# Patient Record
Sex: FEMALE | Race: BLACK OR AFRICAN AMERICAN | NOT HISPANIC OR LATINO | Employment: FULL TIME | URBAN - METROPOLITAN AREA
[De-identification: names, ages, dates, MRNs, and addresses within clinical notes are randomized per-mention and may not be internally consistent; named-entity substitution may affect disease eponyms.]

---

## 2020-12-02 ENCOUNTER — TELEPHONE (OUTPATIENT)
Dept: NEUROLOGY | Facility: CLINIC | Age: 45
End: 2020-12-02

## 2020-12-02 DIAGNOSIS — R20.2 NUMBNESS AND TINGLING OF BOTH UPPER EXTREMITIES: Primary | ICD-10-CM

## 2020-12-02 DIAGNOSIS — R20.0 NUMBNESS AND TINGLING OF BOTH UPPER EXTREMITIES: Primary | ICD-10-CM

## 2020-12-07 ENCOUNTER — TELEPHONE (OUTPATIENT)
Dept: NEUROLOGY | Facility: CLINIC | Age: 45
End: 2020-12-07

## 2020-12-08 ENCOUNTER — PROCEDURE VISIT (OUTPATIENT)
Dept: NEUROLOGY | Facility: CLINIC | Age: 45
End: 2020-12-08
Payer: COMMERCIAL

## 2020-12-08 DIAGNOSIS — R20.2 NUMBNESS AND TINGLING OF BOTH UPPER EXTREMITIES: Primary | ICD-10-CM

## 2020-12-08 DIAGNOSIS — R20.0 NUMBNESS AND TINGLING OF BOTH UPPER EXTREMITIES: Primary | ICD-10-CM

## 2020-12-08 PROCEDURE — 95911 NRV CNDJ TEST 9-10 STUDIES: CPT | Mod: S$GLB,,, | Performed by: PSYCHIATRY & NEUROLOGY

## 2020-12-08 PROCEDURE — 95911 PR NERVE CONDUCTION STUDY; 9-10 STUDIES: ICD-10-PCS | Mod: S$GLB,,, | Performed by: PSYCHIATRY & NEUROLOGY

## 2020-12-08 NOTE — PROCEDURES
[facility]   Angel Ingram  Department of Neurology  [address] [place] [address] [place]  [id number]  Phone [phone]     Fax  [phone]        Patient: Ata Allison YOB: 1975  Patient ID: [id number] Age: 44 Years 11 Months  Sex: Female Ref. Provider: Gm Chaudhry DO  Notes: NCS/LUE/LLE/Peripheral neuropathy workup. Hx: HTN, Borderline diabetes, thrombocytosis.      SUMMARY     Nerve conduction studies were performed in the left upper and lower extremity. The left median motor study recording the abductor pollicis brevis showed a normal amplitude, normal distal latency and normal conduction velocity. The left ulnar motor study recording the abductor digiti minimi showed a normal amplitude, normal distal latency and normal conduction velocity. No conduction block or focal slowing was present across the elbow.     The left median sensory response recording digit two showed a normal amplitude, latency and conduction velocity. The left ulnar sensory response recording digit five showed a normal amplitude, latency and conduction velocity. The left radial sensory response recording over the extensor snuff box showed a normal amplitude, latency and conduction velocity.     The left peroneal motor study recording the extensor digitorum brevis showed a normal amplitude, normal distal latency and normal conduction velocity. No conduction block or focal slowing was present across the fibular neck. The left tibial motor study recording the abductor hallucis brevis showed a normal amplitude, normal distal latency and normal conduction velocity.     Left sural sensory response showed a normal amplitude and conduction velocity. Left superficial peroneal sensory response showed a normal amplitude and conduction velocity.       IMPRESSION     This is a normal study. There is no electrophysiologic evidence of larger fiber peripheral polyneuropathy.     Please note routine nerve conduction  studies assess the larger, myelinated motor and sensory fibers. Thus, routine electrodiagnostic studies may be insensitive in detecting a peripheral neuropathy restricted to small fibers alone (i.e., pain, temperature and autonomic fibers). However, most peripheral neuropathies with predominantly small fiber large dysfunction will also involve large fibers to a lesser extent and will demonstrate abnormalities on electrodiagnostic studies. Thus, clinical correlation is required in the interpretation of this negative electrodiagnostic study if an isolated small fiber neuropathy is considered.      ---------------------------------               Chely Mckeon M.D., F.A.A.N.      Diplomate, American Board of Psychiatry and Neurology  Diplomate, American Board of Clinical Neurophysiology   Fellow, American Academy of Neurology            Sensory NCS      Nerve / Sites Rec. Site Peak NP Amp PP Amp Dist Zurdo d Lat.2     ms µV µV cm m/s ms   L MEDIAN - Dig II      Wrist II 3.18 14.7 17.9 13 50.9 3.18      Ref.  3.40  20.0  48.0    L ULNAR - Dig V      Wrist Dig V 2.60 5.9 12.4 11 55.6 2.60      Ref.  3.10  12.0  48.0    L RADIAL - Snuff box      Forearm Snuff box 2.24 37.0 28.2 10 64.0 2.24      Ref.  2.70 18.0       L MEDIAN - Ulnar - Palmar      Median Wrist 2.19 16.6 7.9 8 42.7 2.19      Ulnar Wrist 1.88 12.6 11.1 8 170.7 -0.31   L SURAL - Lat Mall      Calf Lat Mall 3.54 10.1 5.9 14 52.7 3.54      Ref.  4.50  5.0  40.0    L SUP PERONEAL      Lat Leg Ankle 2.86 9.4 8.5 10 43.6 2.86      Ref.  4.50  5.0  40.0        Motor NCS      Nerve / Sites Rec. Site Lat Amp Dist Zurdo     ms mV cm m/s   L MEDIAN - APB      Wrist APB 3.28 9.4 8       Ref.  3.90 6.0        Elbow APB 6.98 9.1 24 64.9      Ref.     49.0   L ULNAR - ADM      Wrist ADM 2.66 9.6 8       Ref.  3.10 7.0        B.Elbow ADM 6.09 9.3 21.5 62.5      Ref.     50.0      A.Elbow ADM 7.24 9.1 10 87.3   L COMM PERONEAL - EDB      Ankle EDB 2.92 4.2 8       Ref.  5.50 3.0         FibHead EDB 10.10 3.3 34 47.3      Ref.     40.0      Knee EDB 11.82 3.2 11 64.0   L TIBIAL (KNEE) - AH      Ankle AH 3.75 8.2 8       Ref.  6.00 8.0        Knee AH 11.93 4.8 41 50.1      Ref.     40.0

## 2022-09-01 ENCOUNTER — OFFICE VISIT (OUTPATIENT)
Dept: OBSTETRICS AND GYNECOLOGY | Facility: CLINIC | Age: 47
End: 2022-09-01
Payer: COMMERCIAL

## 2022-09-01 VITALS
SYSTOLIC BLOOD PRESSURE: 126 MMHG | WEIGHT: 213.06 LBS | DIASTOLIC BLOOD PRESSURE: 82 MMHG | BODY MASS INDEX: 35.5 KG/M2 | HEIGHT: 65 IN

## 2022-09-01 DIAGNOSIS — Z01.419 ENCOUNTER FOR GYNECOLOGICAL EXAMINATION (GENERAL) (ROUTINE) WITHOUT ABNORMAL FINDINGS: ICD-10-CM

## 2022-09-01 DIAGNOSIS — Z12.4 SCREENING FOR CERVICAL CANCER: ICD-10-CM

## 2022-09-01 DIAGNOSIS — Z30.431 ENCOUNTER FOR ROUTINE CHECKING OF INTRAUTERINE CONTRACEPTIVE DEVICE (IUD): ICD-10-CM

## 2022-09-01 DIAGNOSIS — Z12.11 SPECIAL SCREENING FOR MALIGNANT NEOPLASMS, COLON: ICD-10-CM

## 2022-09-01 DIAGNOSIS — Z12.39 ENCOUNTER FOR SCREENING FOR MALIGNANT NEOPLASM OF BREAST, UNSPECIFIED SCREENING MODALITY: Primary | ICD-10-CM

## 2022-09-01 PROCEDURE — 3074F PR MOST RECENT SYSTOLIC BLOOD PRESSURE < 130 MM HG: ICD-10-PCS | Mod: CPTII,S$GLB,, | Performed by: OBSTETRICS & GYNECOLOGY

## 2022-09-01 PROCEDURE — 88175 CYTOPATH C/V AUTO FLUID REDO: CPT | Performed by: OBSTETRICS & GYNECOLOGY

## 2022-09-01 PROCEDURE — 1159F PR MEDICATION LIST DOCUMENTED IN MEDICAL RECORD: ICD-10-PCS | Mod: CPTII,S$GLB,, | Performed by: OBSTETRICS & GYNECOLOGY

## 2022-09-01 PROCEDURE — 87624 HPV HI-RISK TYP POOLED RSLT: CPT | Performed by: OBSTETRICS & GYNECOLOGY

## 2022-09-01 PROCEDURE — 99386 PR PREVENTIVE VISIT,NEW,40-64: ICD-10-PCS | Mod: S$GLB,,, | Performed by: OBSTETRICS & GYNECOLOGY

## 2022-09-01 PROCEDURE — 99999 PR PBB SHADOW E&M-EST. PATIENT-LVL III: CPT | Mod: PBBFAC,,, | Performed by: OBSTETRICS & GYNECOLOGY

## 2022-09-01 PROCEDURE — 1159F MED LIST DOCD IN RCRD: CPT | Mod: CPTII,S$GLB,, | Performed by: OBSTETRICS & GYNECOLOGY

## 2022-09-01 PROCEDURE — 99999 PR PBB SHADOW E&M-EST. PATIENT-LVL III: ICD-10-PCS | Mod: PBBFAC,,, | Performed by: OBSTETRICS & GYNECOLOGY

## 2022-09-01 PROCEDURE — 3008F BODY MASS INDEX DOCD: CPT | Mod: CPTII,S$GLB,, | Performed by: OBSTETRICS & GYNECOLOGY

## 2022-09-01 PROCEDURE — 3079F PR MOST RECENT DIASTOLIC BLOOD PRESSURE 80-89 MM HG: ICD-10-PCS | Mod: CPTII,S$GLB,, | Performed by: OBSTETRICS & GYNECOLOGY

## 2022-09-01 PROCEDURE — 3008F PR BODY MASS INDEX (BMI) DOCUMENTED: ICD-10-PCS | Mod: CPTII,S$GLB,, | Performed by: OBSTETRICS & GYNECOLOGY

## 2022-09-01 PROCEDURE — 3079F DIAST BP 80-89 MM HG: CPT | Mod: CPTII,S$GLB,, | Performed by: OBSTETRICS & GYNECOLOGY

## 2022-09-01 PROCEDURE — 99386 PREV VISIT NEW AGE 40-64: CPT | Mod: S$GLB,,, | Performed by: OBSTETRICS & GYNECOLOGY

## 2022-09-01 PROCEDURE — 3074F SYST BP LT 130 MM HG: CPT | Mod: CPTII,S$GLB,, | Performed by: OBSTETRICS & GYNECOLOGY

## 2022-09-01 RX ORDER — CARVEDILOL 12.5 MG/1
TABLET ORAL
COMMUNITY
Start: 2022-06-27

## 2022-09-01 RX ORDER — AMLODIPINE BESYLATE 5 MG/1
TABLET ORAL
COMMUNITY
Start: 2022-06-27 | End: 2024-02-26

## 2022-09-01 RX ORDER — FLUCONAZOLE 200 MG/1
200 TABLET ORAL DAILY
Qty: 3 TABLET | Refills: 2 | Status: SHIPPED | OUTPATIENT
Start: 2022-09-01 | End: 2022-09-04

## 2022-09-01 NOTE — PROGRESS NOTES
Subjective:       Patient ID: Estefany Berumen is a 46 y.o. female.    Chief Complaint:  Annual Exam      History of Present Illness  HPI  Annual Exam-Premenopausal  Patient presents for annual exam. The patient has no complaints today. The patient is sexually active. Condoms/mirena iud; 2 partners;--recently  GYN screening history: last pap: was normal and patient does not recall when last pap was and last mammogram: approximate date 2021 and was normal. The patient wears seatbelts: yes. The patient participates in regular exercise: no. Has the patient ever been transfused or tattooed?: no. The patient reports that there is not domestic violence in her life.    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   Date of Last Pap: No result found    OB History    Para Term  AB Living   6 1 1   5 1   SAB IAB Ectopic Multiple Live Births   5       1      # Outcome Date GA Lbr Dev/2nd Weight Sex Delivery Anes PTL Lv   6 SAB            5 SAB            4 SAB            3 SAB            2 SAB            1 Term      Vag-Spont   GERMAN       Review of Systems  Review of Systems   Psychiatric/Behavioral:  Positive for sleep disturbance.    All other systems reviewed and are negative.        Objective:      Physical Exam:   Constitutional: She is oriented to person, place, and time. She appears well-developed and well-nourished.     Eyes: Pupils are equal, round, and reactive to light. Conjunctivae and EOM are normal.      Pulmonary/Chest: Effort normal. Right breast exhibits no mass, no nipple discharge, no skin change and no tenderness. Left breast exhibits no mass, no nipple discharge, no skin change and no tenderness. Breasts are symmetrical.        Abdominal: Soft.      Genitourinary:    Vagina, uterus, right adnexa, left adnexa and rectum normal.      Pelvic exam was performed with patient supine.   The external female genitalia was normal.   Labial bartholins normal.Cervix is normal. Right adnexum displays no mass and no tenderness. Left adnexum displays no mass and no tenderness. No erythema,  no vaginal discharge, bleeding, rectocele, cystocele or unspecified prolapse of vaginal walls in the vagina. Vagina was moist.   pap smear completedUerus contour normal  Normal urethral meatus.Urethra findings: no urethral massBladder findings: no bladder distention and no bladder tendernessUnable to visualize IUD strings.          Musculoskeletal: Normal range of motion and moves all extremeties.       Neurological: She is alert and oriented to person, place, and time.    Skin: Skin is warm.    Psychiatric: She has a normal mood and affect. Her behavior is normal.         Assessment:        1. Encounter for screening for malignant neoplasm of breast, unspecified screening modality    2. Encounter for gynecological examination (general) (routine) without abnormal findings    3. Encounter for routine checking of intrauterine contraceptive device (IUD)    4. Special screening for malignant neoplasms, colon               Plan:      Continue annual well woman exam.  Pap today; Reviewed updated recommendations for pap smears (every 3 years) in low risk patients.   Recommend annual pelvic exams.  Reviewed recommendations for annual CBE.  Mirena due for replacement 5/2025  Accepts colonoscopy for colon cancer screen  Continue menstrual calendar  Continue diet, exercise, weight loss    Requesting diflucan, rx sent

## 2022-10-07 ENCOUNTER — HOSPITAL ENCOUNTER (OUTPATIENT)
Dept: RADIOLOGY | Facility: HOSPITAL | Age: 47
Discharge: HOME | End: 2022-10-07
Attending: OBSTETRICS & GYNECOLOGY
Payer: COMMERCIAL

## 2022-10-07 DIAGNOSIS — Z12.39 ENCOUNTER FOR SCREENING FOR MALIGNANT NEOPLASM OF BREAST, UNSPECIFIED SCREENING MODALITY: ICD-10-CM

## 2022-10-07 PROCEDURE — 77067 SCR MAMMO BI INCL CAD: CPT | Mod: TC

## 2022-10-07 PROCEDURE — 77063 BREAST TOMOSYNTHESIS BI: CPT | Mod: TC

## 2022-10-07 PROCEDURE — 77067 SCR MAMMO BI INCL CAD: CPT | Mod: 26,,, | Performed by: RADIOLOGY

## 2022-10-07 PROCEDURE — 77063 MAMMO DIGITAL SCREENING BILAT WITH TOMO: ICD-10-PCS | Mod: 26,,, | Performed by: RADIOLOGY

## 2022-10-07 PROCEDURE — 77063 BREAST TOMOSYNTHESIS BI: CPT | Mod: 26,,, | Performed by: RADIOLOGY

## 2022-10-07 PROCEDURE — 77067 MAMMO DIGITAL SCREENING BILAT WITH TOMO: ICD-10-PCS | Mod: 26,,, | Performed by: RADIOLOGY

## 2022-10-11 ENCOUNTER — TELEPHONE (OUTPATIENT)
Dept: OBSTETRICS AND GYNECOLOGY | Facility: CLINIC | Age: 47
End: 2022-10-11
Payer: COMMERCIAL

## 2022-10-11 NOTE — TELEPHONE ENCOUNTER
----- Message from Tigist Mejia MD sent at 10/11/2022  2:58 PM CDT -----  Please send negative mammo letter

## 2022-10-11 NOTE — TELEPHONE ENCOUNTER
Attempted to contact patient, no answer. Left patient voice mail to return call to clinic.    Regarding test results.

## 2022-10-11 NOTE — TELEPHONE ENCOUNTER
Pt returned phone call to clinic. Spoke with patient. Two pt identifiers confirmed. Notified patient of results. Patients verbalized understanding.

## 2023-08-03 DIAGNOSIS — Z12.39 BREAST CANCER SCREENING OTHER THAN MAMMOGRAM: Primary | ICD-10-CM

## 2023-09-21 ENCOUNTER — OFFICE VISIT (OUTPATIENT)
Dept: OBSTETRICS AND GYNECOLOGY | Facility: CLINIC | Age: 48
End: 2023-09-21
Payer: COMMERCIAL

## 2023-09-21 VITALS
WEIGHT: 212.31 LBS | BODY MASS INDEX: 35.37 KG/M2 | SYSTOLIC BLOOD PRESSURE: 112 MMHG | HEIGHT: 65 IN | DIASTOLIC BLOOD PRESSURE: 70 MMHG

## 2023-09-21 DIAGNOSIS — N39.46 MIXED STRESS AND URGE URINARY INCONTINENCE: ICD-10-CM

## 2023-09-21 DIAGNOSIS — R35.0 INCREASED URINARY FREQUENCY: ICD-10-CM

## 2023-09-21 DIAGNOSIS — Z01.419 ENCOUNTER FOR GYNECOLOGICAL EXAMINATION (GENERAL) (ROUTINE) WITHOUT ABNORMAL FINDINGS: Primary | ICD-10-CM

## 2023-09-21 DIAGNOSIS — N32.81 URGENCY-FREQUENCY SYNDROME: ICD-10-CM

## 2023-09-21 DIAGNOSIS — Z12.31 SCREENING MAMMOGRAM, ENCOUNTER FOR: ICD-10-CM

## 2023-09-21 DIAGNOSIS — Z12.4 SCREENING FOR CERVICAL CANCER: ICD-10-CM

## 2023-09-21 PROCEDURE — 3008F PR BODY MASS INDEX (BMI) DOCUMENTED: ICD-10-PCS | Mod: CPTII,S$GLB,, | Performed by: OBSTETRICS & GYNECOLOGY

## 2023-09-21 PROCEDURE — 1159F PR MEDICATION LIST DOCUMENTED IN MEDICAL RECORD: ICD-10-PCS | Mod: CPTII,S$GLB,, | Performed by: OBSTETRICS & GYNECOLOGY

## 2023-09-21 PROCEDURE — 3008F BODY MASS INDEX DOCD: CPT | Mod: CPTII,S$GLB,, | Performed by: OBSTETRICS & GYNECOLOGY

## 2023-09-21 PROCEDURE — 99396 PREV VISIT EST AGE 40-64: CPT | Mod: S$GLB,,, | Performed by: OBSTETRICS & GYNECOLOGY

## 2023-09-21 PROCEDURE — 3074F SYST BP LT 130 MM HG: CPT | Mod: CPTII,S$GLB,, | Performed by: OBSTETRICS & GYNECOLOGY

## 2023-09-21 PROCEDURE — 99396 PR PREVENTIVE VISIT,EST,40-64: ICD-10-PCS | Mod: S$GLB,,, | Performed by: OBSTETRICS & GYNECOLOGY

## 2023-09-21 PROCEDURE — 99999 PR PBB SHADOW E&M-EST. PATIENT-LVL III: ICD-10-PCS | Mod: PBBFAC,,, | Performed by: OBSTETRICS & GYNECOLOGY

## 2023-09-21 PROCEDURE — 87086 URINE CULTURE/COLONY COUNT: CPT | Performed by: OBSTETRICS & GYNECOLOGY

## 2023-09-21 PROCEDURE — 3078F PR MOST RECENT DIASTOLIC BLOOD PRESSURE < 80 MM HG: ICD-10-PCS | Mod: CPTII,S$GLB,, | Performed by: OBSTETRICS & GYNECOLOGY

## 2023-09-21 PROCEDURE — 3074F PR MOST RECENT SYSTOLIC BLOOD PRESSURE < 130 MM HG: ICD-10-PCS | Mod: CPTII,S$GLB,, | Performed by: OBSTETRICS & GYNECOLOGY

## 2023-09-21 PROCEDURE — 1159F MED LIST DOCD IN RCRD: CPT | Mod: CPTII,S$GLB,, | Performed by: OBSTETRICS & GYNECOLOGY

## 2023-09-21 PROCEDURE — 99999 PR PBB SHADOW E&M-EST. PATIENT-LVL III: CPT | Mod: PBBFAC,,, | Performed by: OBSTETRICS & GYNECOLOGY

## 2023-09-21 PROCEDURE — 3078F DIAST BP <80 MM HG: CPT | Mod: CPTII,S$GLB,, | Performed by: OBSTETRICS & GYNECOLOGY

## 2023-09-21 RX ORDER — MIRABEGRON 25 MG/1
25 TABLET, FILM COATED, EXTENDED RELEASE ORAL DAILY
Qty: 90 TABLET | Refills: 3 | Status: SHIPPED | OUTPATIENT
Start: 2023-09-21 | End: 2023-11-10 | Stop reason: ALTCHOICE

## 2023-09-21 NOTE — PROGRESS NOTES
Subjective:       Patient ID: Estefany Berumen is a 47 y.o. female.    Chief Complaint:  Well Woman      History of Present Illness  HPI  Annual Exam-Premenopausal  Patient presents for annual exam. The patient reports increased pressure--like something falling out; also c/o urinary incontinence for past years--occas use of oxybutynin---urgency/frequency/ sometimes urine just comes--when she is sitting;  can leak with a strong urge; also leaks with cough/sneeze; feels she does not empty bladder completely;  The patient is sexually active. --deneis pelvic pain, iud for contraception; GYN screening history: last pap: approximate date 2022 and was normal and last mammogram: approximate date  and was normal. The patient wears seatbelts: yes. The patient participates in regular exercise: no. Has the patient ever been transfused or tattooed?: no. The patient reports that there is not domestic violence in her life.  Problems falling/staying asleep    Rare bleeding; has iud in placed ( 2020);   GYN & OB History  No LMP recorded. (Menstrual status: Birth Control).   Date of Last Pap: No result found    OB History    Para Term  AB Living   6 1 1   5 1   SAB IAB Ectopic Multiple Live Births   5       1      # Outcome Date GA Lbr Dev/2nd Weight Sex Delivery Anes PTL Lv   6 SAB            5 SAB            4 SAB            3 SAB            2 SAB            1 Term      Vag-Spont   GERMAN       Review of Systems  Review of Systems   Genitourinary:  Positive for frequency, urgency and urinary incontinence.   All other systems reviewed and are negative.          Objective:      Physical Exam:   Constitutional: She is oriented to person, place, and time. She appears well-developed and well-nourished.     Eyes: Pupils are equal, round, and reactive to light. Conjunctivae and EOM are normal.      Pulmonary/Chest: Effort normal. Right breast exhibits no mass, no nipple discharge, no skin change and no tenderness. Left  breast exhibits no mass, no nipple discharge, no skin change and no tenderness. Breasts are symmetrical.        Abdominal: Soft.     Genitourinary:    Vagina, uterus, right adnexa, left adnexa and rectum normal.      Pelvic exam was performed with patient supine.   The external female genitalia was normal.   Labial bartholins normal.Cervix is normal. Right adnexum displays no mass and no tenderness. Left adnexum displays no mass and no tenderness. There is rectocele in the vagina. No erythema,  no vaginal discharge, bleeding, cystocele or unspecified prolapse of vaginal walls in the vagina. Vagina was moist.   pap smear not completedUerus contour normal  Normal urethral meatus.Urethra findings: no urethral massBladder findings: no bladder distention and no bladder tenderness          Musculoskeletal: Normal range of motion and moves all extremeties.       Neurological: She is alert and oriented to person, place, and time.    Skin: Skin is warm.    Psychiatric: She has a normal mood and affect. Her behavior is normal.           Assessment:     Encounter Diagnoses   Name Primary?    Increased urinary frequency     Urgency-frequency syndrome     Mixed stress and urge urinary incontinence     Encounter for gynecological examination (general) (routine) without abnormal findings Yes    Screening for cervical cancer     Screening mammogram, encounter for          Plan:      Continue annual well woman exam.  Pap 2022 due in 2025; Reviewed updated recommendations for pap smears (every 3 years) in low risk patients.   Recommend annual pelvic exams.  Reviewed recommendations for annual CBE.  Daily stool softner  Rx sent for myrbetriq  Ucx today  Iud due to be removed in 2025  Has colonoscopy scheduled  mammo ordered, continue yearly until age 74   Encouraged diet, exercise, weight loss

## 2023-09-23 LAB — BACTERIA UR CULT: NO GROWTH

## 2023-09-25 ENCOUNTER — TELEPHONE (OUTPATIENT)
Dept: OBSTETRICS AND GYNECOLOGY | Facility: CLINIC | Age: 48
End: 2023-09-25
Payer: COMMERCIAL

## 2023-09-25 NOTE — TELEPHONE ENCOUNTER
----- Message from Marlene aGrcia sent at 9/25/2023  3:04 PM CDT -----  Regarding: HAD QUESTION ABOUT HER LAST VISIT  Mrs nathan lee was seen by dr dougherty on 9-21-23 , she had some question to ask about her visit , she can be re back at [phone]

## 2023-09-26 ENCOUNTER — TELEPHONE (OUTPATIENT)
Dept: OBSTETRICS AND GYNECOLOGY | Facility: CLINIC | Age: 48
End: 2023-09-26
Payer: COMMERCIAL

## 2023-09-26 DIAGNOSIS — N32.81 URGENCY-FREQUENCY SYNDROME: Primary | ICD-10-CM

## 2023-09-26 DIAGNOSIS — B37.31 YEAST VAGINITIS: ICD-10-CM

## 2023-09-26 DIAGNOSIS — N39.46 MIXED STRESS AND URGE URINARY INCONTINENCE: ICD-10-CM

## 2023-09-26 RX ORDER — FLUCONAZOLE 200 MG/1
200 TABLET ORAL DAILY
Qty: 3 TABLET | Refills: 0 | Status: SHIPPED | OUTPATIENT
Start: 2023-09-26 | End: 2023-09-29

## 2023-09-26 NOTE — TELEPHONE ENCOUNTER
----- Message from Mamie Funez sent at 9/25/2023  4:11 PM CDT -----  Contact: Murali [phone]--  Patient is returning a phone call.    Who left a message for the patient: --Nurse--    Does patient know what this is regarding:  --questions--    Would you like a call back, or a response through your [facility] portal?:  --Call back--    Comments:  Please call to advise.

## 2023-09-26 NOTE — TELEPHONE ENCOUNTER
Called patient and she stated Dr. Mejia mentioned her seeing urology and doing pelvic floor PT.  Patient would like referrals for both of those.  Patient has urologist, but may prefer [facility].    Patient also requesting Diflucan (patient stated she has yeast).      Advised patient message would be sent to Dr. Mejia and she verbalized understanding.

## 2023-10-16 NOTE — PROGRESS NOTES
[facility]   Pelvic Health Physical Therapy Initial Evaluation     Date: 10/17/2023   Name: Estefany Berumen  Clinic Number: [id number]    Therapy Diagnosis:   Encounter Diagnoses   Name Primary?    Urgency-frequency syndrome     Mixed stress and urge urinary incontinence     Pelvic floor dysfunction      Physician: Tigist Mejia MD    Physician Orders: PT Eval and Treat   Medical Diagnosis from Referral: Urgency-frequency syndrome [N32.81], Mixed stress and urge urinary incontinence [N39.46]  Evaluation Date: 10/17/2023  Authorization Period Expiration: 2024  Plan of Care Expiration: 2023  Progress Note Due: 2023  Visit # / Visits authorized:  eval  FOTO: Issued Visit #: 1 /3    Precautions: Standard    Time In: 9:20  Time Out: 10:35  Total Appointment Time (timed & untimed codes): 75 minutes    SUBJECTIVE     Date of onset: months     History of current condition - Estefany reports: having rectocele that has been noticable for months. She notices it when she is walking for an extended time and when going to the bathroom. She will push on vaginal opening at times to decrease pressure/heaviness feeling. Patient stating MD would like to perform surgery instead of pessary use.     OB/GYN History: , vaginal delivery, episiotomy, rectocele prolapse, painful vaginal penetration, and chronic yeast infections   Sexually active? No  Pain with vaginal exams, intercourse or tampon use? Yes    Bladder/Bowel History: trouble initiating urine stream, slow stream, trouble emptying bladder completely, recurrent bladder infections, urinary incontinence, and constipation/straining for movement  Frequency of urination:   Daytime: every 2-3 hours. Reports having overactive bladder (mybetriq)           Nighttime: 3  Difficulty initiating urine stream: Yes sometimes; has gotten better since   Urine stream: weak and strong  Complete emptying: No  Bladder leakage: Yes  Frequency of  incidents: 3-4 times a day (doesn't matter the activity) has been going on for awhile  Amount leaked (urine): small squirt   Urinary Urgency: Yes, Able to delay the urge for at least 15 minute(s).  Frequency of bowel movements: every 3-5 days   Difficulty initiating BM: Yes  Quality/Shape of BM: [place] Stool Chart Type 1-3   Does Patient Feel Empty after BM? Yes  Fiber Supplements or Laxative Use? Yes (suppositories and new medication)   Colon leakage: No  Frequency of incidents: none   Amount leaked (bowels):  none  Form of protection: panty liner  Number of pads required in 24 hours: 3-4     Pain:  Location: vaginal exam  worst 6/10  Description: discomfort    Imaging: see chart    Prior Therapy: in the past   Social History:  lives alone  Current exercise: stopped due to heaviness.   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     Surgical History: Estefany Berumen  has a past surgical history that includes Reduction of both breasts (Bilateral, 2015).    Medications: Estefany has a current medication list which includes the following prescription(s): amlodipine, carvedilol, fesoterodine, myrbetriq, and olmesartan-hydrochlorothiazide.    Allergies:   Review of patient's allergies indicates:   Allergen Reactions    Sulfa (sulfonamide antibiotics) Other (See Comments)    Sulfamethoxazole-trimethoprim     Trimethoprim Other (See Comments)        OBJECTIVE     See EMR under MEDIA for written consent provided 10/17/2023    ORTHO SCREEN  Posture in sitting: slouched   Pelvic alignment: no sign of deviations noted in supine     ABDOMINAL WALL  ASSESSMENT  Palpation: hypertonic  Abdominal strength: Transverse abdominus: fair with exhale cueing   Scarring: none noted   Diastasis: absent      BREATHING MECHANICS ASSESSMENT   Thorax Assessment During Quiet Respiration: Decreased excursion of abdominal wall  and Decreased excursion bilaterally of lateral ribs   Thorax Assessment During Deep Respiration: Decreased excursion of abdominal wall  and Excessive excursion of lower ribs. Slight improvement with cueing    VAGINAL PELVIC FLOOR EXAM    EXTERNAL ASSESSMENT  Introitus: gaping  Skin condition: WNL  Scarring: episiotomy scar noted   Sensation: WNL   Pain: none  Voluntary contraction: visible lift  Voluntary relaxation: nil  Involuntary contraction: reflex tightening  Bearing down: bulge      INTERNAL ASSESSMENT  Pain: tender areas noted as follows: bilateral obturators, bilateral superficial transverse perineal    Sensation: able to localized pressure appropriately  Vaginal vault: stenotic   Muscle Bulk: hypertonus   Muscle Power: 3+/5  Muscle Endurance: 2 sec    Quality of contraction: incomplete relaxation   Coordination: tends to hold breath during PFM contration   Prolapse check: anterior and posterior vaginal wall weakness  Comments: increased tension in deep pelvic floor muscles with diaphragmatic inhale    Limitation/Restriction for FOTO Pelvic Floor Survey    Therapist reviewed FOTO scores for Estefany Berumen on 10/17/2023.   FOTO documents entered into [employer] - see Media section.    Limitation Score: PFDI Prolapse 21     TREATMENT     Total Treatment time (time-based codes) separate from Evaluation: 33 minutes       Manual Therapy to improve flexibility and extensibility for 08 minutes including:     Internal trigger point release to bilateral obturators internus and bilateral superficial transverse perineal- patient [place]wan pelvic floor muscles and increasing tension with diaphragmatic breathing    Therapeutic Activity to improve dynamic  "functional performance for 25 minutes including:    Reviewed HEP   Diaphragmatic breathing with tactile and verbal cues  TA contractions with deep breathing and "shhh" on exhale  Cat cows x10 - cues for deep breathing techniques  Education on double voiding techniques. Handout given and demonstrated.  Education on urge control. Handout given.       PATIENT EDUCATION AND HOME EXERCISES     Education provided:   general anatomy/physiology of urinary/ bowel  system, benefits of treatment, risks of treatment, and alternative methods of treatment were discussed with the patient. Additionally, anatomy/physiology of pelvic floor, bladder retraining, diaphragmatic breathing, double voiding techniques, isometric abdominal exercises, kegels, proper bearing down techniques, and fluid intake/dietary modifications were reviewed.     Written Home Exercises provided: yes.  Exercises were reviewed and Estefany was able to demonstrate them prior to the end of the session. Estefany demonstrated good  understanding of the education provided. See EMR under Patient Instructions for exercises provided during therapy sessions.    ASSESSMENT     Estefany is a 47 y.o. female referred to outpatient Physical Therapy with a medical diagnosis of Urgency-frequency syndrome and Mixed stress and urge urinary incontinence. Pt presents with altered posture, poor knowledge of body mechanics and posture, poor trunk stability, pelvic floor tenderness, decreased pelvic muscle strength, decreased endurance of the pelvic muscles, increased tension of the pelvic muscles, poor quality of pelvic muscle contraction, increased nocturia, poor fluid intake, incomplete urination, and dysfunctional voiding.        Patient prognosis is Good.   Patient will benefit from skilled outpatient Physical Therapy to address the deficits stated above and in the chart below, provide patient/family education, and to maximize patient's level of independence.     Plan of care " discussed with patient: Yes  Patient's spiritual, cultural and educational needs considered and patient is agreeable to the plan of care and goals as stated below:     Anticipated Barriers for therapy: none    Medical Necessity is demonstrated by the following:    History  Co-morbidities and personal factors that may impact the plan of care [x] LOW: no personal factors / co-morbidities  [] MODERATE: 1-2 personal factors / co-morbidities  [] HIGH: 3+ personal factors / co-morbidities    Personal Factors:   no deficits     Examination  Body Structures and Functions, activity limitations and participation restrictions that may impact the plan of care [] LOW: addressing 1-2 elements  [x] MODERATE: 3+ elements  [] HIGH: 4+ elements (please support below)    Moderate / High Support Documentation: altered posture, poor knowledge of body mechanics and posture, poor trunk stability, pelvic floor tenderness, decreased pelvic muscle strength, decreased endurance of the pelvic muscles, increased tension of the pelvic muscles, poor quality of pelvic muscle contraction, increased nocturia, poor fluid intake, incomplete urination, and dysfunctional voiding     Clinical Presentation [x] LOW: stable  [] MODERATE: Evolving  [] HIGH: Unstable     Decision Making/ Complexity Score: low        Goals:  Short Term Goals: 5 weeks   - Pt will demonstrate excellent knowledge and adherence to HEP to facilitate optimal recovery.  - Pt will demonstrate proper PFM contraction, relaxation, and lengthening coordinated with TA and breath for improved muscle coordination needed for functional activity.  - Pt to show proper diaphragmatic breathing to promote relaxation and pelvic floor functional mobility and to help with calming the nervous system in order to decrease pain and improve QOL.  - Pt will report improved ability to initiate urine stream and completely empty without straining to demonstrate improved PF relaxation for proper voiding.  - Pt  to report a decrease in pelvic pressure and fullness to no more than 50% of the time to demonstrate improving pelvic support of pelvic structures.  - Pt will report no incidence of urinary incontinence 4/7 days for improved hygiene and ADL/IADL tolerance.     Long Term Goals: 10 weeks   - Pt will demonstrate excellent knowledge and adherence to HEP for continued self-maintenance of symptoms.  - Pt will report FOTO score on PFDI Prolapse of 10 limited or less indicating clinically relevant increase in function.  - Pt to demonstrate proper pressure management with all functional mobility including blowing out with exertion and activating pelvic floor + transverse abdominus to demonstrate improving pelvic organ support for improved ADL participation.  - Pt to report a decrease in pelvic pressure and fullness to no more than 25% of the time to demonstrate improving pelvic support of pelvic structures.  - Pt will report ability to delay urinary urge for at least 25 minutes to maintain continence with ADL/IADLs.   - Pt will report no incidence of urinary incontinence 6/7 days for improved hygiene and ADL/IADL tolerance.   - Pt will report needing </= 2 pad/day indicating improved PFM function needed to maintain continence  - Pt will demonstrate PFM strength of at least 3/5 MMT for improved strength needed to maintain continence.   - Pt will report bearing down appropriately 100% of the time for improved bowel function and decreased stress on adjacent pelvic structures.   - Pt will report ability to tolerate speculum exam without pain for improved access to healthcare.    PLAN     Plan of care Certification: 10/17/2023 to 12/26/2023.    Outpatient Physical Therapy 1-2 time(s) every 1 week(s) for 10 weeks to include the following interventions: Electrical Stimulation TENS/IFC, Manual Therapy, Moist Heat/ Ice, Neuromuscular Re-ed, Patient Education, Self Care, Therapeutic Activities, Therapeutic Exercise, and FDN and ASTYM .      Cata Dominique PT      I CERTIFY THE NEED FOR THESE SERVICES FURNISHED UNDER THIS PLAN OF TREATMENT AND WHILE UNDER MY CARE   Physician's comments:     Physician's Signature: ___________________________________________________

## 2023-10-17 ENCOUNTER — CLINICAL SUPPORT (OUTPATIENT)
Dept: REHABILITATION | Facility: HOSPITAL | Age: 48
End: 2023-10-17
Attending: OBSTETRICS & GYNECOLOGY
Payer: COMMERCIAL

## 2023-10-17 DIAGNOSIS — N39.46 MIXED STRESS AND URGE URINARY INCONTINENCE: ICD-10-CM

## 2023-10-17 DIAGNOSIS — M62.89 PELVIC FLOOR DYSFUNCTION: ICD-10-CM

## 2023-10-17 DIAGNOSIS — N32.81 URGENCY-FREQUENCY SYNDROME: ICD-10-CM

## 2023-10-17 PROCEDURE — 97530 THERAPEUTIC ACTIVITIES: CPT | Mod: PN

## 2023-10-17 PROCEDURE — 97161 PT EVAL LOW COMPLEX 20 MIN: CPT | Mod: PN

## 2023-10-17 PROCEDURE — 97140 MANUAL THERAPY 1/> REGIONS: CPT | Mod: PN

## 2023-10-17 NOTE — PLAN OF CARE
[facility]   Pelvic Health Physical Therapy Initial Evaluation     Date: 10/17/2023   Name: Estefany Berumen  Clinic Number: [id number]    Therapy Diagnosis:   Encounter Diagnoses   Name Primary?    Urgency-frequency syndrome     Mixed stress and urge urinary incontinence     Pelvic floor dysfunction      Physician: Tigist Mejia MD    Physician Orders: PT Eval and Treat   Medical Diagnosis from Referral: Urgency-frequency syndrome [N32.81], Mixed stress and urge urinary incontinence [N39.46]  Evaluation Date: 10/17/2023  Authorization Period Expiration: 2024  Plan of Care Expiration: 2023  Progress Note Due: 2023  Visit # / Visits authorized:  eval  FOTO: Issued Visit #: 1 /3    Precautions: Standard    Time In: 9:20  Time Out: 10:35  Total Appointment Time (timed & untimed codes): 75 minutes    SUBJECTIVE     Date of onset: months     History of current condition - Estefany reports: having rectocele that has been noticable for months. She notices it when she is walking for an extended time and when going to the bathroom. She will push on vaginal opening at times to decrease pressure/heaviness feeling. Patient stating MD would like to perform surgery instead of pessary use.     OB/GYN History: , vaginal delivery, episiotomy, rectocele prolapse, painful vaginal penetration, and chronic yeast infections   Sexually active? No  Pain with vaginal exams, intercourse or tampon use? Yes    Bladder/Bowel History: trouble initiating urine stream, slow stream, trouble emptying bladder completely, recurrent bladder infections, urinary incontinence, and constipation/straining for movement  Frequency of urination:   Daytime: every 2-3 hours. Reports having overactive bladder (mybetriq)           Nighttime: 3  Difficulty initiating urine stream: Yes sometimes; has gotten better since   Urine stream: weak and strong  Complete emptying: No  Bladder leakage: Yes  Frequency of  incidents: 3-4 times a day (doesn't matter the activity) has been going on for awhile  Amount leaked (urine): small squirt   Urinary Urgency: Yes, Able to delay the urge for at least 15 minute(s).  Frequency of bowel movements: every 3-5 days   Difficulty initiating BM: Yes  Quality/Shape of BM: [place] Stool Chart Type 1-3   Does Patient Feel Empty after BM? Yes  Fiber Supplements or Laxative Use? Yes (suppositories and new medication)   Colon leakage: No  Frequency of incidents: none   Amount leaked (bowels): none  Form of protection: panty liner  Number of pads required in 24 hours: 3-4     Pain:  Location: vaginal exam  worst 6/10  Description: discomfort    Imaging: see chart    Prior Therapy: in the past   Social History:  lives alone  Current exercise: stopped due to heaviness.   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     Surgical History: Estefany Berumen  has a past surgical history that includes Reduction of both breasts (Bilateral, 2015).    Medications: Estefany has a current medication list which includes the following prescription(s): amlodipine, carvedilol, fesoterodine, myrbetriq, and olmesartan-hydrochlorothiazide.    Allergies:   Review of patient's allergies indicates:   Allergen Reactions    Sulfa (sulfonamide antibiotics) Other (See Comments)    Sulfamethoxazole-trimethoprim     Trimethoprim Other (See Comments)        OBJECTIVE     See EMR under MEDIA for written consent provided 10/17/2023    ORTHO SCREEN  Posture in sitting: slouched   Pelvic alignment: no sign of deviations noted in supine     ABDOMINAL WALL  ASSESSMENT  Palpation: hypertonic  Abdominal strength: Transverse abdominus: fair with exhale cueing   Scarring: none noted   Diastasis: absent      BREATHING MECHANICS ASSESSMENT   Thorax Assessment During Quiet Respiration: Decreased excursion of abdominal wall  and Decreased excursion bilaterally of lateral ribs   Thorax Assessment During Deep Respiration: Decreased excursion of abdominal wall  and Excessive excursion of lower ribs. Slight improvement with cueing    VAGINAL PELVIC FLOOR EXAM    EXTERNAL ASSESSMENT  Introitus: gaping  Skin condition: WNL  Scarring: episiotomy scar noted   Sensation: WNL   Pain: none  Voluntary contraction: visible lift  Voluntary relaxation: nil  Involuntary contraction: reflex tightening  Bearing down: bulge      INTERNAL ASSESSMENT  Pain: tender areas noted as follows: bilateral obturators, bilateral superficial transverse perineal    Sensation: able to localized pressure appropriately  Vaginal vault: stenotic   Muscle Bulk: hypertonus   Muscle Power: 3+/5  Muscle Endurance: 2 sec    Quality of contraction: incomplete relaxation   Coordination: tends to hold breath during PFM contration   Prolapse check: anterior and posterior vaginal wall weakness  Comments: increased tension in deep pelvic floor muscles with diaphragmatic inhale    Limitation/Restriction for FOTO Pelvic Floor Survey    Therapist reviewed FOTO scores for Estefany Berumen on 10/17/2023.   FOTO documents entered into [employer] - see Media section.    Limitation Score: PFDI Prolapse 21     TREATMENT     Total Treatment time (time-based codes) separate from Evaluation: 33 minutes       Manual Therapy to improve flexibility and extensibility for 08 minutes including:     Internal trigger point release to bilateral obturators internus and bilateral superficial transverse perineal- patient [place] pelvic floor muscles and increasing tension with diaphragmatic breathing    Therapeutic Activity to improve dynamic  "functional performance for 25 minutes including:    Reviewed HEP   Diaphragmatic breathing with tactile and verbal cues  TA contractions with deep breathing and "shhh" on exhale  Cat cows x10 - cues for deep breathing techniques  Education on double voiding techniques. Handout given and demonstrated.  Education on urge control. Handout given.       PATIENT EDUCATION AND HOME EXERCISES     Education provided:   general anatomy/physiology of urinary/ bowel  system, benefits of treatment, risks of treatment, and alternative methods of treatment were discussed with the patient. Additionally, anatomy/physiology of pelvic floor, bladder retraining, diaphragmatic breathing, double voiding techniques, isometric abdominal exercises, kegels, proper bearing down techniques, and fluid intake/dietary modifications were reviewed.     Written Home Exercises provided: yes.  Exercises were reviewed and Estefany was able to demonstrate them prior to the end of the session. Estefany demonstrated good  understanding of the education provided. See EMR under Patient Instructions for exercises provided during therapy sessions.    ASSESSMENT     Estefany is a 47 y.o. female referred to outpatient Physical Therapy with a medical diagnosis of Urgency-frequency syndrome and Mixed stress and urge urinary incontinence. Pt presents with altered posture, poor knowledge of body mechanics and posture, poor trunk stability, pelvic floor tenderness, decreased pelvic muscle strength, decreased endurance of the pelvic muscles, increased tension of the pelvic muscles, poor quality of pelvic muscle contraction, increased nocturia, poor fluid intake, incomplete urination, and dysfunctional voiding.        Patient prognosis is Good.   Patient will benefit from skilled outpatient Physical Therapy to address the deficits stated above and in the chart below, provide patient/family education, and to maximize patient's level of independence.     Plan of care " discussed with patient: Yes  Patient's spiritual, cultural and educational needs considered and patient is agreeable to the plan of care and goals as stated below:     Anticipated Barriers for therapy: none    Medical Necessity is demonstrated by the following:    History  Co-morbidities and personal factors that may impact the plan of care [x] LOW: no personal factors / co-morbidities  [] MODERATE: 1-2 personal factors / co-morbidities  [] HIGH: 3+ personal factors / co-morbidities    Personal Factors:   no deficits     Examination  Body Structures and Functions, activity limitations and participation restrictions that may impact the plan of care [] LOW: addressing 1-2 elements  [x] MODERATE: 3+ elements  [] HIGH: 4+ elements (please support below)    Moderate / High Support Documentation: altered posture, poor knowledge of body mechanics and posture, poor trunk stability, pelvic floor tenderness, decreased pelvic muscle strength, decreased endurance of the pelvic muscles, increased tension of the pelvic muscles, poor quality of pelvic muscle contraction, increased nocturia, poor fluid intake, incomplete urination, and dysfunctional voiding     Clinical Presentation [x] LOW: stable  [] MODERATE: Evolving  [] HIGH: Unstable     Decision Making/ Complexity Score: low        Goals:  Short Term Goals: 5 weeks   - Pt will demonstrate excellent knowledge and adherence to HEP to facilitate optimal recovery.  - Pt will demonstrate proper PFM contraction, relaxation, and lengthening coordinated with TA and breath for improved muscle coordination needed for functional activity.  - Pt to show proper diaphragmatic breathing to promote relaxation and pelvic floor functional mobility and to help with calming the nervous system in order to decrease pain and improve QOL.  - Pt will report improved ability to initiate urine stream and completely empty without straining to demonstrate improved PF relaxation for proper voiding.  - Pt  to report a decrease in pelvic pressure and fullness to no more than 50% of the time to demonstrate improving pelvic support of pelvic structures.  - Pt will report no incidence of urinary incontinence 4/7 days for improved hygiene and ADL/IADL tolerance.     Long Term Goals: 10 weeks   - Pt will demonstrate excellent knowledge and adherence to HEP for continued self-maintenance of symptoms.  - Pt will report FOTO score on PFDI Prolapse of 10 limited or less indicating clinically relevant increase in function.  - Pt to demonstrate proper pressure management with all functional mobility including blowing out with exertion and activating pelvic floor + transverse abdominus to demonstrate improving pelvic organ support for improved ADL participation.  - Pt to report a decrease in pelvic pressure and fullness to no more than 25% of the time to demonstrate improving pelvic support of pelvic structures.  - Pt will report ability to delay urinary urge for at least 25 minutes to maintain continence with ADL/IADLs.   - Pt will report no incidence of urinary incontinence 6/7 days for improved hygiene and ADL/IADL tolerance.   - Pt will report needing </= 2 pad/day indicating improved PFM function needed to maintain continence  - Pt will demonstrate PFM strength of at least 3/5 MMT for improved strength needed to maintain continence.   - Pt will report bearing down appropriately 100% of the time for improved bowel function and decreased stress on adjacent pelvic structures.   - Pt will report ability to tolerate speculum exam without pain for improved access to healthcare.    PLAN     Plan of care Certification: 10/17/2023 to 12/26/2023.    Outpatient Physical Therapy 1-2 time(s) every 1 week(s) for 10 weeks to include the following interventions: Electrical Stimulation TENS/IFC, Manual Therapy, Moist Heat/ Ice, Neuromuscular Re-ed, Patient Education, Self Care, Therapeutic Activities, Therapeutic Exercise, and FDN and ASTYM.      Cata Dominique PT      I CERTIFY THE NEED FOR THESE SERVICES FURNISHED UNDER THIS PLAN OF TREATMENT AND WHILE UNDER MY CARE   Physician's comments:     Physician's Signature: ___________________________________________________

## 2023-10-24 ENCOUNTER — HOSPITAL ENCOUNTER (OUTPATIENT)
Dept: RADIOLOGY | Facility: HOSPITAL | Age: 48
Discharge: HOME | End: 2023-10-24
Attending: OBSTETRICS & GYNECOLOGY
Payer: COMMERCIAL

## 2023-10-24 ENCOUNTER — CLINICAL SUPPORT (OUTPATIENT)
Dept: REHABILITATION | Facility: HOSPITAL | Age: 48
End: 2023-10-24
Payer: COMMERCIAL

## 2023-10-24 DIAGNOSIS — M62.89 PELVIC FLOOR DYSFUNCTION: Primary | ICD-10-CM

## 2023-10-24 DIAGNOSIS — Z12.39 BREAST CANCER SCREENING OTHER THAN MAMMOGRAM: ICD-10-CM

## 2023-10-24 PROCEDURE — 97530 THERAPEUTIC ACTIVITIES: CPT | Mod: PN,CQ

## 2023-10-24 PROCEDURE — 97112 NEUROMUSCULAR REEDUCATION: CPT | Mod: PN,CQ

## 2023-10-24 PROCEDURE — 77067 MAMMO DIGITAL SCREENING BILAT WITH TOMO: ICD-10-PCS | Mod: 26,,, | Performed by: RADIOLOGY

## 2023-10-24 PROCEDURE — 77067 SCR MAMMO BI INCL CAD: CPT | Mod: TC

## 2023-10-24 PROCEDURE — 77063 MAMMO DIGITAL SCREENING BILAT WITH TOMO: ICD-10-PCS | Mod: 26,,, | Performed by: RADIOLOGY

## 2023-10-24 PROCEDURE — 77067 SCR MAMMO BI INCL CAD: CPT | Mod: 26,,, | Performed by: RADIOLOGY

## 2023-10-24 PROCEDURE — 77063 BREAST TOMOSYNTHESIS BI: CPT | Mod: 26,,, | Performed by: RADIOLOGY

## 2023-10-24 NOTE — PROGRESS NOTES
[facility]   Physical Therapy Treatment Note      Name: Estefany Berumen  Clinic Number: [id number]    Therapy Diagnosis:   Encounter Diagnosis   Name Primary?    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 She continues to deal with stress incontinence.   She was compliant with home exercise program.  Response to previous treatment: no complaints  Functional change: nothing significant    Pain: 0/10  Location: vaginal canal    Objective      Objective Measures updated at progress report unless specified.     Treatment     Estefnay received the treatments listed below:      therapeutic exercises to develop strength, endurance, ROM, flexibility, posture, and core stabilization for --- minutes including:  ---    Manual Therapy to improve flexibility and extensibility for 00 minutes including:      Internal trigger point release to bilateral obturators internus and bilateral superficial transverse perineal- patient [place] pelvic floor muscles and increasing tension with diaphragmatic breathing     Therapeutic Activity to improve dynamic functional performance for 10 minutes including:     Reviewed HEP  Education on double voiding techniques  Education on urge control  Pessary use  Pelvic  floor benefits following surgery    neuromuscular re-education activities to improve: Coordination, Kinesthetic, Sense, Proprioception, and Posture for 40 minutes. The following activities were included:    Diaphragmatic breathing with tactile and verbal cues for lateral rib excursion  + butterfly pose  TA contractions 2x10  +Bridges 2x10  Supine Ta  Cat cows x15 - cues for deep breathing techniques     PATIENT EDUCATION AND HOME EXERCISES      Education provided:   general anatomy/physiology of urinary/ bowel  system, benefits of treatment, risks of treatment, and alternative methods of treatment were discussed with the patient. Additionally, anatomy/physiology of pelvic floor, bladder retraining, diaphragmatic breathing, double voiding techniques, isometric abdominal exercises, kegels, proper bearing down techniques, and fluid intake/dietary modifications were reviewed.      Written Home Exercises provided: yes.  Exercises were reviewed and Estefany was able to demonstrate them prior to the end of the session. Estefany demonstrated good  understanding of the education provided. See EMR under Patient Instructions for exercises provided during therapy sessions.    Assessment     Patient presents to therapy with reports of stress incontinence and prolapse which limits activities. Patient demonstrates decreased lateral rib excursion with diaphragmatic breathing therefore tactile cuing provided to improve technique. She has tendency to shallow breathe with improvement noted with repetitions. Patient has decreased transverse abdominus strength therefore worked on holding in ta with dynamic movements. No adverse symptoms noted during today's session focused on core strengthening and pelvic mobility    Estefany Is progressing well towards her goals.   Patient prognosis is Good.     Patient will continue to benefit from skilled outpatient physical therapy to address the deficits listed in the problem list box on initial  evaluation, provide pt/family education and to maximize pt's level of independence in the home and community environment.     Patient's spiritual, cultural and educational needs considered and pt agreeable to plan of care and goals.     Anticipated barriers to physical therapy: none    Goals:  Short Term Goals: 5 weeks   - Pt will demonstrate excellent knowledge and adherence to HEP to facilitate optimal recovery.  - Pt will demonstrate proper PFM contraction, relaxation, and lengthening coordinated with TA and breath for improved muscle coordination needed for functional activity.  - Pt to show proper diaphragmatic breathing to promote relaxation and pelvic floor functional mobility and to help with calming the nervous system in order to decrease pain and improve QOL.  - Pt will report improved ability to initiate urine stream and completely empty without straining to demonstrate improved PF relaxation for proper voiding.  - Pt to report a decrease in pelvic pressure and fullness to no more than 50% of the time to demonstrate improving pelvic support of pelvic structures.  - Pt will report no incidence of urinary incontinence 4/7 days for improved hygiene and ADL/IADL tolerance.      Long Term Goals: 10 weeks   - Pt will demonstrate excellent knowledge and adherence to HEP for continued self-maintenance of symptoms.  - Pt will report FOTO score on PFDI Prolapse of 10 limited or less indicating clinically relevant increase in function.  - Pt to demonstrate proper pressure management with all functional mobility including blowing out with exertion and activating pelvic floor + transverse abdominus to demonstrate improving pelvic organ support for improved ADL participation.  - Pt to report a decrease in pelvic pressure and fullness to no more than 25% of the time to demonstrate improving pelvic support of pelvic structures.  - Pt will report ability to delay urinary urge for at least 25 minutes to maintain continence  with ADL/IADLs.   - Pt will report no incidence of urinary incontinence 6/7 days for improved hygiene and ADL/IADL tolerance.   - Pt will report needing </= 2 pad/day indicating improved PFM function needed to maintain continence  - Pt will demonstrate PFM strength of at least 3/5 MMT for improved strength needed to maintain continence.   - Pt will report bearing down appropriately 100% of the time for improved bowel function and decreased stress on adjacent pelvic structures.   - Pt will report ability to tolerate speculum exam without pain for improved access to healthcare.     PLAN      Plan of care Certification: 10/17/2023 to 12/26/2023.     Outpatient Physical Therapy 1-2 time(s) every 1 week(s) for 10 weeks to include the following interventions: Electrical Stimulation TENS/IFC, Manual Therapy, Moist Heat/ Ice, Neuromuscular Re-ed, Patient Education, Self Care, Therapeutic Activities, Therapeutic Exercise, and FDN and ASTYM.       Nighat Monte, PTA

## 2023-10-25 NOTE — PROGRESS NOTES
I am happy to report that your recent breast imaging did NOT show evidence of cancer. An annual mammogram is the best test to screen for breast cancer, but it is not perfect, and it can miss some cancers. So, even though your mammogram was normal, if you notice any lump or change in one of your breasts, please schedule an appointment with me for a proper evaluation. Thank you for letting me care for you. I look forward to seeing you again. Sincerely, Dr. Tigist Mejia

## 2023-10-30 NOTE — PROGRESS NOTES
[facility]   Physical Therapy Treatment Note      Name: Estefany Berumen  Clinic Number: [id number]    Therapy Diagnosis:   Encounter Diagnosis   Name Primary?    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 When she walks too much she feels the heaviness. Goes away when she pushes up.    She was compliant with home exercise program.  Response to previous treatment: no complaints  Functional change: nothing significant    Pain: 0/10  Location: vaginal canal    Objective      Objective Measures updated at progress report unless specified.     Treatment     Estefany received the treatments listed below:      therapeutic exercises to develop strength, endurance, ROM, flexibility, posture, and core stabilization for --- minutes including:  ---    Manual Therapy to improve flexibility and extensibility for 08 minutes including:      STM to BAHMAN adductors in butterfly pose      Therapeutic Activity to improve dynamic functional performance for 00 minutes including:     Reviewed HEP  Education on double voiding techniques  Education on urge control  Pessary use  Pelvic floor benefits following surgery    neuromuscular re-education activities to improve: Coordination, Kinesthetic, Sense, Proprioception, and Posture for 40 minutes. The  following activities were included:    Diaphragmatic breathing with tactile and verbal cues for lateral rib excursion  + butterfly pose with bolster support under knees   TA contractions 2x10  + bridges 2x10  + ball squeeze 2x10   Child pose rock backs 2x10  Cat cows x15 - cues for deep breathing techniques       PATIENT EDUCATION AND HOME EXERCISES      Education provided:   general anatomy/physiology of urinary/ bowel  system, benefits of treatment, risks of treatment, and alternative methods of treatment were discussed with the patient. Additionally, anatomy/physiology of pelvic floor, bladder retraining, diaphragmatic breathing, double voiding techniques, isometric abdominal exercises, kegels, proper bearing down techniques, and fluid intake/dietary modifications were reviewed.      Written Home Exercises provided: yes.  Exercises were reviewed and Estefany was able to demonstrate them prior to the end of the session. Estefany demonstrated good  understanding of the education provided. See EMR under Patient Instructions for exercises provided during therapy sessions.    Assessment     Estefany tolerated therapy session well with no complaints of pain throughout session. Continued focusing on pelvic floor relaxation and down training. Educated patient on relationship between diaphragm and pelvic floor muscles with deep breathing. Verbal and tactile cues needed for 360 breathing. Performed soft tissue mobilization to bilateral adductors to muscle tension and tone. Continue progressing in POC as tolerated.     Estefany Is progressing well towards her goals.   Patient prognosis is Good.     Patient will continue to benefit from skilled outpatient physical therapy to address the deficits listed in the problem list box on initial evaluation, provide pt/family education and to maximize pt's level of independence in the home and community environment.     Patient's spiritual, cultural and educational needs considered and pt  agreeable to plan of care and goals.     Anticipated barriers to physical therapy: none    Goals:  Short Term Goals: 5 weeks   - Pt will demonstrate excellent knowledge and adherence to HEP to facilitate optimal recovery.  - Pt will demonstrate proper PFM contraction, relaxation, and lengthening coordinated with TA and breath for improved muscle coordination needed for functional activity.  - Pt to show proper diaphragmatic breathing to promote relaxation and pelvic floor functional mobility and to help with calming the nervous system in order to decrease pain and improve QOL.  - Pt will report improved ability to initiate urine stream and completely empty without straining to demonstrate improved PF relaxation for proper voiding.  - Pt to report a decrease in pelvic pressure and fullness to no more than 50% of the time to demonstrate improving pelvic support of pelvic structures.  - Pt will report no incidence of urinary incontinence 4/7 days for improved hygiene and ADL/IADL tolerance.      Long Term Goals: 10 weeks   - Pt will demonstrate excellent knowledge and adherence to HEP for continued self-maintenance of symptoms.  - Pt will report FOTO score on PFDI Prolapse of 10 limited or less indicating clinically relevant increase in function.  - Pt to demonstrate proper pressure management with all functional mobility including blowing out with exertion and activating pelvic floor + transverse abdominus to demonstrate improving pelvic organ support for improved ADL participation.  - Pt to report a decrease in pelvic pressure and fullness to no more than 25% of the time to demonstrate improving pelvic support of pelvic structures.  - Pt will report ability to delay urinary urge for at least 25 minutes to maintain continence with ADL/IADLs.   - Pt will report no incidence of urinary incontinence 6/7 days for improved hygiene and ADL/IADL tolerance.   - Pt will report needing </= 2 pad/day indicating improved PFM  function needed to maintain continence  - Pt will demonstrate PFM strength of at least 3/5 MMT for improved strength needed to maintain continence.   - Pt will report bearing down appropriately 100% of the time for improved bowel function and decreased stress on adjacent pelvic structures.   - Pt will report ability to tolerate speculum exam without pain for improved access to healthcare.     PLAN      Plan of care Certification: 10/17/2023 to 12/26/2023.     Outpatient Physical Therapy 1-2 time(s) every 1 week(s) for 10 weeks to include the following interventions: Electrical Stimulation TENS/IFC, Manual Therapy, Moist Heat/ Ice, Neuromuscular Re-ed, Patient Education, Self Care, Therapeutic Activities, Therapeutic Exercise, and FDN and ASTYM.       Cata Dominique, PT

## 2023-10-31 ENCOUNTER — CLINICAL SUPPORT (OUTPATIENT)
Dept: REHABILITATION | Facility: HOSPITAL | Age: 48
End: 2023-10-31
Payer: COMMERCIAL

## 2023-10-31 DIAGNOSIS — M62.89 PELVIC FLOOR DYSFUNCTION: Primary | ICD-10-CM

## 2023-10-31 PROCEDURE — 97140 MANUAL THERAPY 1/> REGIONS: CPT | Mod: PN

## 2023-10-31 PROCEDURE — 97112 NEUROMUSCULAR REEDUCATION: CPT | Mod: PN

## 2023-11-08 ENCOUNTER — CLINICAL SUPPORT (OUTPATIENT)
Dept: REHABILITATION | Facility: HOSPITAL | Age: 48
End: 2023-11-08
Payer: COMMERCIAL

## 2023-11-08 ENCOUNTER — DOCUMENTATION ONLY (OUTPATIENT)
Dept: REHABILITATION | Facility: HOSPITAL | Age: 48
End: 2023-11-08
Payer: COMMERCIAL

## 2023-11-08 DIAGNOSIS — M62.89 PELVIC FLOOR DYSFUNCTION: Primary | ICD-10-CM

## 2023-11-08 PROCEDURE — 97112 NEUROMUSCULAR REEDUCATION: CPT | Mod: PN,CQ

## 2023-11-08 NOTE — PROGRESS NOTES
[facility]   Physical Therapy Treatment Note      Name: Estefany Berumen  Clinic Number: [id number]    Therapy Diagnosis:   Encounter Diagnosis   Name Primary?    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 She got MRI results which show a cystocele so she is awaiting referral to urogyn to have surgery. She has appointment with OB/GYN on Friday.  She was compliant with home exercise program.  Response to previous treatment: no complaints  Functional change: nothing significant    Pain: 0/10  Location: vaginal canal    Objective      Objective Measures updated at progress report unless specified.     Treatment     Estefany received the treatments listed below:      therapeutic exercises to develop strength, endurance, ROM, flexibility, posture, and core stabilization for --- minutes including:  ---    Manual Therapy to improve flexibility and extensibility for 0 minutes including:      STM to BAHMAN adductors in butterfly pose      Therapeutic Activity to improve dynamic functional performance for 0 minutes including:     Reviewed HEP  Education on double voiding techniques  Education on urge control  Pessary use  Pelvic floor benefits following surgery    neuromuscular re-education activities to improve: Coordination, Kinesthetic, Sense,  Proprioception, and Posture for 55 minutes. The following activities were included:    Diaphragmatic breathing with tactile and verbal cues for lateral rib excursion  + butterfly pose with bolster suppoChild pose 2x10  +Cat cows x15  +child's pose  + open books x10 B  TA contractions 2x10  + bridges 2x10  + ball squeeze 2x10   + marching (not alternating)   + heel slides 2 x 5 (not alternating)  + bent knee fall out 2 x 5 (not alternating)     PATIENT EDUCATION AND HOME EXERCISES      Education provided:   general anatomy/physiology of urinary/ bowel  system, benefits of treatment, risks of treatment, and alternative methods of treatment were discussed with the patient. Additionally, anatomy/physiology of pelvic floor, bladder retraining, diaphragmatic breathing, double voiding techniques, isometric abdominal exercises, kegels, proper bearing down techniques, and fluid intake/dietary modifications were reviewed.      Written Home Exercises provided: yes.  Exercises were reviewed and Estefany was able to demonstrate them prior to the end of the session. Estefany demonstrated good  understanding of the education provided. See EMR under Patient Instructions for exercises provided during therapy sessions.    Assessment     Estefany continued to work on breathing technique with manual cues for full lateral rib excursion. Patient worked on more core strengthening exercises to improve transverse abdominus and pelvic support contributing to prolapse. Discussed internal exam to address tension in pelvic floor muscles and agreeable to trigger point release and coordination assessment at further date to address urinary stress incontinence .     Estefany Is progressing well towards her goals.   Patient prognosis is Good.     Patient will continue to benefit from skilled outpatient physical therapy to address the deficits listed in the problem list box on initial evaluation, provide pt/family education and to maximize pt's level of  independence in the home and community environment.     Patient's spiritual, cultural and educational needs considered and pt agreeable to plan of care and goals.     Anticipated barriers to physical therapy: none    Goals:  Short Term Goals: 5 weeks   - Pt will demonstrate excellent knowledge and adherence to HEP to facilitate optimal recovery.  - Pt will demonstrate proper PFM contraction, relaxation, and lengthening coordinated with TA and breath for improved muscle coordination needed for functional activity.  - Pt to show proper diaphragmatic breathing to promote relaxation and pelvic floor functional mobility and to help with calming the nervous system in order to decrease pain and improve QOL.  - Pt will report improved ability to initiate urine stream and completely empty without straining to demonstrate improved PF relaxation for proper voiding.  - Pt to report a decrease in pelvic pressure and fullness to no more than 50% of the time to demonstrate improving pelvic support of pelvic structures.  - Pt will report no incidence of urinary incontinence 4/7 days for improved hygiene and ADL/IADL tolerance.      Long Term Goals: 10 weeks   - Pt will demonstrate excellent knowledge and adherence to HEP for continued self-maintenance of symptoms.  - Pt will report FOTO score on PFDI Prolapse of 10 limited or less indicating clinically relevant increase in function.  - Pt to demonstrate proper pressure management with all functional mobility including blowing out with exertion and activating pelvic floor + transverse abdominus to demonstrate improving pelvic organ support for improved ADL participation.  - Pt to report a decrease in pelvic pressure and fullness to no more than 25% of the time to demonstrate improving pelvic support of pelvic structures.  - Pt will report ability to delay urinary urge for at least 25 minutes to maintain continence with ADL/IADLs.   - Pt will report no incidence of urinary  incontinence 6/7 days for improved hygiene and ADL/IADL tolerance.   - Pt will report needing </= 2 pad/day indicating improved PFM function needed to maintain continence  - Pt will demonstrate PFM strength of at least 3/5 MMT for improved strength needed to maintain continence.   - Pt will report bearing down appropriately 100% of the time for improved bowel function and decreased stress on adjacent pelvic structures.   - Pt will report ability to tolerate speculum exam without pain for improved access to healthcare.     PLAN      Plan of care Certification: 10/17/2023 to 12/26/2023.     Outpatient Physical Therapy 1-2 time(s) every 1 week(s) for 10 weeks to include the following interventions: Electrical Stimulation TENS/IFC, Manual Therapy, Moist Heat/ Ice, Neuromuscular Re-ed, Patient Education, Self Care, Therapeutic Activities, Therapeutic Exercise, and FDN and ASTYM.       Nighat Monte, PTA

## 2023-11-08 NOTE — PROGRESS NOTES
PT/PTA met face to face to discuss pt's treatment plan and progress towards established goals. Pt will be seen by a physical therapist minimally every 6th visit or every 30 days.    Nighat Monte PTA

## 2023-11-10 ENCOUNTER — OFFICE VISIT (OUTPATIENT)
Dept: OBSTETRICS AND GYNECOLOGY | Facility: CLINIC | Age: 48
End: 2023-11-10
Payer: COMMERCIAL

## 2023-11-10 ENCOUNTER — PATIENT MESSAGE (OUTPATIENT)
Dept: OBSTETRICS AND GYNECOLOGY | Facility: CLINIC | Age: 48
End: 2023-11-10

## 2023-11-10 ENCOUNTER — TELEPHONE (OUTPATIENT)
Dept: OBSTETRICS AND GYNECOLOGY | Facility: CLINIC | Age: 48
End: 2023-11-10

## 2023-11-10 VITALS
HEIGHT: 65 IN | SYSTOLIC BLOOD PRESSURE: 122 MMHG | WEIGHT: 212.5 LBS | DIASTOLIC BLOOD PRESSURE: 80 MMHG | BODY MASS INDEX: 35.4 KG/M2

## 2023-11-10 DIAGNOSIS — N81.2 INCOMPLETE UTERINE PROLAPSE: Primary | ICD-10-CM

## 2023-11-10 PROCEDURE — 99999 PR PBB SHADOW E&M-EST. PATIENT-LVL III: ICD-10-PCS | Mod: PBBFAC,,, | Performed by: OBSTETRICS & GYNECOLOGY

## 2023-11-10 PROCEDURE — 3079F DIAST BP 80-89 MM HG: CPT | Mod: CPTII,S$GLB,, | Performed by: OBSTETRICS & GYNECOLOGY

## 2023-11-10 PROCEDURE — 99214 PR OFFICE/OUTPT VISIT, EST, LEVL IV, 30-39 MIN: ICD-10-PCS | Mod: S$GLB,,, | Performed by: OBSTETRICS & GYNECOLOGY

## 2023-11-10 PROCEDURE — 1159F PR MEDICATION LIST DOCUMENTED IN MEDICAL RECORD: ICD-10-PCS | Mod: CPTII,S$GLB,, | Performed by: OBSTETRICS & GYNECOLOGY

## 2023-11-10 PROCEDURE — 99214 OFFICE O/P EST MOD 30 MIN: CPT | Mod: S$GLB,,, | Performed by: OBSTETRICS & GYNECOLOGY

## 2023-11-10 PROCEDURE — 1159F MED LIST DOCD IN RCRD: CPT | Mod: CPTII,S$GLB,, | Performed by: OBSTETRICS & GYNECOLOGY

## 2023-11-10 PROCEDURE — 3008F BODY MASS INDEX DOCD: CPT | Mod: CPTII,S$GLB,, | Performed by: OBSTETRICS & GYNECOLOGY

## 2023-11-10 PROCEDURE — 3079F PR MOST RECENT DIASTOLIC BLOOD PRESSURE 80-89 MM HG: ICD-10-PCS | Mod: CPTII,S$GLB,, | Performed by: OBSTETRICS & GYNECOLOGY

## 2023-11-10 PROCEDURE — 99999 PR PBB SHADOW E&M-EST. PATIENT-LVL III: CPT | Mod: PBBFAC,,, | Performed by: OBSTETRICS & GYNECOLOGY

## 2023-11-10 PROCEDURE — 3008F PR BODY MASS INDEX (BMI) DOCUMENTED: ICD-10-PCS | Mod: CPTII,S$GLB,, | Performed by: OBSTETRICS & GYNECOLOGY

## 2023-11-10 PROCEDURE — 3074F PR MOST RECENT SYSTOLIC BLOOD PRESSURE < 130 MM HG: ICD-10-PCS | Mod: CPTII,S$GLB,, | Performed by: OBSTETRICS & GYNECOLOGY

## 2023-11-10 PROCEDURE — 3074F SYST BP LT 130 MM HG: CPT | Mod: CPTII,S$GLB,, | Performed by: OBSTETRICS & GYNECOLOGY

## 2023-11-10 RX ORDER — TRIAMCINOLONE ACETONIDE 1 MG/G
OINTMENT TOPICAL 2 TIMES DAILY
Qty: 80 G | Refills: 1 | Status: SHIPPED | OUTPATIENT
Start: 2023-11-10 | End: 2024-03-04

## 2023-11-10 RX ORDER — FLUCONAZOLE 200 MG/1
200 TABLET ORAL DAILY
Qty: 3 TABLET | Refills: 0 | Status: SHIPPED | OUTPATIENT
Start: 2023-11-10 | End: 2023-11-13

## 2023-11-10 RX ORDER — VIBEGRON 75 MG/1
75 TABLET, FILM COATED ORAL DAILY
Qty: 90 TABLET | Refills: 3 | Status: SHIPPED | OUTPATIENT
Start: 2023-11-10 | End: 2024-11-09

## 2023-11-10 NOTE — PROGRESS NOTES
Subjective:       Patient ID: Estefany Berumen is a 47 y.o. female.    Chief Complaint:  No chief complaint on file.      History of Present Illness  HPI  Here for problems  Requesting refill on diflucan and triamsinolone ointment  Also requesting rx for gemtesa--received samples from urology and wishes to continue    Reports being seen by colorectal surgeon and had mri    The survey T2 sequences demonstrate that the uterus and ovaries are present. The uterus is retroflexed. There is trace nonspecific free pelvic fluid.     At rest, the anorectal junction lies 2.8 cm below the level of the pubococcygeal line. The patient demonstrates good ability to voluntarily contract the puborectalis muscle.     With defecation, the anorectal junction descends only slightly 3.5 cm below the pubococcygeal line. There is a small transient cystocele. There is no evidence of anterior rectocele. The puborectalis muscle does not relax appropriately.      Pt reports she can feel something hanging  Denies problems passing stool or urine  GYN & OB History  No LMP recorded. (Menstrual status: Birth Control).   Date of Last Pap: No result found    OB History    Para Term  AB Living   6 1 1   5 1   SAB IAB Ectopic Multiple Live Births   5       1      # Outcome Date GA Lbr Dev/2nd Weight Sex Delivery Anes PTL Lv   6 SAB            5 SAB            4 SAB            3 SAB            2 SAB            1 Term      Vag-Spont   GERMAN       Review of Systems  Review of Systems   All other systems reviewed and are negative.          Objective:      Physical Exam:   Constitutional: She appears well-developed.     Eyes: Pupils are equal, round, and reactive to light. Conjunctivae and EOM are normal.      Pulmonary/Chest: Effort normal. Right breast exhibits no mass, no nipple discharge, no skin change, no tenderness and presence. Left breast exhibits no mass, no nipple discharge, no skin change, no tenderness and presence. Breasts are  symmetrical.        Abdominal: Soft.     Genitourinary:    Inguinal canal, vagina, uterus, right adnexa, left adnexa and rectum normal.      Pelvic exam was performed with patient supine.   The external female genitalia was normal.   Labial bartholins normal.Cervix is normal. Uterus is uterine prolapse. Normal urethral meatus.          Musculoskeletal: Normal range of motion.       Neurological: She is alert.    Skin: Skin is warm.    Psychiatric: She has a normal mood and affect.           Assessment:        1. Incomplete uterine prolapse               Plan:      No prolapse of uterus/cervix at rest  With valsalva, cervix protrudes to introitus  No significant cystocele or rectocel noted  No decent of vaginal side walls    Reviewed options--tvh, pessary  Pt does not want uterus removed  Msg sent to Dr Candelaria for his input    Rx sent for diflcuan/triamcinolone per pt request  Plans to pursue pelvic floor PT--(puborectalis muscle does not relax)    Rx sent for gemtesa per pt request

## 2023-11-13 ENCOUNTER — CLINICAL SUPPORT (OUTPATIENT)
Dept: REHABILITATION | Facility: HOSPITAL | Age: 48
End: 2023-11-13
Payer: COMMERCIAL

## 2023-11-13 DIAGNOSIS — M62.89 PELVIC FLOOR DYSFUNCTION: Primary | ICD-10-CM

## 2023-11-13 PROCEDURE — 97112 NEUROMUSCULAR REEDUCATION: CPT | Mod: PN

## 2023-11-13 NOTE — PROGRESS NOTES
[facility]   Physical Therapy Treatment Note      Name: Estfeany Berumen  Clinic Number: [id number]    Therapy Diagnosis:   Encounter Diagnosis   Name Primary?    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 Will have discussion to see if surgery is appropriate.   She was compliant with home exercise program.  Response to previous treatment: no complaints  Functional change: nothing significant    Pain: 0/10   Location: vaginal canal    Objective      Objective Measures updated at progress report unless specified.     Transverse abdominus contraction: poor to fair     Treatment     Estefany received the treatments listed below:      therapeutic exercises to develop strength, endurance, ROM, flexibility, posture, and core stabilization for --- minutes including:  ---    Manual Therapy to improve flexibility and extensibility for 0 minutes including:      STM to BAHMAN adductors in butterfly pose      Therapeutic Activity to improve dynamic functional performance for 0 minutes including:     Reviewed HEP  Education on double voiding techniques  Education on urge control  Pessary use  Pelvic floor benefits following surgery    neuromuscular re-education activities to improve: Coordination, Kinesthetic, Sense, Proprioception, and Posture for 41 minutes. The following activities  were included:    Diaphragmatic breathing with tactile and verbal cues for lateral rib excursion  + butterfly pose 2 min.   +Cat cows x15  +child's pose  TA contractions 2x10  + bridges 2x10  + ball squeeze 2x10   + bent knee fall out 2 x 5 (not alternating)  Posterior pelvic tilts x15    Kegels in hooklying       PATIENT EDUCATION AND HOME EXERCISES      Education provided:   general anatomy/physiology of urinary/ bowel  system, benefits of treatment, risks of treatment, and alternative methods of treatment were discussed with the patient. Additionally, anatomy/physiology of pelvic floor, bladder retraining, diaphragmatic breathing, double voiding techniques, isometric abdominal exercises, kegels, proper bearing down techniques, and fluid intake/dietary modifications were reviewed.      Written Home Exercises provided: yes.  Exercises were reviewed and Estefany was able to demonstrate them prior to the end of the session. Estefany demonstrated good  understanding of the education provided. See EMR under Patient Instructions for exercises provided during therapy sessions.    Assessment     Estefany tolerated therapy session well with no new complaints of pain. Patient having mild urge to void at beginning of session. Patient able to perform quick flicks to decrease urge. Patient reporting stronger urgency during relaxation stretches and the need to void.  Patient reports not voiding a full bladder. After voiding, she continued performing relaxation stretches, and reporting feeling of leakage while performing. Education provided on pressure management and decreasing abdominal pressure on tight pelvic floor. Education patient on sEMG and biofeedback. Continued focus on relaxation of pelvic floor muscles and urge control. Continue progressing in POC as tolerated.     Estefany Is progressing well towards her goals.   Patient prognosis is Good.     Patient will continue to benefit from skilled outpatient physical therapy to  address the deficits listed in the problem list box on initial evaluation, provide pt/family education and to maximize pt's level of independence in the home and community environment.     Patient's spiritual, cultural and educational needs considered and pt agreeable to plan of care and goals.     Anticipated barriers to physical therapy: none    Goals:  Short Term Goals: 5 weeks   - Pt will demonstrate excellent knowledge and adherence to HEP to facilitate optimal recovery. Progressing   - Pt will demonstrate proper PFM contraction, relaxation, and lengthening coordinated with TA and breath for improved muscle coordination needed for functional activity. Progressing   - Pt to show proper diaphragmatic breathing to promote relaxation and pelvic floor functional mobility and to help with calming the nervous system in order to decrease pain and improve QOL. Progressing   - Pt will report improved ability to initiate urine stream and completely empty without straining to demonstrate improved PF relaxation for proper voiding. Progressing   - Pt to report a decrease in pelvic pressure and fullness to no more than 50% of the time to demonstrate improving pelvic support of pelvic structures. Progressing   - Pt will report no incidence of urinary incontinence 4/7 days for improved hygiene and ADL/IADL tolerance. Progressing      Long Term Goals: 10 weeks   - Pt will demonstrate excellent knowledge and adherence to HEP for continued self-maintenance of symptoms. Progressing   - Pt will report FOTO score on PFDI Prolapse of 10 limited or less indicating clinically relevant increase in function. Progressing   - Pt to demonstrate proper pressure management with all functional mobility including blowing out with exertion and activating pelvic floor + transverse abdominus to demonstrate improving pelvic organ support for improved ADL participation. Progressing   - Pt to report a decrease in pelvic pressure and fullness to no  more than 25% of the time to demonstrate improving pelvic support of pelvic structures. Progressing   - Pt will report ability to delay urinary urge for at least 25 minutes to maintain continence with ADL/IADLs. Progressing   - Pt will report no incidence of urinary incontinence 6/7 days for improved hygiene and ADL/IADL tolerance. Progressing   - Pt will report needing </= 2 pad/day indicating improved PFM function needed to maintain continence. Progressing   - Pt will demonstrate PFM strength of at least 3/5 MMT for improved strength needed to maintain continence. Progressing   - Pt will report bearing down appropriately 100% of the time for improved bowel function and decreased stress on adjacent pelvic structures. Progressing   - Pt will report ability to tolerate speculum exam without pain for improved access to healthcare. Progressing      PLAN      Plan of care Certification: 10/17/2023 to 12/26/2023.     Outpatient Physical Therapy 1-2 time(s) every 1 week(s) for 10 weeks to include the following interventions: Electrical Stimulation TENS/IFC, Manual Therapy, Moist Heat/ Ice, Neuromuscular Re-ed, Patient Education, Self Care, Therapeutic Activities, Therapeutic Exercise, and FDN and ASTYM.       Cata Dominique, PT

## 2023-11-22 ENCOUNTER — CLINICAL SUPPORT (OUTPATIENT)
Dept: REHABILITATION | Facility: HOSPITAL | Age: 48
End: 2023-11-22
Payer: COMMERCIAL

## 2023-11-22 DIAGNOSIS — M62.89 PELVIC FLOOR DYSFUNCTION: Primary | ICD-10-CM

## 2023-11-22 PROCEDURE — 97112 NEUROMUSCULAR REEDUCATION: CPT | Mod: PN,CQ

## 2023-11-22 NOTE — PROGRESS NOTES
[facility]   Physical Therapy Treatment Note      Name: Estefany Berumen  Clinic Number: [id number]    Therapy Diagnosis:   Encounter Diagnosis   Name Primary?    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   FOTO documents entered into EPIC - see Media section.     Limitation Score: PFDI Prolapse 21        Precautions: Standard     Time In: 9:50am  Time Out: 10:30am  Total Appointment Time (timed & untimed codes): 40 minutes    PTA Visit #: 1/5       Subjective     Patient reports: stress related incontinence. She feels hopeful now that she has appointment scheduled to discuss the next steps.  She was compliant with home exercise program.  Response to previous treatment: no complaints  Functional change: nothing significant    Pain: 0/10   Location: vaginal canal    Objective      Objective Measures updated at progress report unless specified.     Treatment     Estefany received the treatments listed below:      therapeutic exercises to develop strength, endurance, ROM, flexibility, posture, and core stabilization for --- minutes including:  ---    Manual Therapy to improve flexibility and extensibility for 0 minutes including:      STM to BAHMAN adductors in butterfly pose      Therapeutic Activity to improve dynamic functional performance for 0 minutes including:     Reviewed HEP  Education on double voiding techniques  Education on urge  control  Pessary use  Pelvic floor benefits following surgery    neuromuscular re-education activities to improve: Coordination, Kinesthetic, Sense, Proprioception, and Posture for 40 minutes. The following activities were included:    Clamshell x10 B  Diaphragmatic breathing with tactile and verbal cues for lateral rib excursion  +Cat cows x15  +child's pose  + open books x10 B  TA contractions supine and quadruped 2x10  + kegels  + bridges 2x10  + ball squeeze 2x10   + arm lifts in quadruped  + marching (not alternating)   + heel slides 2 x 5 (not alternating)  + bent knee fall out 2 x 5 (not alternating)  Posterior pelvic tilts x15  Kegels in hooklying       PATIENT EDUCATION AND HOME EXERCISES      Education provided:   general anatomy/physiology of urinary/ bowel  system, benefits of treatment, risks of treatment, and alternative methods of treatment were discussed with the patient. Additionally, anatomy/physiology of pelvic floor, bladder retraining, diaphragmatic breathing, double voiding techniques, isometric abdominal exercises, kegels, proper bearing down techniques, and fluid intake/dietary modifications were reviewed.      Written Home Exercises provided: yes.  Exercises were reviewed and Estefany was able to demonstrate them prior to the end of the session. Estefany demonstrated good  understanding of the education provided. See EMR under Patient Instructions for exercises provided during therapy sessions.    Assessment     Estefany performed more core strengthening with working on coordination of pelvic floor muscle contraction. Patient did not have any urge to void throughout session and only reports soreness. Tactile cues provided for proper core engagement.     Estefany Is progressing well towards her goals.   Patient prognosis is Good.     Patient will continue to benefit from skilled outpatient physical therapy to address the deficits listed in the problem list box on initial evaluation, provide pt/family  education and to maximize pt's level of independence in the home and community environment.     Patient's spiritual, cultural and educational needs considered and pt agreeable to plan of care and goals.     Anticipated barriers to physical therapy: none    Goals:  Short Term Goals: 5 weeks   - Pt will demonstrate excellent knowledge and adherence to HEP to facilitate optimal recovery. Progressing   - Pt will demonstrate proper PFM contraction, relaxation, and lengthening coordinated with TA and breath for improved muscle coordination needed for functional activity. Progressing   - Pt to show proper diaphragmatic breathing to promote relaxation and pelvic floor functional mobility and to help with calming the nervous system in order to decrease pain and improve QOL. Progressing   - Pt will report improved ability to initiate urine stream and completely empty without straining to demonstrate improved PF relaxation for proper voiding. Progressing   - Pt to report a decrease in pelvic pressure and fullness to no more than 50% of the time to demonstrate improving pelvic support of pelvic structures. Progressing   - Pt will report no incidence of urinary incontinence 4/7 days for improved hygiene and ADL/IADL tolerance. Progressing      Long Term Goals: 10 weeks   - Pt will demonstrate excellent knowledge and adherence to HEP for continued self-maintenance of symptoms. Progressing   - Pt will report FOTO score on PFDI Prolapse of 10 limited or less indicating clinically relevant increase in function. Progressing   - Pt to demonstrate proper pressure management with all functional mobility including blowing out with exertion and activating pelvic floor + transverse abdominus to demonstrate improving pelvic organ support for improved ADL participation. Progressing   - Pt to report a decrease in pelvic pressure and fullness to no more than 25% of the time to demonstrate improving pelvic support of pelvic structures.  Progressing   - Pt will report ability to delay urinary urge for at least 25 minutes to maintain continence with ADL/IADLs. Progressing   - Pt will report no incidence of urinary incontinence 6/7 days for improved hygiene and ADL/IADL tolerance. Progressing   - Pt will report needing </= 2 pad/day indicating improved PFM function needed to maintain continence. Progressing   - Pt will demonstrate PFM strength of at least 3/5 MMT for improved strength needed to maintain continence. Progressing   - Pt will report bearing down appropriately 100% of the time for improved bowel function and decreased stress on adjacent pelvic structures. Progressing   - Pt will report ability to tolerate speculum exam without pain for improved access to healthcare. Progressing      PLAN      Plan of care Certification: 10/17/2023 to 12/26/2023.     Outpatient Physical Therapy 1-2 time(s) every 1 week(s) for 10 weeks to include the following interventions: Electrical Stimulation TENS/IFC, Manual Therapy, Moist Heat/ Ice, Neuromuscular Re-ed, Patient Education, Self Care, Therapeutic Activities, Therapeutic Exercise, and FDN and ASTYM.       Nighat Monte, PTA

## 2023-11-28 ENCOUNTER — DOCUMENTATION ONLY (OUTPATIENT)
Dept: REHABILITATION | Facility: HOSPITAL | Age: 48
End: 2023-11-28

## 2023-11-28 ENCOUNTER — CLINICAL SUPPORT (OUTPATIENT)
Dept: REHABILITATION | Facility: HOSPITAL | Age: 48
End: 2023-11-28
Payer: COMMERCIAL

## 2023-11-28 DIAGNOSIS — M62.89 PELVIC FLOOR DYSFUNCTION: Primary | ICD-10-CM

## 2023-11-28 NOTE — PROGRESS NOTES
Patient came to therapy worked on core and pelvic floor strengthening briefly then patient had to leave to  ill daughter from school. Appointment was cancelled.

## 2023-12-05 ENCOUNTER — CLINICAL SUPPORT (OUTPATIENT)
Dept: REHABILITATION | Facility: HOSPITAL | Age: 48
End: 2023-12-05
Payer: COMMERCIAL

## 2023-12-05 DIAGNOSIS — M62.89 PELVIC FLOOR DYSFUNCTION: Primary | ICD-10-CM

## 2023-12-05 PROCEDURE — 97112 NEUROMUSCULAR REEDUCATION: CPT | Mod: PN

## 2023-12-05 NOTE — PATIENT INSTRUCTIONS
Kegels      Make sure you only squeeze the inside muscles, you blow out as you squeeze, and that you ensure relaxation in between squeezes.  1. Lie on your side, comfortably with legs and buttocks relaxed.  2. Blow out as you squeeze and LIFT the muscles that stop the flow of urine and passage of gas. Keep legs and buttock muscles quiet/relaxed.  3. Hold squeeze for 2-3 seconds, exhaling the entire time.  4. Release and DROP the pelvic floor muscles, making sure they relax fully and trying to feel them drop.  5. Repeat 10 times, 3 sets per day. Rest for 1 min between sets.    Cues for individual layers (you do not have to try to do them individually, this is just to help you)  - Layer 1: closing the openings  - Layer 2: pulling the urethra in toward the bladder like a drawstring  - Layer 3: pull the sit bones together

## 2023-12-05 NOTE — PROGRESS NOTES
[facility]   Physical Therapy Treatment Note      Name: Estefany Berumen  Clinic Number: [id number]    Therapy Diagnosis:   Encounter Diagnosis   Name Primary?    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   FOTO documents entered into EPIC - see Media section.     Limitation Score: PFDI Prolapse 21        Precautions: Standard     Time In: 9:08am  Time Out: 9:53am  Total Appointment Time (timed & untimed codes): 45 minutes    PTA Visit #: 1/5       Subjective     Patient reports: medication has been working. She has note had any major incidents of leakage besides a few drops when she has a hard cough/sneeze. She is having her consult for surgery in January.   She was compliant with home exercise program.  Response to previous treatment: no complaints  Functional change: nothing significant    Pain: 0/10   Location: vaginal canal    Objective      Objective Measures updated at progress report unless specified.     Treatment     Estefany received the treatments listed below:      therapeutic exercises to develop strength, endurance, ROM, flexibility, posture, and core stabilization for --- minutes including:  ---    Manual Therapy to improve flexibility and extensibility for 0 minutes including:      STM to BAHMAN adductors in butterfly pose      Therapeutic Activity to improve dynamic functional performance for 0 minutes including:     Reviewed  HEP  Education on double voiding techniques  Education on urge control  Pessary use  Pelvic floor benefits following surgery    neuromuscular re-education activities to improve: Coordination, Kinesthetic, Sense, Proprioception, and Posture for 45 minutes. The following activities were included:    Biofeedback sEMG:  - Kegels in Supine   - TA contractions in Supine  - 2w, 4r, 10 reps in supine   - 5w, 10r, 10 reps in supine  - Diaphragmatic breathing  - Kegels with cough  - Kegels in sidelying  - 2w, 4r, 10 reps in sidelying  - 5w, 10r, 10 reps in sidelying     Not today:  Clamshell x10 B  Diaphragmatic breathing with tactile and verbal cues for lateral rib excursion  +Cat cows x15  +child's pose  + open books x10 B  TA contractions supine and quadruped 2x10  + kegels  + bridges 2x10  + ball squeeze 2x10   + arm lifts in quadruped  + marching (not alternating)   + heel slides 2 x 5 (not alternating)  + bent knee fall out 2 x 5 (not alternating)  Posterior pelvic tilts x15  Kegels in hooklying       PATIENT EDUCATION AND HOME EXERCISES      Education provided:   general anatomy/physiology of urinary/ bowel  system, benefits of treatment, risks of treatment, and alternative methods of treatment were discussed with the patient. Additionally, anatomy/physiology of pelvic floor, bladder retraining, diaphragmatic breathing, double voiding techniques, isometric abdominal exercises, kegels, proper bearing down techniques, and fluid intake/dietary modifications were reviewed.      Written Home Exercises provided: yes.  Exercises were reviewed and Estefany was able to demonstrate them prior to the end of the session. Estefany demonstrated good  understanding of the education provided. See EMR under Patient Instructions for exercises provided during therapy sessions.    Assessment     Estefany tolerated therapy session well with no new complaints of pain. Patient reports improvements since initial evaluation with strength and  coordination of pelvic floor muscles. Introduced biofeedback sEMG to improve pelvic floor muscle contraction and relaxation. Noted increase in muscle activity with inhale while in supine. Was able to decrease use of muscle activity by patient lying in sidelying. Patient reporting feeling more relaxed in the sidelying position.  PT will contact patient to 2 weeks to receive report on symptoms. If patient is ready for discharge, we will discharge from pelvic floor PT with HEP. Continue progressing in POC as tolerated.     Estefany Is progressing well towards her goals.   Patient prognosis is Good.     Patient will continue to benefit from skilled outpatient physical therapy to address the deficits listed in the problem list box on initial evaluation, provide pt/family education and to maximize pt's level of independence in the home and community environment.     Patient's spiritual, cultural and educational needs considered and pt agreeable to plan of care and goals.     Anticipated barriers to physical therapy: none    Goals:  Short Term Goals: 5 weeks   - Pt will demonstrate excellent knowledge and adherence to HEP to facilitate optimal recovery. Progressing   - Pt will demonstrate proper PFM contraction, relaxation, and lengthening coordinated with TA and breath for improved muscle coordination needed for functional activity. Progressing   - Pt to show proper diaphragmatic breathing to promote relaxation and pelvic floor functional mobility and to help with calming the nervous system in order to decrease pain and improve QOL. Progressing   - Pt will report improved ability to initiate urine stream and completely empty without straining to demonstrate improved PF relaxation for proper voiding. Progressing   - Pt to report a decrease in pelvic pressure and fullness to no more than 50% of the time to demonstrate improving pelvic support of pelvic structures. Progressing   - Pt will report no incidence of urinary  incontinence 4/7 days for improved hygiene and ADL/IADL tolerance. Progressing      Long Term Goals: 10 weeks   - Pt will demonstrate excellent knowledge and adherence to HEP for continued self-maintenance of symptoms. Progressing   - Pt will report FOTO score on PFDI Prolapse of 10 limited or less indicating clinically relevant increase in function. Progressing   - Pt to demonstrate proper pressure management with all functional mobility including blowing out with exertion and activating pelvic floor + transverse abdominus to demonstrate improving pelvic organ support for improved ADL participation. Progressing   - Pt to report a decrease in pelvic pressure and fullness to no more than 25% of the time to demonstrate improving pelvic support of pelvic structures. Progressing   - Pt will report ability to delay urinary urge for at least 25 minutes to maintain continence with ADL/IADLs. Progressing   - Pt will report no incidence of urinary incontinence 6/7 days for improved hygiene and ADL/IADL tolerance. Progressing   - Pt will report needing </= 2 pad/day indicating improved PFM function needed to maintain continence. Progressing   - Pt will demonstrate PFM strength of at least 3/5 MMT for improved strength needed to maintain continence. Progressing   - Pt will report bearing down appropriately 100% of the time for improved bowel function and decreased stress on adjacent pelvic structures. Progressing   - Pt will report ability to tolerate speculum exam without pain for improved access to healthcare. Progressing      PLAN      Plan of care Certification: 10/17/2023 to 12/26/2023.     Outpatient Physical Therapy 1-2 time(s) every 1 week(s) for 10 weeks to include the following interventions: Electrical Stimulation TENS/IFC, Manual Therapy, Moist Heat/ Ice, Neuromuscular Re-ed, Patient Education, Self Care, Therapeutic Activities, Therapeutic Exercise, and FDN and ASTYM.       Cata Dominique, PT

## 2023-12-26 ENCOUNTER — DOCUMENTATION ONLY (OUTPATIENT)
Dept: REHABILITATION | Facility: HOSPITAL | Age: 48
End: 2023-12-26
Payer: COMMERCIAL

## 2023-12-26 NOTE — PROGRESS NOTES
Contacted patient about continuing PT. She would like to stop therapy at this time.    Cata Dominique, PT

## 2024-01-05 ENCOUNTER — TELEPHONE (OUTPATIENT)
Dept: OBSTETRICS AND GYNECOLOGY | Facility: CLINIC | Age: 49
End: 2024-01-05

## 2024-01-05 ENCOUNTER — OFFICE VISIT (OUTPATIENT)
Dept: OBSTETRICS AND GYNECOLOGY | Facility: CLINIC | Age: 49
End: 2024-01-05
Payer: COMMERCIAL

## 2024-01-05 VITALS
DIASTOLIC BLOOD PRESSURE: 90 MMHG | WEIGHT: 217.38 LBS | SYSTOLIC BLOOD PRESSURE: 136 MMHG | BODY MASS INDEX: 36.17 KG/M2

## 2024-01-05 DIAGNOSIS — N81.4 UTERINE PROLAPSE: Primary | ICD-10-CM

## 2024-01-05 PROBLEM — I10 HYPERTENSION: Status: ACTIVE | Noted: 2024-01-05

## 2024-01-05 PROBLEM — R20.2 NUMBNESS AND TINGLING OF BOTH UPPER EXTREMITIES: Status: RESOLVED | Noted: 2020-12-08 | Resolved: 2024-01-05

## 2024-01-05 PROBLEM — R20.0 NUMBNESS AND TINGLING OF BOTH UPPER EXTREMITIES: Status: RESOLVED | Noted: 2020-12-08 | Resolved: 2024-01-05

## 2024-01-05 PROCEDURE — 99999 PR PBB SHADOW E&M-EST. PATIENT-LVL III: CPT | Mod: PBBFAC,,, | Performed by: OBSTETRICS & GYNECOLOGY

## 2024-01-05 PROCEDURE — 3008F BODY MASS INDEX DOCD: CPT | Mod: CPTII,S$GLB,, | Performed by: OBSTETRICS & GYNECOLOGY

## 2024-01-05 PROCEDURE — 1159F MED LIST DOCD IN RCRD: CPT | Mod: CPTII,S$GLB,, | Performed by: OBSTETRICS & GYNECOLOGY

## 2024-01-05 PROCEDURE — 3080F DIAST BP >= 90 MM HG: CPT | Mod: CPTII,S$GLB,, | Performed by: OBSTETRICS & GYNECOLOGY

## 2024-01-05 PROCEDURE — 99213 OFFICE O/P EST LOW 20 MIN: CPT | Mod: S$GLB,,, | Performed by: OBSTETRICS & GYNECOLOGY

## 2024-01-05 PROCEDURE — 3075F SYST BP GE 130 - 139MM HG: CPT | Mod: CPTII,S$GLB,, | Performed by: OBSTETRICS & GYNECOLOGY

## 2024-01-05 PROCEDURE — 1160F RVW MEDS BY RX/DR IN RCRD: CPT | Mod: CPTII,S$GLB,, | Performed by: OBSTETRICS & GYNECOLOGY

## 2024-01-05 NOTE — PROGRESS NOTES
Subjective:       Patient ID: Estefany Berumen is a 48 y.o. female.    Chief Complaint:  Consult      History of Present Illness  HPI  Referral to discuss management of uterine prolapse with pelvic pressure symptoms and no evidence of vaginal wall prolapse   In PT for pelvic floor dysfunction   Reviewed notes from Dr Mejia   Discussed Pessary usage, Round Ligament suspension procedure and hysterectomy with Sacrospinous ligament support.   Reviewed usage, complications, risks and success of the options   Patient prefers hysterectomy approach     I have explained the risks, benefits , and alternatives of laparoscopic hysterectomy with ovarian conservation and salpingectomy  in detail.  The patient voices understanding and all questions have been answered.  The patient agrees to proceed as planned.  Consent forms for the procedure have been reviewed and signed by the patient.     Health Maintenance   Topic Date Due    TETANUS VACCINE  Never done    Colorectal Cancer Screening  Never done    Lipid Panel  2020    Mammogram  10/24/2024    Hepatitis C Screening  Completed     GYN & OB History  No LMP recorded. (Menstrual status: Birth Control).   Date of Last Pap: No result found    OB History    Para Term  AB Living   6 1 1   5 1   SAB IAB Ectopic Multiple Live Births   5       1      # Outcome Date GA Lbr Dev/2nd Weight Sex Delivery Anes PTL Lv   6 SAB            5 SAB            4 SAB            3 SAB            2 SAB            1 Term      Vag-Spont   GERMAN       Review of Systems  Review of Systems        Objective:   BP (!) 136/90   Wt 98.6 kg (217 lb 6 oz)   BMI 36.17 kg/m²    Physical Exam     Assessment:        1. Uterine prolapse                Plan:            Estefany was seen today for consult.    Diagnoses and all orders for this visit:    Uterine prolapse

## 2024-02-26 ENCOUNTER — OFFICE VISIT (OUTPATIENT)
Dept: OBSTETRICS AND GYNECOLOGY | Facility: CLINIC | Age: 49
End: 2024-02-26
Payer: COMMERCIAL

## 2024-02-26 VITALS
WEIGHT: 222.25 LBS | DIASTOLIC BLOOD PRESSURE: 72 MMHG | BODY MASS INDEX: 37.03 KG/M2 | SYSTOLIC BLOOD PRESSURE: 132 MMHG | HEIGHT: 65 IN

## 2024-02-26 DIAGNOSIS — N81.4 UTERINE PROLAPSE: Primary | ICD-10-CM

## 2024-02-26 PROCEDURE — 3008F BODY MASS INDEX DOCD: CPT | Mod: CPTII,S$GLB,, | Performed by: OBSTETRICS & GYNECOLOGY

## 2024-02-26 PROCEDURE — 1159F MED LIST DOCD IN RCRD: CPT | Mod: CPTII,S$GLB,, | Performed by: OBSTETRICS & GYNECOLOGY

## 2024-02-26 PROCEDURE — 3075F SYST BP GE 130 - 139MM HG: CPT | Mod: CPTII,S$GLB,, | Performed by: OBSTETRICS & GYNECOLOGY

## 2024-02-26 PROCEDURE — 99999 PR PBB SHADOW E&M-EST. PATIENT-LVL III: CPT | Mod: PBBFAC,,, | Performed by: OBSTETRICS & GYNECOLOGY

## 2024-02-26 PROCEDURE — 3078F DIAST BP <80 MM HG: CPT | Mod: CPTII,S$GLB,, | Performed by: OBSTETRICS & GYNECOLOGY

## 2024-02-26 PROCEDURE — 99213 OFFICE O/P EST LOW 20 MIN: CPT | Mod: S$GLB,,, | Performed by: OBSTETRICS & GYNECOLOGY

## 2024-02-26 NOTE — PROGRESS NOTES
Subjective:       Patient ID: Estefany Berumen is a 48 y.o. female.    Chief Complaint:  Pre-op Exam      History of Present Illness  HPI  Pt had met in consultation with Dr Bari Dwyer and myself for surgery consultation  Hx of uterine prolapse        GYN & OB History  No LMP recorded. (Menstrual status: Birth Control).   Date of Last Pap: No result found    OB History    Para Term  AB Living   6 1 1   5 1   SAB IAB Ectopic Multiple Live Births   5       1      # Outcome Date GA Lbr Dev/2nd Weight Sex Delivery Anes PTL Lv   6 SAB            5 SAB            4 SAB            3 SAB            2 SAB            1 Term      Vag-Spont   GERMAN       Review of Systems  Review of Systems   Genitourinary:  Positive for pelvic pain.   All other systems reviewed and are negative.          Objective:      Physical Exam:   Constitutional: She appears well-developed.     Eyes: Pupils are equal, round, and reactive to light. Conjunctivae and EOM are normal.      Pulmonary/Chest: Effort normal.        Abdominal: Soft.             Musculoskeletal: Normal range of motion.       Neurological: She is alert.    Skin: Skin is warm.    Psychiatric: She has a normal mood and affect.           Assessment:        1. Uterine prolapse               Plan:      Reviewed robotic assisted laparoscopic hysterectomy with bilateral salpingectomy procedure in detail; also reviewed round ligatment and vaginal cuff suspension (Dr Dwyer);     Reviewed risks including but not limited to infection, bleeding, damage to bowel/bladder, ureter, cva;htn, dvt, death.  Pt aware procedure will render her unable to have children.     All questions answered to the best of my ability.  Alternatives reviewed --continue pelvic floor PT, pessary.    Patient with concerns about sexuality and sensation of her partner after hysterectomy.  Patient also alarmed by the time frame of pelvic rest after surgery.   Patient reports she would not like to proceed  with hysterectomy but rather wants to be fitted with a pessary  Case request canceled

## 2024-03-04 ENCOUNTER — PROCEDURE VISIT (OUTPATIENT)
Dept: OBSTETRICS AND GYNECOLOGY | Facility: CLINIC | Age: 49
End: 2024-03-04
Payer: COMMERCIAL

## 2024-03-04 VITALS
SYSTOLIC BLOOD PRESSURE: 136 MMHG | BODY MASS INDEX: 36.31 KG/M2 | WEIGHT: 217.94 LBS | HEIGHT: 65 IN | DIASTOLIC BLOOD PRESSURE: 74 MMHG

## 2024-03-04 DIAGNOSIS — Z72.51 HIGH RISK HETEROSEXUAL BEHAVIOR: ICD-10-CM

## 2024-03-04 DIAGNOSIS — Z11.3 SCREENING EXAMINATION FOR STD (SEXUALLY TRANSMITTED DISEASE): ICD-10-CM

## 2024-03-04 DIAGNOSIS — N81.4 UTERINE PROLAPSE: Primary | ICD-10-CM

## 2024-03-04 PROCEDURE — A4562 PESSARY, NON RUBBER,ANY TYPE: HCPCS | Mod: S$GLB,,, | Performed by: OBSTETRICS & GYNECOLOGY

## 2024-03-04 PROCEDURE — 87491 CHLMYD TRACH DNA AMP PROBE: CPT | Performed by: OBSTETRICS & GYNECOLOGY

## 2024-03-04 PROCEDURE — 57160 INSERT PESSARY/OTHER DEVICE: CPT | Mod: S$GLB,,, | Performed by: OBSTETRICS & GYNECOLOGY

## 2024-03-04 PROCEDURE — 99499 UNLISTED E&M SERVICE: CPT | Mod: S$GLB,,, | Performed by: OBSTETRICS & GYNECOLOGY

## 2024-03-04 NOTE — PROCEDURES
Procedures  Estefany Berumen is a 48 y.o. female  presents for pessary fitting    PRE-PESSARY CHECK COUNSELING:  The patient was informed of the risks of pain or discomfort, continuous incontinence, urinary retention with insertion of a pessary and malodorous discharge and/or possible bleeding from granulation tissue after wearing the pessary for sometime. She was counseled on the alternatives to pessary insertion, including surgery or no treatment with continued symptoms, and she agrees to proceed.    PROCEDURE:  The distance between the posterior fornix and the pubic arch as well as the width of the vagina was measured bimanually. The vaginal canal width and length and level of prolapse was assessed. The patient was fitted with a # 2 gelhorn pessary  With a short knob    The patient was able to sit, stand, walk and either cough or urinate on the toilet after the procedure normally without expelling the pessary. The patient tolerated the procedure well.    ASSESSMENT:    Encounter Diagnoses   Name Primary?    Screening examination for STD (sexually transmitted disease)     High risk heterosexual behavior     Uterine prolapse Yes       POST PESSARY CHECK COUNSELING:  Report worsening urinary incontinency, urinary retention, pain, fever, foul smelling discharge or bleeding.  Importance of keeping follow-up appointments for a pessary check stressed.    Counseling lasted approximately 10 minutes and all her questions were answered.    FOLLOW-UP: In 2 wks for  pessary check as scheduled.    Also requesting std testing

## 2024-03-05 LAB
C TRACH DNA SPEC QL NAA+PROBE: NOT DETECTED
N GONORRHOEA DNA SPEC QL NAA+PROBE: NOT DETECTED

## 2024-03-18 ENCOUNTER — OFFICE VISIT (OUTPATIENT)
Dept: OBSTETRICS AND GYNECOLOGY | Facility: CLINIC | Age: 49
End: 2024-03-18
Payer: COMMERCIAL

## 2024-03-18 VITALS
SYSTOLIC BLOOD PRESSURE: 138 MMHG | DIASTOLIC BLOOD PRESSURE: 76 MMHG | HEIGHT: 65 IN | WEIGHT: 221.69 LBS | BODY MASS INDEX: 36.94 KG/M2

## 2024-03-18 DIAGNOSIS — M62.89 PELVIC FLOOR DYSFUNCTION: ICD-10-CM

## 2024-03-18 DIAGNOSIS — N81.4 UTERINE PROLAPSE: Primary | ICD-10-CM

## 2024-03-18 PROCEDURE — 3008F BODY MASS INDEX DOCD: CPT | Mod: CPTII,S$GLB,, | Performed by: OBSTETRICS & GYNECOLOGY

## 2024-03-18 PROCEDURE — 99999 PR PBB SHADOW E&M-EST. PATIENT-LVL III: CPT | Mod: PBBFAC,,, | Performed by: OBSTETRICS & GYNECOLOGY

## 2024-03-18 PROCEDURE — 1159F MED LIST DOCD IN RCRD: CPT | Mod: CPTII,S$GLB,, | Performed by: OBSTETRICS & GYNECOLOGY

## 2024-03-18 PROCEDURE — 99213 OFFICE O/P EST LOW 20 MIN: CPT | Mod: S$GLB,,, | Performed by: OBSTETRICS & GYNECOLOGY

## 2024-03-18 PROCEDURE — 3075F SYST BP GE 130 - 139MM HG: CPT | Mod: CPTII,S$GLB,, | Performed by: OBSTETRICS & GYNECOLOGY

## 2024-03-18 PROCEDURE — 3078F DIAST BP <80 MM HG: CPT | Mod: CPTII,S$GLB,, | Performed by: OBSTETRICS & GYNECOLOGY

## 2024-03-18 PROCEDURE — A4562 PESSARY, NON RUBBER,ANY TYPE: HCPCS | Mod: S$GLB,,, | Performed by: OBSTETRICS & GYNECOLOGY

## 2024-03-18 NOTE — PROGRESS NOTES
Subjective:       Patient ID: Estefany Berumen is a 48 y.o. female.    Chief Complaint:  Follow-up      History of Present Illness  Follow-up      Here for follow up  Reports gelhorn with knob (size 2)   With a lot of walking--=felt like it was shifting to the point of coming out    GYN & OB History  No LMP recorded. (Menstrual status: Birth Control).   Date of Last Pap: No result found    OB History    Para Term  AB Living   6 1 1   5 1   SAB IAB Ectopic Multiple Live Births   5       1      # Outcome Date GA Lbr Dev/2nd Weight Sex Delivery Anes PTL Lv   6 SAB            5 SAB            4 SAB            3 SAB            2 SAB            1 Term      Vag-Spont   GERMAN       Review of Systems  Review of Systems   All other systems reviewed and are negative.          Objective:      Physical Exam:   Constitutional: She appears well-developed.     Eyes: Pupils are equal, round, and reactive to light. Conjunctivae and EOM are normal.      Pulmonary/Chest: Effort normal. Right breast exhibits no mass, no nipple discharge, no skin change, no tenderness and presence. Left breast exhibits no mass, no nipple discharge, no skin change, no tenderness and presence. Breasts are symmetrical.        Abdominal: Soft.     Genitourinary:    Vagina and uterus normal.      Pelvic exam was performed with patient supine.             Musculoskeletal: Normal range of motion.       Neurological: She is alert.    Skin: Skin is warm.    Psychiatric: She has a normal mood and affect.           Assessment:        1. Uterine prolapse    2. Pelvic floor dysfunction               Plan:      Size 2 1/2 gelhorn placed  Pt tolerated insertion  Pt was able to sit, stand, valsalva  Denies vaginal bleeding  2024  Pt returned to the office--stating lower pelvis is too uncomfortable with pessary in.  Able to void but still feels discomfort  Pessary removed easily  Will order size  2 1/4 pessary--gelhorn--short knob

## 2024-05-24 ENCOUNTER — TELEPHONE (OUTPATIENT)
Dept: OBSTETRICS AND GYNECOLOGY | Facility: CLINIC | Age: 49
End: 2024-05-24
Payer: COMMERCIAL

## 2024-05-24 NOTE — TELEPHONE ENCOUNTER
----- Message from Dhara Murphy LPN sent at 3/18/2024  4:34 PM CDT -----  Order submitted   ----- Message -----  From: Tigist Mejia MD  Sent: 3/18/2024   1:55 PM CDT  To: Malachi Berumen MA; Dhara Murphy LPN    Please order size 2 1/4 gelhorn with short knob

## 2024-06-28 ENCOUNTER — OFFICE VISIT (OUTPATIENT)
Dept: OBSTETRICS AND GYNECOLOGY | Facility: CLINIC | Age: 49
End: 2024-06-28
Payer: COMMERCIAL

## 2024-06-28 VITALS
HEIGHT: 65 IN | BODY MASS INDEX: 35.57 KG/M2 | WEIGHT: 213.5 LBS | SYSTOLIC BLOOD PRESSURE: 130 MMHG | DIASTOLIC BLOOD PRESSURE: 86 MMHG

## 2024-06-28 DIAGNOSIS — N81.4 UTERINE PROLAPSE: Primary | ICD-10-CM

## 2024-06-28 PROCEDURE — 99999 PR PBB SHADOW E&M-EST. PATIENT-LVL III: CPT | Mod: PBBFAC,,, | Performed by: OBSTETRICS & GYNECOLOGY

## 2024-06-28 NOTE — PROGRESS NOTES
"History & Physical    Subjective     History of Present Illness:  Patient is a 48 y.o. female presents with uterine prolapse; Tried pessary but feels it is too uncomfortable and cumbersome.  . Interested in hysterectomy  Chief Complaint   Patient presents with    Surgical Consult       Review of patient's allergies indicates:   Allergen Reactions    Sulfa (sulfonamide antibiotics) Other (See Comments)    Sulfamethoxazole-trimethoprim     Trimethoprim Other (See Comments)       Current Outpatient Medications   Medication Sig Dispense Refill    carvediloL (COREG) 12.5 MG tablet       levonorgestreL (MIRENA) 21 mcg/24 hr (8 yrs) 52 mg IUD 1 each by Intrauterine route once.      olmesartan-hydrochlorothiazide (BENICAR HCT) 40-12.5 mg Tab Take 1 tablet by mouth once daily.      vibegron (GEMTESA) 75 mg Tab Take 75 mg by mouth Daily. (Patient not taking: Reported on 6/28/2024) 90 tablet 3     No current facility-administered medications for this visit.       Past Medical History:   Diagnosis Date    Hypertension      Past Surgical History:   Procedure Laterality Date    abdominalplasty      REDUCTION OF BOTH BREASTS Bilateral 2015    TOTAL REDUCTION MAMMOPLASTY Bilateral 2015     No family history on file.  Social History     Tobacco Use    Smoking status: Never    Smokeless tobacco: Never   Substance Use Topics    Alcohol use: Not Currently    Drug use: Never        Review of Systems:  Review of Systems   All other systems reviewed and are negative.         Objective     Vital Signs (Most Recent)  BP: 130/86 (06/28/24 0838)  5' 5" (1.651 m)  96.9 kg (213 lb 8.3 oz)     Physical Exam:  Physical Exam  Vitals reviewed.   Constitutional:       Appearance: She is well-developed.   HENT:      Head: Normocephalic.   Eyes:      Conjunctiva/sclera: Conjunctivae normal.      Pupils: Pupils are equal, round, and reactive to light.   Cardiovascular:      Rate and Rhythm: Normal rate.   Pulmonary:      Effort: Pulmonary effort is " normal.      Breath sounds: Normal breath sounds.   Abdominal:      Palpations: Abdomen is soft.   Musculoskeletal:         General: Normal range of motion.      Cervical back: Normal range of motion.   Skin:     General: Skin is warm and dry.   Neurological:      Mental Status: She is alert and oriented to person, place, and time.   Psychiatric:         Behavior: Behavior normal.         Thought Content: Thought content normal.         Judgment: Judgment normal.         Laboratory  Cbc, bmp, EKG preop    Diagnostic Results:  N/a       Assessment and Plan   Encounter Diagnosis   Name Primary?    Uterine prolapse Yes         PLAN:Please send neg pap letter  Reviewed robotic assisted laparoscopic hysterectomy with bilateral salpingectomy procedure in detail.  Patient wishes to keep ovaries.   .   Reviewed risks including but not limited to infection, bleeding, damage to bowel/bladder, ureter, cva;htn, dvt, death.  .     All questions answered to the best of my ability.  Alternatives reviewed --observation, use of pessary.  Patient aware will try to suspect vagina to round ligaments or sslf; .  Consents signed and witnessed.  Case request submitted   Post op course reviewed (no lfiting, straining for 6 wks); pelvic rest x12 wks

## 2024-07-02 ENCOUNTER — TELEPHONE (OUTPATIENT)
Dept: OBSTETRICS AND GYNECOLOGY | Facility: CLINIC | Age: 49
End: 2024-07-02
Payer: COMMERCIAL

## 2024-07-02 NOTE — TELEPHONE ENCOUNTER
Called patient and advised surgery scheduler is out for a few days and she should be hearing from someone soon.  Patient verbalized understanding.

## 2024-07-02 NOTE — TELEPHONE ENCOUNTER
----- Message from Neisha Malone sent at 7/2/2024  1:44 PM CDT -----  Regarding: surgery concerns  Name of who is calling:   Estefany      What is the request in detail: Pt is requesting a call back in ref to scheduling surgery      Can the clinic reply by [facility]:yes      What number to call back if not [facility]: [phone]

## 2024-07-08 ENCOUNTER — TELEPHONE (OUTPATIENT)
Dept: OBSTETRICS AND GYNECOLOGY | Facility: CLINIC | Age: 49
End: 2024-07-08
Payer: COMMERCIAL

## 2024-07-08 DIAGNOSIS — N81.4 UTERINE PROLAPSE: Primary | ICD-10-CM

## 2024-07-08 NOTE — TELEPHONE ENCOUNTER
----- Message from Tigist Mejia MD sent at 6/28/2024  8:50 AM CDT -----  Joseluiss katie leach for uterine prolapse    Consents signed today    Needs EKG with her labs

## 2024-07-09 ENCOUNTER — PATIENT MESSAGE (OUTPATIENT)
Dept: OBSTETRICS AND GYNECOLOGY | Facility: CLINIC | Age: 49
End: 2024-07-09
Payer: COMMERCIAL

## 2024-07-11 ENCOUNTER — TELEPHONE (OUTPATIENT)
Dept: OBSTETRICS AND GYNECOLOGY | Facility: CLINIC | Age: 49
End: 2024-07-11
Payer: COMMERCIAL

## 2024-07-11 NOTE — TELEPHONE ENCOUNTER
Pt called to reschedule surgery to later date. Message sent to Surgery Scheduling box    Felicita BANEGAS LPN  OB/GYN

## 2024-07-15 ENCOUNTER — TELEPHONE (OUTPATIENT)
Dept: OBSTETRICS AND GYNECOLOGY | Facility: CLINIC | Age: 49
End: 2024-07-15
Payer: COMMERCIAL

## 2024-07-15 NOTE — TELEPHONE ENCOUNTER
----- Message from Madhavi Mejia LPN sent at 7/11/2024  4:07 PM CDT -----  Contact: patient  Good Afternoon,     Pt called to reschedule surgery to later date       Felicita  ----- Message -----  From: Herb Grimm  Sent: 7/11/2024   3:48 PM CDT  To: [facility] Staff    Estefany Berumen would like a call back at [phone], in regards to rescheduling her upcoming procedure.

## 2024-08-09 ENCOUNTER — HOSPITAL ENCOUNTER (OUTPATIENT)
Dept: CARDIOLOGY | Facility: HOSPITAL | Age: 49
Discharge: HOME | End: 2024-08-09
Attending: OBSTETRICS & GYNECOLOGY
Payer: COMMERCIAL

## 2024-08-09 ENCOUNTER — OFFICE VISIT (OUTPATIENT)
Dept: OBSTETRICS AND GYNECOLOGY | Facility: CLINIC | Age: 49
End: 2024-08-09
Payer: COMMERCIAL

## 2024-08-09 ENCOUNTER — PATIENT MESSAGE (OUTPATIENT)
Dept: OBSTETRICS AND GYNECOLOGY | Facility: CLINIC | Age: 49
End: 2024-08-09

## 2024-08-09 VITALS
DIASTOLIC BLOOD PRESSURE: 82 MMHG | SYSTOLIC BLOOD PRESSURE: 140 MMHG | BODY MASS INDEX: 36.1 KG/M2 | HEIGHT: 65 IN | WEIGHT: 216.69 LBS

## 2024-08-09 DIAGNOSIS — N81.4 UTERINE PROLAPSE: ICD-10-CM

## 2024-08-09 DIAGNOSIS — N81.4 UTERINE PROLAPSE: Primary | ICD-10-CM

## 2024-08-09 PROCEDURE — 99999 PR PBB SHADOW E&M-EST. PATIENT-LVL III: CPT | Mod: PBBFAC,,, | Performed by: OBSTETRICS & GYNECOLOGY

## 2024-08-09 PROCEDURE — 93010 ELECTROCARDIOGRAM REPORT: CPT | Mod: ,,, | Performed by: STUDENT IN AN ORGANIZED HEALTH CARE EDUCATION/TRAINING PROGRAM

## 2024-08-09 PROCEDURE — 93005 ELECTROCARDIOGRAM TRACING: CPT

## 2024-08-09 RX ORDER — MEPERIDINE HYDROCHLORIDE 25 MG/ML
12.5 INJECTION INTRAMUSCULAR; INTRAVENOUS; SUBCUTANEOUS ONCE AS NEEDED
OUTPATIENT
Start: 2024-08-09 | End: 2024-08-10

## 2024-08-09 RX ORDER — MUPIROCIN 20 MG/G
OINTMENT TOPICAL
OUTPATIENT
Start: 2024-08-09

## 2024-08-09 RX ORDER — AMOXICILLIN 250 MG
1 CAPSULE ORAL 2 TIMES DAILY
OUTPATIENT
Start: 2024-08-09

## 2024-08-09 RX ORDER — KETOROLAC TROMETHAMINE 30 MG/ML
30 INJECTION, SOLUTION INTRAMUSCULAR; INTRAVENOUS
OUTPATIENT
Start: 2024-08-09 | End: 2024-08-10

## 2024-08-09 RX ORDER — ACETAMINOPHEN 325 MG/1
650 TABLET ORAL EVERY 4 HOURS PRN
OUTPATIENT
Start: 2024-08-09

## 2024-08-09 RX ORDER — HYDROCODONE BITARTRATE AND ACETAMINOPHEN 10; 325 MG/1; MG/1
1 TABLET ORAL EVERY 4 HOURS PRN
OUTPATIENT
Start: 2024-08-09

## 2024-08-09 RX ORDER — CELECOXIB 100 MG/1
400 CAPSULE ORAL
OUTPATIENT
Start: 2024-08-09

## 2024-08-09 RX ORDER — ACETAMINOPHEN 500 MG
1000 TABLET ORAL
OUTPATIENT
Start: 2024-08-09

## 2024-08-09 RX ORDER — PROCHLORPERAZINE EDISYLATE 5 MG/ML
5 INJECTION INTRAMUSCULAR; INTRAVENOUS EVERY 6 HOURS PRN
OUTPATIENT
Start: 2024-08-09

## 2024-08-09 RX ORDER — SODIUM CHLORIDE, SODIUM LACTATE, POTASSIUM CHLORIDE, CALCIUM CHLORIDE 600; 310; 30; 20 MG/100ML; MG/100ML; MG/100ML; MG/100ML
INJECTION, SOLUTION INTRAVENOUS CONTINUOUS
OUTPATIENT
Start: 2024-08-09

## 2024-08-09 RX ORDER — HYDROMORPHONE HYDROCHLORIDE 1 MG/ML
0.2 INJECTION, SOLUTION INTRAMUSCULAR; INTRAVENOUS; SUBCUTANEOUS EVERY 5 MIN PRN
OUTPATIENT
Start: 2024-08-09

## 2024-08-09 RX ORDER — IBUPROFEN 800 MG/1
800 TABLET ORAL
OUTPATIENT
Start: 2024-08-10

## 2024-08-09 RX ORDER — HYDROMORPHONE HYDROCHLORIDE 1 MG/ML
1 INJECTION, SOLUTION INTRAMUSCULAR; INTRAVENOUS; SUBCUTANEOUS EVERY 4 HOURS PRN
OUTPATIENT
Start: 2024-08-09

## 2024-08-09 RX ORDER — FAMOTIDINE 20 MG/1
20 TABLET, FILM COATED ORAL
OUTPATIENT
Start: 2024-08-09

## 2024-08-09 RX ORDER — LIDOCAINE HYDROCHLORIDE 10 MG/ML
0.5 INJECTION, SOLUTION EPIDURAL; INFILTRATION; INTRACAUDAL; PERINEURAL
OUTPATIENT
Start: 2024-08-09

## 2024-08-09 RX ORDER — ONDANSETRON HYDROCHLORIDE 2 MG/ML
4 INJECTION, SOLUTION INTRAVENOUS DAILY PRN
OUTPATIENT
Start: 2024-08-09

## 2024-08-09 RX ORDER — CEFAZOLIN SODIUM 2 G/50ML
2 SOLUTION INTRAVENOUS
OUTPATIENT
Start: 2024-08-09

## 2024-08-09 RX ORDER — DIPHENHYDRAMINE HCL 25 MG
25 CAPSULE ORAL EVERY 4 HOURS PRN
OUTPATIENT
Start: 2024-08-09

## 2024-08-09 RX ORDER — HYDROCODONE BITARTRATE AND ACETAMINOPHEN 5; 325 MG/1; MG/1
1 TABLET ORAL EVERY 4 HOURS PRN
OUTPATIENT
Start: 2024-08-09

## 2024-08-09 RX ORDER — SODIUM CHLORIDE 0.9 % (FLUSH) 0.9 %
3 SYRINGE (ML) INJECTION
OUTPATIENT
Start: 2024-08-09

## 2024-08-09 RX ORDER — DIPHENHYDRAMINE HYDROCHLORIDE 50 MG/ML
25 INJECTION INTRAMUSCULAR; INTRAVENOUS EVERY 4 HOURS PRN
OUTPATIENT
Start: 2024-08-09

## 2024-08-09 RX ORDER — PREGABALIN 25 MG/1
50 CAPSULE ORAL
OUTPATIENT
Start: 2024-08-09

## 2024-08-09 RX ORDER — ONDANSETRON 8 MG/1
8 TABLET, ORALLY DISINTEGRATING ORAL EVERY 8 HOURS PRN
OUTPATIENT
Start: 2024-08-09

## 2024-08-09 RX ORDER — PROCHLORPERAZINE EDISYLATE 5 MG/ML
5 INJECTION INTRAMUSCULAR; INTRAVENOUS EVERY 10 MIN PRN
OUTPATIENT
Start: 2024-08-09

## 2024-08-09 RX ORDER — POLYETHYLENE GLYCOL 3350 17 G/17G
17 POWDER, FOR SOLUTION ORAL DAILY
OUTPATIENT
Start: 2024-08-09

## 2024-08-09 NOTE — H&P (VIEW-ONLY)
Subjective     Patient ID: Estefany Berumen is a 48 y.o. female.    Chief Complaint:  Pre-op Exam      History of Present Illness  HPI  Pelvic pressure from uterine prolapse that is not improved with pessary for hysterectomy     GYN & OB History  Patient's last menstrual period was 2024.   Date of Last Pap: 2022    OB History    Para Term  AB Living   6 1 1   5 1   SAB IAB Ectopic Multiple Live Births   5       1      # Outcome Date GA Lbr Dev/2nd Weight Sex Type Anes PTL Lv   6 SAB            5 SAB            4 SAB            3 SAB            2 SAB            1 Term      Vag-Spont   GERMAN     Past Medical History:   Diagnosis Date    Hypertension      Past Surgical History:   Procedure Laterality Date    abdominalplasty      LIPOSUCTION  2024    back    REDUCTION OF BOTH BREASTS Bilateral     TOTAL REDUCTION MAMMOPLASTY Bilateral     tummy tuck  2024     No family history on file.  Social History     Tobacco Use    Smoking status: Never    Smokeless tobacco: Never   Substance Use Topics    Alcohol use: Not Currently    Drug use: Never     (Not in a hospital admission)    Review of patient's allergies indicates:   Allergen Reactions    Sulfa (sulfonamide antibiotics) Other (See Comments)    Sulfamethoxazole-trimethoprim     Trimethoprim Other (See Comments)     Current Outpatient Medications   Medication Sig    carvediloL (COREG) 12.5 MG tablet     levonorgestreL (MIRENA) 21 mcg/24 hr (8 yrs) 52 mg IUD 1 each by Intrauterine route once.    olmesartan-hydrochlorothiazide (BENICAR HCT) 40-12.5 mg Tab Take 1 tablet by mouth once daily.    vibegron (GEMTESA) 75 mg Tab Take 75 mg by mouth Daily. (Patient not taking: Reported on 2024)     No current facility-administered medications for this visit.       Review of Systems  Review of Systems   Constitutional:  Negative for appetite change, chills, fatigue, fever and unexpected weight change.   HENT: Negative.     Eyes:  Negative  for visual disturbance.   Respiratory:  Negative for shortness of breath and wheezing.    Cardiovascular:  Negative for chest pain, palpitations and leg swelling.   Gastrointestinal:  Negative for abdominal pain, bloating, blood in stool, constipation, diarrhea, nausea and vomiting.   Endocrine: Negative for hair loss, hot flashes and hypothyroidism.   Genitourinary:  Positive for vaginal pain. Negative for bladder incontinence, dysmenorrhea, dyspareunia, dysuria, flank pain, frequency, genital sores, hematuria, menorrhagia, menstrual problem, pelvic pain, urgency, vaginal bleeding, vaginal discharge, urinary incontinence and vaginal odor.   Musculoskeletal:  Negative for back pain, joint swelling and myalgias.   Integumentary:  Negative for rash, hair changes, breast mass, nipple discharge and breast skin changes.   Neurological:  Negative for syncope and headaches.   Hematological:  Negative for adenopathy. Does not bruise/bleed easily.   Psychiatric/Behavioral:  Negative for depression and sleep disturbance. The patient is not nervous/anxious.    Breast: Negative for mass, mastodynia, nipple discharge and skin changes         Objective   Physical Exam:   Constitutional: She is oriented to person, place, and time. Vital signs are normal. She appears well-developed and well-nourished. No distress.    HENT:   Head: Normocephalic and atraumatic.   Right Ear: External ear normal.   Left Ear: External ear normal.   Nose: Nose normal.    Eyes: Pupils are equal, round, and reactive to light. Conjunctivae are normal.    Neck: Trachea normal. No JVD present. No thyroid mass and no thyromegaly present.    Cardiovascular:  Normal rate and regular rhythm.             Pulmonary/Chest: Effort normal and breath sounds normal. No respiratory distress.        Abdominal: Soft. Bowel sounds are normal. She exhibits no distension and no mass. There is no abdominal tenderness. No hernia. Hernia confirmed negative in the ventral area,  confirmed negative in the right inguinal area and confirmed negative in the left inguinal area.     Genitourinary:    Vagina, uterus and rectum normal.   Rectum:      No external hemorrhoid or abnormal anal tone.     Labial bartholins normal.There is no rash, tenderness or lesion on the right labia. There is no rash, tenderness or lesion on the left labia. Cervix is normal. Right adnexum displays no mass, no tenderness and no fullness. Left adnexum displays no mass, no tenderness and no fullness. No vaginal discharge, tenderness, bleeding, rectocele, cystocele or prolapse of vaginal walls in the vagina.    No foreign body in the vagina.   Cervix exhibits no motion tenderness, no discharge and no friability. Uterus is uterine prolapse (grade 3). Uterus is not deviated, not enlarged and not tender.           Musculoskeletal: Normal range of motion.       Neurological: She is alert and oriented to person, place, and time. She has normal reflexes.    Skin: Skin is warm and dry. She is not diaphoretic.    Psychiatric: She has a normal mood and affect. Her speech is normal and behavior is normal. Thought content normal.            Assessment and Plan     1. Uterine prolapse             Plan:  Estefany was seen today for pre-op exam.    Diagnoses and all orders for this visit:    Uterine prolapse  Comments:  Robotic Hysterectomy bso with uterosacral fixation

## 2024-08-10 LAB
OHS QRS DURATION: 84 MS
OHS QTC CALCULATION: 420 MS

## 2024-08-16 ENCOUNTER — TELEPHONE (OUTPATIENT)
Dept: OBSTETRICS AND GYNECOLOGY | Facility: CLINIC | Age: 49
End: 2024-08-16
Payer: COMMERCIAL

## 2024-08-16 NOTE — TELEPHONE ENCOUNTER
----- Message from Zaida Stauffer sent at 8/16/2024  8:17 AM CDT -----  Contact: GAUTAM BORJAS [[id number]]  ..Type:  Patient Requesting Call    Who Called: GAUTAM BORJAS [[id number]]  Does the patient know what this is regarding?: JESSICA paperwork, question 4 on page 3 needs to be answered   Would the patient rather a call back or a response via [facility]?  call  Best Call Back Number: .[phone]  Additional Information: Pt also wants nurse to be mindful of HIPAA when filling out answer

## 2024-08-16 NOTE — TELEPHONE ENCOUNTER
Pt Called in regards to question 4 on page 3 needing to be answered. Pt also request  to be mindful of HIPAA when answering Questions. Message sent to Nurse to Address       Felicita BANEGAS LPN  OB/GYN

## 2024-08-19 ENCOUNTER — HOSPITAL ENCOUNTER (OUTPATIENT)
Dept: RADIOLOGY | Facility: HOSPITAL | Age: 49
Discharge: HOME | End: 2024-08-19
Attending: NURSE PRACTITIONER
Payer: COMMERCIAL

## 2024-08-19 ENCOUNTER — OFFICE VISIT (OUTPATIENT)
Dept: INTERNAL MEDICINE | Facility: CLINIC | Age: 49
End: 2024-08-19
Payer: COMMERCIAL

## 2024-08-19 VITALS
DIASTOLIC BLOOD PRESSURE: 92 MMHG | HEART RATE: 90 BPM | SYSTOLIC BLOOD PRESSURE: 154 MMHG | RESPIRATION RATE: 18 BRPM | OXYGEN SATURATION: 98 % | TEMPERATURE: 98 F

## 2024-08-19 DIAGNOSIS — I10 HYPERTENSION, UNSPECIFIED TYPE: ICD-10-CM

## 2024-08-19 DIAGNOSIS — N81.4 UTERINE PROLAPSE: ICD-10-CM

## 2024-08-19 DIAGNOSIS — Z01.818 PREOP EXAMINATION: ICD-10-CM

## 2024-08-19 DIAGNOSIS — Z01.818 PREOP TESTING: ICD-10-CM

## 2024-08-19 DIAGNOSIS — Z01.818 PREOP TESTING: Primary | ICD-10-CM

## 2024-08-19 PROCEDURE — 71045 X-RAY EXAM CHEST 1 VIEW: CPT | Mod: TC

## 2024-08-19 PROCEDURE — 99999 PR PBB SHADOW E&M-EST. PATIENT-LVL III: CPT | Mod: PBBFAC,,,

## 2024-08-19 PROCEDURE — 71045 X-RAY EXAM CHEST 1 VIEW: CPT | Mod: 26,,, | Performed by: RADIOLOGY

## 2024-08-19 NOTE — DISCHARGE INSTRUCTIONS
Pre op instructions reviewed with patient face to face during Clinic Visit with Provider, verbalized understanding.    To confirm, Surgery is scheduled on 8/22/24. We will call you after 2pm the day before Surgery with your arrival time.    *Please report to the [facility] (1st Floor) located off of [facility] (2nd Entrance/Building on the left, in front of the flag pole).  Address: [address] [place] [address] [place]. [address]      !!!INSTRUCTIONS IMPORTANT!!!  DO NOT Eat, Drink, or Smoke after 12 midnight unless instructed otherwise by your Surgeon. OK to brush teeth, no gum, candy or mints!    MORNING OF SURGERY, drink small sip of water with the following medications instructed by Surgery Pre-Admit Provider:  Carvedilol     !!!STOP ALL Aspirins, NSAIDS, WEIGHT LOSS INJECTIONS/PILLS, Herbal supplements, & Vitamins 7 DAYS BEFORE SURGERY!!!    ____  Avoid Alcoholic beverages 3 days prior to surgery, as it can thin the blood.  ____  NO Acrylic/fake nails or nail polish worn day of surgery (specifically hand/arm & foot surgeries).  ____  NO powder, lotions, deodorants, oils or cream on body.  ____  Remove all jewelry, piercings, & foreign objects prior to arrival and leave at home.  ____  Remove Dentures, Hearing Aids & Contact Lens prior to surgery.  ____  Bring photo ID and insurance information to hospital (Leave Valuables at Home).  ____  If going home the same day, arrange for a ride home. You will not be able to drive for 24 hrs if Anesthesia was used.   ____  Females (ages 11-60): may need to give a urine sample the morning of surgery; please see Pre op Nurse prior to using the restroom.  ____  Males: Stop ED medications (Viagra, Cialis) 24 hrs prior to surgery.  ____  Wear clean, loose fitting clothing to allow for dressings/ bandages.      Bathing Instructions:    -Shower with anti-bacterial Soap (Hibiclens or Dial) the night before surgery & the morning of surgery!   -Do not use Hibiclens  soap on your face or genitals.   -Apply clean clothes after shower.  -Do not shave your face or body 2 days prior to surgery unless instructed otherwise by your Surgeon.  -Do not shave pubic hair 7 days prior to surgery (gyn pt's).    [facility] Vis[facility]/Ride Policy:  Only 2 adults allowed in pre op/recovery area during your procedure. You MUST HAVE A RIDE HOME from a responsible adult that you know and trust. Medical Transport, Uber or Lyft can ONLY be used if patient has a responsible adult to accompany them during ride home.       *Signs and symptoms of Infection Before or After Surgery:               !!!If you experience any fever, chills, nausea/ vomiting, foul odor/ excessive drainage from surgical site, flu-like symptoms, new wounds or cuts, PLEASE CALL THE SURGEON OFFICE at [phone] or SEND MESSAGE THROUGH [employer]!!!       *If you are running late day of surgery, please call the Surgery Dept @ [phone].    *Billing question, please call:  [phone] or [phone]       Thank you,  -[facility] Pre Ad[facility] Dept.  [phone] or [phone]  M-F 7:30 am-4:00 pm (Closed Major Holidays)    Additional Tests Scheduled Today:  urine (1st Floor) Check in at the !

## 2024-08-19 NOTE — PROGRESS NOTES
Preoperative History and Physical                                                              Hospital Medicine                                                              Chief Complaint: Preoperative evaluation     History of Present Illness:      Estefany Berumen is a 48 y.o. female who presents to the office today for a preoperative consultation at the request of Dr. Dwyer who plans on performing Robotic hysterectomy with salpingectomy on August 22.     Functional Status:      The patient is able to climb a flight of stairs. The patient is able to ambulate independently without difficulty. The patient's functional status is affected by the surgical problem. The patient's functional status is not affected by shortness of breath, chest pain, dyspnea on exertion and fatigue.    MET score greater than 4    Past Medical History:      Past Medical History:   Diagnosis Date    Hypertension     Myocardial infarction         Past Surgical History:      Past Surgical History:   Procedure Laterality Date    abdominalplasty      LIPOSUCTION  05/28/2024    back    REDUCTION OF BOTH BREASTS Bilateral 2015    TOTAL REDUCTION MAMMOPLASTY Bilateral 2015    tummy tuck  04/24/2024        Social History:      Social History     Socioeconomic History    Marital status:    Tobacco Use    Smoking status: Never    Smokeless tobacco: Never   Substance and Sexual Activity    Alcohol use: Not Currently    Drug use: Never    Sexual activity: Yes     Partners: Male     Birth control/protection: Implant     Social Determinants of Health     Financial Resource Strain: Patient Declined (8/16/2024)    Overall Financial Resource Strain (CARDIA)     Difficulty of Paying Living Expenses: Patient declined   Food Insecurity: Patient Declined (8/16/2024)    Hunger Vital Sign     Worried About Running Out of Food in the Last Year: Patient declined     Ran Out of Food in the Last Year: Patient  declined   Physical Activity: Patient Declined (8/16/2024)    Exercise Vital Sign     Days of Exercise per Week: Patient declined     Minutes of Exercise per Session: Patient declined   Stress: Patient Declined (8/16/2024)    [employer] - Occupational Stress Questionnaire     Feeling of Stress : Patient declined   Housing Stability: Unknown (8/16/2024)    Housing Stability Vital Sign     Unable to Pay for Housing in the Last Year: Patient declined        Family History:      Family History   Problem Relation Name Age of Onset    Hypertension Sister      Hypertension Brother         Allergies:      Review of patient's allergies indicates:   Allergen Reactions    Sulfa (sulfonamide antibiotics) Other (See Comments)    Sulfamethoxazole-trimethoprim     Trimethoprim Other (See Comments)       Medications:      Current Outpatient Medications   Medication Sig    carvediloL (COREG) 12.5 MG tablet     levonorgestreL (MIRENA) 21 mcg/24 hr (8 yrs) 52 mg IUD 1 each by Intrauterine route once.    olmesartan-hydrochlorothiazide (BENICAR HCT) 40-12.5 mg Tab Take 1 tablet by mouth once daily.     No current facility-administered medications for this visit.       Vitals:      Vitals:    08/19/24 0920   BP: (!) 154/92   Pulse: 90   Resp: 18   Temp: 98.1 °F (36.7 °C)       Review of Systems:        Constitutional: Negative for fever, chills, weight loss, malaise/fatigue and diaphoresis.   HENT: Negative for hearing loss, ear pain, nosebleeds, congestion, sore throat, neck pain, tinnitus and ear discharge.    Eyes: Negative for blurred vision, double vision, photophobia, pain, discharge and redness.   Respiratory: Negative for cough, hemoptysis, sputum production, shortness of breath, wheezing and stridor.    Cardiovascular: Negative for chest pain, palpitations, orthopnea, claudication, leg swelling and PND.   Gastrointestinal: Negative for heartburn, nausea, vomiting, abdominal pain, diarrhea,  "constipation, blood in stool and melena.   Genitourinary: Negative for dysuria, urgency, frequency, hematuria and flank pain.   Musculoskeletal: Negative for myalgias, back pain, joint pain and falls.   Skin: Negative for itching and rash.   Neurological: Negative for dizziness, tingling, tremors, sensory change, speech change, focal weakness, seizures, loss of consciousness, weakness and headaches.   Endo/Heme/Allergies: Negative for environmental allergies and polydipsia. Does not bruise/bleed easily.   Psychiatric/Behavioral: Negative for depression, suicidal ideas, hallucinations, memory loss and substance abuse. The patient is not nervous/anxious and does not have insomnia.    All 14 systems reviewed and negative except as noted above.    Physical Exam:      Constitutional: Appears well-developed, well-nourished and in no acute distress.  Patient is oriented to person, place, and time.   Head: Normocephalic and atraumatic. Mucous membranes moist.  Neck: Neck supple no mass.   Cardiovascular: Normal rate and regular rhythm.  S1 S2 appreciated by ascultation.  Pulmonary/Chest: Effort normal and clear to auscultation bilaterally. No respiratory distress.   Abdomen: Soft. Non-tender and non-distended. Bowel sounds are normal.   Neurological: Patient is alert and oriented to person, place and time. Moves all extremities.  Skin: Warm and dry. No lesions.  Extremities: No clubbing, cyanosis or edema.    Laboratory data:      Reviewed and noted in plan where applicable. Please see chart for full laboratory data.    Lab Results   Component Value Date     08/09/2024    K 3.9 08/09/2024     08/09/2024    CO2 23 08/09/2024    CALCIUM 9.4 08/09/2024    BUN 10 08/09/2024    CREATININE 0.8 08/09/2024     08/09/2024        No results found for: "INR", "PROTIME"    Lab Results   Component Value Date    WBC 5.74 08/09/2024    HGB 12.4 08/09/2024    HCT 40.3 08/09/2024    MCV 88 08/09/2024     (H) " 08/09/2024     Urinalysis  Specimen UA    Color, UA Yellow, Straw, [place]   Appearance, UA Clear   pH, UA 5.0 - 8.0   Specific Gravity, UA 1.005 - 1.030   Protein, UA Negative      Glucose, UA Negative   Ketones, UA Negative   Bilirubin (UA) Negative   Occult Blood UA Negative   Nitrite, UA Negative   Leukocytes, UA Negative   Resulting Agency        Predictors of intubation difficulty:       Morbid obesity? no   Anatomically abnormal facies? no   Prominent incisors? no   Receding mandible? no   Short, thick neck? no   Neck range of motion: normal   Dentition: Missing teeth (top molars x2)  Based on the Modified Mallampati, patient is a mallampati score: II (hard and soft palate, upper portion of tonsils anduvula visible)    Cardiographics:      ECG:   ent. Rate : 083 BPM     Atrial Rate : 083 BPM      P-R Int : 180 ms          QRS Dur : 084 ms       QT Int : 358 ms       P-R-T Axes : 056 014 018 degrees      QTc Int : 420 ms     Normal sinus rhythm   Nonspecific ST abnormality   Abnormal ECG   No previous ECGs available   Confirmed by Anayeli Isaacs MD ([id number]) on 8/10/2024 10:52:42 AM     Echocardiogram: not indicated    Imaging:      Chest x-ray:   CLINICAL HISTORY: [Z01.818]-Encounter for other preprocedural examination.     COMPARISON:  None available.     FINDINGS:  Heart size is normal and lungs are clear bilaterally.  Pulmonary vasculature is normal.     IMPRESSION:  No evidence of acute disease.     Finalized on: 8/19/2024 12:42 PM By:  Dhruv Cosby  [facility]# [id number]      2024-08-19 12:45:03.636    [facility]  Assessment and Plan:      Uterine prolapse  Robotic Hysterectomy with Salpingectomy on 8/22/24 by Dr. Dwyer.     Known risk factors for perioperative complications: None    Difficulty with intubation is not anticipated.    Cardiac Risk Estimation: Based on the Revised Cardiac I Risk index, patient is a Class risk with a 3.9% risk of a major cardiac event in a low risk procedure.    1.) Preoperative  workup as follows: chest x-ray, ECG, hemoglobin, hematocrit, electrolytes, creatinine, urinalysis (urinary tract instrumentation planned).  2.) Change in medication regimen before surgery: discontinue ASA 6 days before surgery, discontinue NSAIDs () 5 days before surgery, hold Metformin 24 hours prior to surgery.  3.) Prophylaxis for cardiac events with perioperative beta-blockers: carvedilol.  4.) Invasive hemodynamic monitoring perioperatively: at the discretion of anesthesiologist.  5.) Deep vein thrombosis prophylaxis postoperatively: regimen to be chosen by surgical team.  6.) Surveillance for postoperative MI with ECG immediately postoperatively and on postoperati ve days 1 and 2 AND troponin levels 24 hours postoperatively and on day 4 or hospital discharge (whichever comes first): at the discretion of anesthesiologist.  7.) Current medications which may produce withdrawal symptoms if withheld perioperatively: N/A  8.) Other measures: Postoperative incentive spirometry to prevent pneumonia.     --Morning of Procedure medications: Carvedilol  --Hold all other medications morning of surgery: Benicar/HCT  --Resume all medications post-operatively   --Hold Vitamins/supplements 7 days prior to procedure  UA reviewed. No indication of infection    Per Dr. Dwyer: Post op course reviewed (no lfiting, straining for 6 wks); pelvic rest x12 wks      Hypertension  --Elevated on today's exam (154/92). Did not take medications this morning. Will take later today.   She will continue carvedilol on morning of surgery, she was asked to hold Benicar/HCT      Electronically signed by CELESTE Lima on 8/19/2024 at 9:36 AM.     Time spent seeing patient (greater than 1/2 spent in direct contact)  >45 minutes

## 2024-08-19 NOTE — PRE-PROCEDURE INSTRUCTIONS
Pre op instructions reviewed with patient face to face during Clinic Visit with Provider, verbalized understanding.    To confirm, Surgery is scheduled on 8/22/24. We will call you after 2pm the day before Surgery with your arrival time.    *Please report to the [facility] (1st Floor) located off of [facility] (2nd Entrance/Building on the left, in front of the flag pole).  Address: [address] [place] [address] [place]. [address]      !!!INSTRUCTIONS IMPORTANT!!!  DO NOT Eat, Drink, or Smoke after 12 midnight unless instructed otherwise by your Surgeon. OK to brush teeth, no gum, candy or mints!    MORNING OF SURGERY, drink small sip of water with the following medications instructed by Surgery Pre-Admit Provider:  Carvedilol     !!!STOP ALL Aspirins, NSAIDS, WEIGHT LOSS INJECTIONS/PILLS, Herbal supplements, & Vitamins 7 DAYS BEFORE SURGERY!!!    ____  Avoid Alcoholic beverages 3 days prior to surgery, as it can thin the blood.  ____  NO Acrylic/fake nails or nail polish worn day of surgery (specifically hand/arm & foot surgeries).  ____  NO powder, lotions, deodorants, oils or cream on body.  ____  Remove all jewelry, piercings, & foreign objects prior to arrival and leave at home.  ____  Remove Dentures, Hearing Aids & Contact Lens prior to surgery.  ____  Bring photo ID and insurance information to hospital (Leave Valuables at Home).  ____  If going home the same day, arrange for a ride home. You will not be able to drive for 24 hrs if Anesthesia was used.   ____  Females (ages 11-60): may need to give a urine sample the morning of surgery; please see Pre op Nurse prior to using the restroom.  ____  Males: Stop ED medications (Viagra, Cialis) 24 hrs prior to surgery.  ____  Wear clean, loose fitting clothing to allow for dressings/ bandages.      Bathing Instructions:    -Shower with anti-bacterial Soap (Hibiclens or Dial) the night before surgery & the morning of surgery!   -Do not use Hibiclens  soap on your face or genitals.   -Apply clean clothes after shower.  -Do not shave your face or body 2 days prior to surgery unless instructed otherwise by your Surgeon.  -Do not shave pubic hair 7 days prior to surgery (gyn pt's).    [facility] Vis[facility]/Ride Policy:  Only 2 adults allowed in pre op/recovery area during your procedure. You MUST HAVE A RIDE HOME from a responsible adult that you know and trust. Medical Transport, Uber or Lyft can ONLY be used if patient has a responsible adult to accompany them during ride home.       *Signs and symptoms of Infection Before or After Surgery:               !!!If you experience any fever, chills, nausea/ vomiting, foul odor/ excessive drainage from surgical site, flu-like symptoms, new wounds or cuts, PLEASE CALL THE SURGEON OFFICE at [phone] or SEND MESSAGE THROUGH [employer]!!!       *If you are running late day of surgery, please call the Surgery Dept @ [phone].    *Billing question, please call:  [phone] or [phone]       Thank you,  -[facility] Pre [facility].  [phone] or [phone]  M-F 7:30 am-4:00 pm (Closed Major Holidays)    Additional Tests Scheduled Today:  urine (1st Floor) Check in at the !

## 2024-08-19 NOTE — ASSESSMENT & PLAN NOTE
--Elevated on today's exam (154/92). Did not take medications this morning. Will take later today.   She will continue carvedilol on morning of surgery, she was asked to hold Benicar/HCT

## 2024-08-19 NOTE — ASSESSMENT & PLAN NOTE
Robotic Hysterectomy with Salpingectomy on 8/22/24 by Dr. Dwyer.     Known risk factors for perioperative complications: None    Difficulty with intubation is not anticipated.    Cardiac Risk Estimation: Based on the Revised Cardiac I Risk index, patient is a Class risk with a 3.9% risk of a major cardiac event in a low risk procedure.    1.) Preoperative workup as follows: chest x-ray, ECG, hemoglobin, hematocrit, electrolytes, creatinine, urinalysis (urinary tract instrumentation planned).  2.) Change in medication regimen before surgery: discontinue ASA 6 days before surgery, discontinue NSAIDs () 5 days before surgery, hold Metformin 24 hours prior to surgery.  3.) Prophylaxis for cardiac events with perioperative beta-blockers: carvedilol.  4.) Invasive hemodynamic monitoring perioperatively: at the discretion of anesthesiologist.  5.) Deep vein thrombosis prophylaxis postoperatively: regimen to be chosen by surgical team.  6.) Surveillance for postoperative MI with ECG immediately postoperatively and on postoperati ve days 1 and 2 AND troponin levels 24 hours postoperatively and on day 4 or hospital discharge (whichever comes first): at the discretion of anesthesiologist.  7.) Current medications which may produce withdrawal symptoms if withheld perioperatively: N/A  8.) Other measures: Postoperative incentive spirometry to prevent pneumonia.     --Morning of Procedure medications: Carvedilol  --Hold all other medications morning of surgery: Benicar/HCT  --Resume all medications post-operatively   --Hold Vitamins/supplements 7 days prior to procedure    UA REVIEWED:    Per Dr. Dwyer: Post op course reviewed (no lfiting, straining for 6 wks); pelvic rest x12 wks

## 2024-08-21 ENCOUNTER — TELEPHONE (OUTPATIENT)
Dept: PREADMISSION TESTING | Facility: HOSPITAL | Age: 49
End: 2024-08-21
Payer: COMMERCIAL

## 2024-08-21 NOTE — TELEPHONE ENCOUNTER
Called and spoke with patient about the following:     Please arrive to [facility] at 7:15am on 8/22/2024 for your scheduled procedure.  Address: [address] [place] [address] [place] [address] ([address] Building on left, 1st Floor Lobby)  >>>NO eating or drinking after midnight unless instructed otherwise by your Surgeon<<<    Thank you,  -[facility] Pre Admit Testing Dept.  Mon-Fri 7:30 am - 4 pm [phone]

## 2024-08-22 ENCOUNTER — HOSPITAL ENCOUNTER (OUTPATIENT)
Facility: HOSPITAL | Age: 49
Discharge: HOME | End: 2024-08-22
Attending: OBSTETRICS & GYNECOLOGY | Admitting: OBSTETRICS & GYNECOLOGY
Payer: COMMERCIAL

## 2024-08-22 ENCOUNTER — ANESTHESIA (OUTPATIENT)
Dept: SURGERY | Facility: HOSPITAL | Age: 49
End: 2024-08-22
Payer: COMMERCIAL

## 2024-08-22 ENCOUNTER — ANESTHESIA EVENT (OUTPATIENT)
Dept: SURGERY | Facility: HOSPITAL | Age: 49
End: 2024-08-22
Payer: COMMERCIAL

## 2024-08-22 VITALS
TEMPERATURE: 98 F | RESPIRATION RATE: 16 BRPM | HEIGHT: 65 IN | OXYGEN SATURATION: 99 % | SYSTOLIC BLOOD PRESSURE: 160 MMHG | BODY MASS INDEX: 36.03 KG/M2 | DIASTOLIC BLOOD PRESSURE: 91 MMHG | WEIGHT: 216.25 LBS | HEART RATE: 91 BPM

## 2024-08-22 DIAGNOSIS — N81.4 UTERINE PROLAPSE: ICD-10-CM

## 2024-08-22 DIAGNOSIS — Z01.818 PREOP TESTING: Primary | ICD-10-CM

## 2024-08-22 DIAGNOSIS — Z01.818 PREOP EXAMINATION: ICD-10-CM

## 2024-08-22 PROBLEM — M62.89 PELVIC FLOOR DYSFUNCTION: Status: RESOLVED | Noted: 2023-10-17 | Resolved: 2024-08-22

## 2024-08-22 PROBLEM — Z90.710 STATUS POST LAPAROSCOPIC HYSTERECTOMY: Status: ACTIVE | Noted: 2024-08-22

## 2024-08-22 LAB
B-HCG UR QL: NEGATIVE
CTP QC/QA: YES
POCT GLUCOSE: 112 MG/DL (ref 70–110)

## 2024-08-22 PROCEDURE — 27201423 OPTIME MED/SURG SUP & DEVICES STERILE SUPPLY: Performed by: OBSTETRICS & GYNECOLOGY

## 2024-08-22 PROCEDURE — 88300 SURGICAL PATH GROSS: CPT | Mod: 26,59,, | Performed by: PATHOLOGY

## 2024-08-22 PROCEDURE — 8E0W4CZ ROBOTIC ASSISTED PROCEDURE OF TRUNK REGION, PERCUTANEOUS ENDOSCOPIC APPROACH: ICD-10-PCS | Performed by: OBSTETRICS & GYNECOLOGY

## 2024-08-22 PROCEDURE — 63600175 PHARM REV CODE 636 W HCPCS: Performed by: OBSTETRICS & GYNECOLOGY

## 2024-08-22 PROCEDURE — 88305 TISSUE EXAM BY PATHOLOGIST: CPT | Performed by: PATHOLOGY

## 2024-08-22 PROCEDURE — 58571 TLH W/T/O 250 G OR LESS: CPT | Mod: ,,, | Performed by: OBSTETRICS & GYNECOLOGY

## 2024-08-22 PROCEDURE — 25000003 PHARM REV CODE 250: Performed by: OBSTETRICS & GYNECOLOGY

## 2024-08-22 PROCEDURE — 0UT74ZZ RESECTION OF BILATERAL FALLOPIAN TUBES, PERCUTANEOUS ENDOSCOPIC APPROACH: ICD-10-PCS | Performed by: OBSTETRICS & GYNECOLOGY

## 2024-08-22 PROCEDURE — 88305 TISSUE EXAM BY PATHOLOGIST: CPT | Mod: 26,,, | Performed by: PATHOLOGY

## 2024-08-22 PROCEDURE — 0UPD4HZ REMOVAL OF CONTRACEPTIVE DEVICE FROM UTERUS AND CERVIX, PERCUTANEOUS ENDOSCOPIC APPROACH: ICD-10-PCS | Performed by: OBSTETRICS & GYNECOLOGY

## 2024-08-22 PROCEDURE — C2628 CATHETER, OCCLUSION: HCPCS | Performed by: OBSTETRICS & GYNECOLOGY

## 2024-08-22 PROCEDURE — 71000015 HC POSTOP RECOV 1ST HR: Performed by: OBSTETRICS & GYNECOLOGY

## 2024-08-22 PROCEDURE — 63600175 PHARM REV CODE 636 W HCPCS: Performed by: NURSE ANESTHETIST, CERTIFIED REGISTERED

## 2024-08-22 PROCEDURE — 58571 TLH W/T/O 250 G OR LESS: CPT | Mod: AS,,, | Performed by: PHYSICIAN ASSISTANT

## 2024-08-22 PROCEDURE — 71000033 HC RECOVERY, INTIAL HOUR: Performed by: OBSTETRICS & GYNECOLOGY

## 2024-08-22 PROCEDURE — 81025 URINE PREGNANCY TEST: CPT | Performed by: OBSTETRICS & GYNECOLOGY

## 2024-08-22 PROCEDURE — 36000712 HC OR TIME LEV V 1ST 15 MIN: Performed by: OBSTETRICS & GYNECOLOGY

## 2024-08-22 PROCEDURE — 25000003 PHARM REV CODE 250: Performed by: NURSE ANESTHETIST, CERTIFIED REGISTERED

## 2024-08-22 PROCEDURE — 37000008 HC ANESTHESIA 1ST 15 MINUTES: Performed by: OBSTETRICS & GYNECOLOGY

## 2024-08-22 PROCEDURE — 71000039 HC RECOVERY, EACH ADD'L HOUR: Performed by: OBSTETRICS & GYNECOLOGY

## 2024-08-22 PROCEDURE — 0UT94ZZ RESECTION OF UTERUS, PERCUTANEOUS ENDOSCOPIC APPROACH: ICD-10-PCS | Performed by: OBSTETRICS & GYNECOLOGY

## 2024-08-22 PROCEDURE — 36000713 HC OR TIME LEV V EA ADD 15 MIN: Performed by: OBSTETRICS & GYNECOLOGY

## 2024-08-22 PROCEDURE — 88300 SURGICAL PATH GROSS: CPT | Performed by: PATHOLOGY

## 2024-08-22 PROCEDURE — 37000009 HC ANESTHESIA EA ADD 15 MINS: Performed by: OBSTETRICS & GYNECOLOGY

## 2024-08-22 PROCEDURE — 0UT24ZZ RESECTION OF BILATERAL OVARIES, PERCUTANEOUS ENDOSCOPIC APPROACH: ICD-10-PCS | Performed by: OBSTETRICS & GYNECOLOGY

## 2024-08-22 RX ORDER — AMOXICILLIN 250 MG
1 CAPSULE ORAL 2 TIMES DAILY
Status: DISCONTINUED | OUTPATIENT
Start: 2024-08-22 | End: 2024-08-22 | Stop reason: HOSPADM

## 2024-08-22 RX ORDER — LIDOCAINE HYDROCHLORIDE 10 MG/ML
0.5 INJECTION, SOLUTION EPIDURAL; INFILTRATION; INTRACAUDAL; PERINEURAL
Status: DISCONTINUED | OUTPATIENT
Start: 2024-08-22 | End: 2024-08-22 | Stop reason: HOSPADM

## 2024-08-22 RX ORDER — HYDROCODONE BITARTRATE AND ACETAMINOPHEN 10; 325 MG/1; MG/1
1 TABLET ORAL EVERY 4 HOURS PRN
Status: DISCONTINUED | OUTPATIENT
Start: 2024-08-22 | End: 2024-08-22 | Stop reason: HOSPADM

## 2024-08-22 RX ORDER — MEPERIDINE HYDROCHLORIDE 25 MG/ML
12.5 INJECTION INTRAMUSCULAR; INTRAVENOUS; SUBCUTANEOUS ONCE AS NEEDED
Status: COMPLETED | OUTPATIENT
Start: 2024-08-22 | End: 2024-08-22

## 2024-08-22 RX ORDER — PROCHLORPERAZINE EDISYLATE 5 MG/ML
5 INJECTION INTRAMUSCULAR; INTRAVENOUS EVERY 6 HOURS PRN
Status: DISCONTINUED | OUTPATIENT
Start: 2024-08-22 | End: 2024-08-22 | Stop reason: HOSPADM

## 2024-08-22 RX ORDER — DIPHENHYDRAMINE HYDROCHLORIDE 50 MG/ML
25 INJECTION INTRAMUSCULAR; INTRAVENOUS EVERY 4 HOURS PRN
Status: DISCONTINUED | OUTPATIENT
Start: 2024-08-22 | End: 2024-08-22 | Stop reason: HOSPADM

## 2024-08-22 RX ORDER — DEXAMETHASONE SODIUM PHOSPHATE 4 MG/ML
INJECTION, SOLUTION INTRA-ARTICULAR; INTRALESIONAL; INTRAMUSCULAR; INTRAVENOUS; SOFT TISSUE
Status: DISCONTINUED | OUTPATIENT
Start: 2024-08-22 | End: 2024-08-22

## 2024-08-22 RX ORDER — SODIUM CHLORIDE, SODIUM LACTATE, POTASSIUM CHLORIDE, CALCIUM CHLORIDE 600; 310; 30; 20 MG/100ML; MG/100ML; MG/100ML; MG/100ML
INJECTION, SOLUTION INTRAVENOUS CONTINUOUS
Status: DISCONTINUED | OUTPATIENT
Start: 2024-08-22 | End: 2024-08-22 | Stop reason: HOSPADM

## 2024-08-22 RX ORDER — ONDANSETRON HYDROCHLORIDE 2 MG/ML
4 INJECTION, SOLUTION INTRAVENOUS DAILY PRN
Status: DISCONTINUED | OUTPATIENT
Start: 2024-08-22 | End: 2024-08-22 | Stop reason: HOSPADM

## 2024-08-22 RX ORDER — ONDANSETRON HYDROCHLORIDE 2 MG/ML
INJECTION, SOLUTION INTRAVENOUS
Status: DISCONTINUED | OUTPATIENT
Start: 2024-08-22 | End: 2024-08-22

## 2024-08-22 RX ORDER — ONDANSETRON 8 MG/1
8 TABLET, ORALLY DISINTEGRATING ORAL EVERY 8 HOURS PRN
Status: DISCONTINUED | OUTPATIENT
Start: 2024-08-22 | End: 2024-08-22 | Stop reason: HOSPADM

## 2024-08-22 RX ORDER — ACETAMINOPHEN 500 MG
1000 TABLET ORAL
Status: COMPLETED | OUTPATIENT
Start: 2024-08-22 | End: 2024-08-22

## 2024-08-22 RX ORDER — MUPIROCIN 20 MG/G
OINTMENT TOPICAL
Status: DISCONTINUED | OUTPATIENT
Start: 2024-08-22 | End: 2024-08-22 | Stop reason: HOSPADM

## 2024-08-22 RX ORDER — HYDROCODONE BITARTRATE AND ACETAMINOPHEN 5; 325 MG/1; MG/1
1 TABLET ORAL EVERY 4 HOURS PRN
Status: DISCONTINUED | OUTPATIENT
Start: 2024-08-22 | End: 2024-08-22 | Stop reason: HOSPADM

## 2024-08-22 RX ORDER — PREGABALIN 25 MG/1
50 CAPSULE ORAL
Status: COMPLETED | OUTPATIENT
Start: 2024-08-22 | End: 2024-08-22

## 2024-08-22 RX ORDER — HYDROCODONE BITARTRATE AND ACETAMINOPHEN 5; 325 MG/1; MG/1
1 TABLET ORAL EVERY 6 HOURS PRN
Qty: 15 TABLET | Refills: 0 | Status: CANCELLED | OUTPATIENT
Start: 2024-08-22 | End: 2024-08-29

## 2024-08-22 RX ORDER — KETAMINE HCL IN 0.9 % NACL 50 MG/5 ML
SYRINGE (ML) INTRAVENOUS
Status: DISCONTINUED | OUTPATIENT
Start: 2024-08-22 | End: 2024-08-22

## 2024-08-22 RX ORDER — LIDOCAINE HYDROCHLORIDE 10 MG/ML
INJECTION, SOLUTION EPIDURAL; INFILTRATION; INTRACAUDAL; PERINEURAL
Status: DISCONTINUED | OUTPATIENT
Start: 2024-08-22 | End: 2024-08-22

## 2024-08-22 RX ORDER — FAMOTIDINE 20 MG/1
20 TABLET, FILM COATED ORAL
Status: COMPLETED | OUTPATIENT
Start: 2024-08-22 | End: 2024-08-22

## 2024-08-22 RX ORDER — DIPHENHYDRAMINE HCL 25 MG
25 CAPSULE ORAL EVERY 4 HOURS PRN
Status: DISCONTINUED | OUTPATIENT
Start: 2024-08-22 | End: 2024-08-22 | Stop reason: HOSPADM

## 2024-08-22 RX ORDER — PROCHLORPERAZINE EDISYLATE 5 MG/ML
5 INJECTION INTRAMUSCULAR; INTRAVENOUS EVERY 10 MIN PRN
Status: DISCONTINUED | OUTPATIENT
Start: 2024-08-22 | End: 2024-08-22 | Stop reason: HOSPADM

## 2024-08-22 RX ORDER — PROPOFOL 10 MG/ML
VIAL (ML) INTRAVENOUS
Status: DISCONTINUED | OUTPATIENT
Start: 2024-08-22 | End: 2024-08-22

## 2024-08-22 RX ORDER — FENTANYL CITRATE 50 UG/ML
INJECTION, SOLUTION INTRAMUSCULAR; INTRAVENOUS
Status: DISCONTINUED | OUTPATIENT
Start: 2024-08-22 | End: 2024-08-22

## 2024-08-22 RX ORDER — ROCURONIUM BROMIDE 10 MG/ML
INJECTION, SOLUTION INTRAVENOUS
Status: DISCONTINUED | OUTPATIENT
Start: 2024-08-22 | End: 2024-08-22

## 2024-08-22 RX ORDER — IBUPROFEN 200 MG
800 TABLET ORAL
Status: DISCONTINUED | OUTPATIENT
Start: 2024-08-23 | End: 2024-08-22 | Stop reason: HOSPADM

## 2024-08-22 RX ORDER — SODIUM CHLORIDE 0.9 % (FLUSH) 0.9 %
3 SYRINGE (ML) INJECTION
Status: DISCONTINUED | OUTPATIENT
Start: 2024-08-22 | End: 2024-08-22 | Stop reason: HOSPADM

## 2024-08-22 RX ORDER — SODIUM CHLORIDE, SODIUM LACTATE, POTASSIUM CHLORIDE, CALCIUM CHLORIDE 600; 310; 30; 20 MG/100ML; MG/100ML; MG/100ML; MG/100ML
INJECTION, SOLUTION INTRAVENOUS CONTINUOUS PRN
Status: DISCONTINUED | OUTPATIENT
Start: 2024-08-22 | End: 2024-08-22

## 2024-08-22 RX ORDER — HYDROCODONE BITARTRATE AND ACETAMINOPHEN 5; 325 MG/1; MG/1
1 TABLET ORAL EVERY 6 HOURS PRN
Qty: 15 TABLET | Refills: 0 | Status: ON HOLD | OUTPATIENT
Start: 2024-08-22 | End: 2024-08-30 | Stop reason: HOSPADM

## 2024-08-22 RX ORDER — HYDROMORPHONE HYDROCHLORIDE 2 MG/ML
1 INJECTION, SOLUTION INTRAMUSCULAR; INTRAVENOUS; SUBCUTANEOUS EVERY 4 HOURS PRN
Status: DISCONTINUED | OUTPATIENT
Start: 2024-08-22 | End: 2024-08-22 | Stop reason: HOSPADM

## 2024-08-22 RX ORDER — MIDAZOLAM HYDROCHLORIDE 1 MG/ML
INJECTION INTRAMUSCULAR; INTRAVENOUS
Status: DISCONTINUED | OUTPATIENT
Start: 2024-08-22 | End: 2024-08-22

## 2024-08-22 RX ORDER — POLYETHYLENE GLYCOL 3350 17 G/17G
17 POWDER, FOR SOLUTION ORAL DAILY
Status: DISCONTINUED | OUTPATIENT
Start: 2024-08-22 | End: 2024-08-22 | Stop reason: HOSPADM

## 2024-08-22 RX ORDER — CELECOXIB 100 MG/1
400 CAPSULE ORAL
Status: COMPLETED | OUTPATIENT
Start: 2024-08-22 | End: 2024-08-22

## 2024-08-22 RX ORDER — ACETAMINOPHEN 325 MG/1
650 TABLET ORAL EVERY 4 HOURS PRN
Status: DISCONTINUED | OUTPATIENT
Start: 2024-08-22 | End: 2024-08-22 | Stop reason: HOSPADM

## 2024-08-22 RX ORDER — KETOROLAC TROMETHAMINE 30 MG/ML
30 INJECTION, SOLUTION INTRAMUSCULAR; INTRAVENOUS
Status: DISCONTINUED | OUTPATIENT
Start: 2024-08-22 | End: 2024-08-22 | Stop reason: HOSPADM

## 2024-08-22 RX ORDER — KETOROLAC TROMETHAMINE 30 MG/ML
INJECTION, SOLUTION INTRAMUSCULAR; INTRAVENOUS
Status: DISCONTINUED | OUTPATIENT
Start: 2024-08-22 | End: 2024-08-22

## 2024-08-22 RX ORDER — HYDROMORPHONE HYDROCHLORIDE 2 MG/ML
0.2 INJECTION, SOLUTION INTRAMUSCULAR; INTRAVENOUS; SUBCUTANEOUS EVERY 5 MIN PRN
Status: COMPLETED | OUTPATIENT
Start: 2024-08-22 | End: 2024-08-22

## 2024-08-22 RX ADMIN — FENTANYL CITRATE 50 MCG: 50 INJECTION, SOLUTION INTRAMUSCULAR; INTRAVENOUS at 09:08

## 2024-08-22 RX ADMIN — PROPOFOL 160 MG: 10 INJECTION, EMULSION INTRAVENOUS at 08:08

## 2024-08-22 RX ADMIN — HYDROMORPHONE HYDROCHLORIDE 0.2 MG: 2 INJECTION INTRAMUSCULAR; INTRAVENOUS; SUBCUTANEOUS at 11:08

## 2024-08-22 RX ADMIN — MEPERIDINE HYDROCHLORIDE 12.5 MG: 25 INJECTION INTRAMUSCULAR; INTRAVENOUS; SUBCUTANEOUS at 11:08

## 2024-08-22 RX ADMIN — DEXAMETHASONE SODIUM PHOSPHATE 8 MG: 4 INJECTION, SOLUTION INTRA-ARTICULAR; INTRALESIONAL; INTRAMUSCULAR; INTRAVENOUS; SOFT TISSUE at 08:08

## 2024-08-22 RX ADMIN — LIDOCAINE HYDROCHLORIDE 100 MG: 10 SOLUTION INTRAVENOUS at 08:08

## 2024-08-22 RX ADMIN — SODIUM CHLORIDE, SODIUM LACTATE, POTASSIUM CHLORIDE, AND CALCIUM CHLORIDE: 600; 310; 30; 20 INJECTION, SOLUTION INTRAVENOUS at 09:08

## 2024-08-22 RX ADMIN — CEFAZOLIN 2 G: 2 INJECTION, POWDER, FOR SOLUTION INTRAMUSCULAR; INTRAVENOUS at 08:08

## 2024-08-22 RX ADMIN — HYDROMORPHONE HYDROCHLORIDE 0.2 MG: 2 INJECTION INTRAMUSCULAR; INTRAVENOUS; SUBCUTANEOUS at 12:08

## 2024-08-22 RX ADMIN — Medication 25 MG: at 09:08

## 2024-08-22 RX ADMIN — CELECOXIB 400 MG: 100 CAPSULE ORAL at 07:08

## 2024-08-22 RX ADMIN — SUGAMMADEX 200 MG: 100 INJECTION, SOLUTION INTRAVENOUS at 10:08

## 2024-08-22 RX ADMIN — ACETAMINOPHEN 1000 MG: 500 TABLET ORAL at 07:08

## 2024-08-22 RX ADMIN — FAMOTIDINE 20 MG: 20 TABLET ORAL at 07:08

## 2024-08-22 RX ADMIN — MIDAZOLAM HYDROCHLORIDE 2 MG: 1 INJECTION, SOLUTION INTRAMUSCULAR; INTRAVENOUS at 08:08

## 2024-08-22 RX ADMIN — ROCURONIUM BROMIDE 50 MG: 10 SOLUTION INTRAVENOUS at 08:08

## 2024-08-22 RX ADMIN — PREGABALIN 50 MG: 25 CAPSULE ORAL at 07:08

## 2024-08-22 RX ADMIN — KETOROLAC TROMETHAMINE 30 MG: 30 INJECTION, SOLUTION INTRAMUSCULAR; INTRAVENOUS at 10:08

## 2024-08-22 RX ADMIN — ONDANSETRON 4 MG: 2 INJECTION INTRAMUSCULAR; INTRAVENOUS at 10:08

## 2024-08-22 RX ADMIN — LIDOCAINE HYDROCHLORIDE 50 MG: 10 SOLUTION INTRAVENOUS at 10:08

## 2024-08-22 RX ADMIN — SODIUM CHLORIDE, SODIUM LACTATE, POTASSIUM CHLORIDE, AND CALCIUM CHLORIDE: 600; 310; 30; 20 INJECTION, SOLUTION INTRAVENOUS at 08:08

## 2024-08-22 RX ADMIN — ROCURONIUM BROMIDE 15 MG: 10 SOLUTION INTRAVENOUS at 09:08

## 2024-08-22 RX ADMIN — FENTANYL CITRATE 50 MCG: 50 INJECTION, SOLUTION INTRAMUSCULAR; INTRAVENOUS at 08:08

## 2024-08-22 NOTE — DISCHARGE SUMMARY
[facility] - Surgery (Hospital)  Discharge Note  Short Stay    Procedure(s) (LRB):  XI ROBOTIC HYSTERECTOMY,WITH SALPINGO-OOPHORECTOMY (Bilateral)  REMOVAL, INTRAUTERINE DEVICE (N/A)      OUTCOME: Patient tolerated treatment/procedure well without complication and is now ready for discharge.    DISPOSITION: Home or Self Care    FINAL DIAGNOSIS: Uterine prolapse     FOLLOWUP: In clinic    DISCHARGE INSTRUCTIONS:    Discharge Procedure Orders   Urinalysis   Standing Status: Future Number of Occurrences: 1 Standing Exp. Date: 09/19/24     Order Specific Question Answer Comments   Collection Type Urine, Clean Catch      Ambulatory referral/consult to Pre-Admit   Standing Status: Future   Referral Priority: Routine Referral Type: Consultation   Referral Reason: Specialty Services Required   Requested Specialty: Internal Medicine   Number of Visits Requested: 1        TIME SPENT ON DISCHARGE: 5 minutes

## 2024-08-22 NOTE — TRANSFER OF CARE
"Anesthesia Transfer of Care Note    Patient: Estefany Berumen    Procedure(s) Performed: Procedure(s) (LRB):  XI ROBOTIC HYSTERECTOMY,WITH SALPINGO-OOPHORECTOMY (Bilateral)  REMOVAL, INTRAUTERINE DEVICE (N/A)    Patient location: PACU    Anesthesia Type: general    Transport from OR: Transported from OR on room air with adequate spontaneous ventilation    Post pain: adequate analgesia    Post assessment: no apparent anesthetic complications    Post vital signs: stable    Level of consciousness: responds to stimulation    Nausea/Vomiting: no nausea/vomiting    Complications: none    Transfer of care protocol was followed      Last vitals: Visit Vitals  /73 (BP Location: Left arm, Patient Position: Lying)   Pulse 87   Temp 36.5 °C (97.7 °F) (Temporal)   Resp 16   Ht 5' 5" (1.651 m)   Wt 98.1 kg (216 lb 4.3 oz)   SpO2 95%   Breastfeeding No   BMI 35.99 kg/m²     "

## 2024-08-22 NOTE — ANESTHESIA PREPROCEDURE EVALUATION
08/22/2024  Estefany Berumen is a 48 y.o., female    Patient Active Problem List   Diagnosis    Pelvic floor dysfunction    Hypertension    Uterine prolapse     Past Medical History:   Diagnosis Date    Hypertension     Myocardial infarction      Past Surgical History:   Procedure Laterality Date    abdominalplasty      LIPOSUCTION  05/28/2024    back    REDUCTION OF BOTH BREASTS Bilateral 2015    TOTAL REDUCTION MAMMOPLASTY Bilateral 2015    tummy tuck  04/24/2024         Chemistry        Component Value Date/Time     08/09/2024 1343    K 3.9 08/09/2024 1343     08/09/2024 1343    CO2 23 08/09/2024 1343    BUN 10 08/09/2024 1343    CREATININE 0.8 08/09/2024 1343     08/09/2024 1343        Component Value Date/Time    CALCIUM 9.4 08/09/2024 1343    AST 20 11/11/2020 1345    ALT 20 11/11/2020 1345    BILITOT 0.3 11/11/2020 1345    EGFRNONAA 105 11/11/2020 1345        Lab Results   Component Value Date    WBC 5.74 08/09/2024    HGB 12.4 08/09/2024    HCT 40.3 08/09/2024    MCV 88 08/09/2024     (H) 08/09/2024       Pre-op Assessment    I have reviewed the Patient Summary Reports.    I have reviewed the NPO Status.   I have reviewed the Medications.     Review of Systems  Anesthesia Hx:  No problems with previous Anesthesia   History of prior surgery of interest to airway management or planning:  Previous anesthesia: General          Denies Personal Hx of Anesthesia complications.                    Social:  Non-Smoker       Hematology/Oncology:  Hematology Normal   Oncology Normal                                   Cardiovascular:     Hypertension  Past MI               ECG has been reviewed. Normal sinus rhythm   Nonspecific ST abnormality   Abnormal ECG   No previous ECGs available   Confirmed by Ferny GODOY, Natalie' BMiles ([id number]) on 8/10/2024 10:52:42 AM                             Pulmonary:  Pulmonary Normal                       Renal/:  Renal/ Normal                 Musculoskeletal:  Musculoskeletal Normal                OB/GYN/PEDS:  Pelvic floor dysfunction           Endocrine:     BMI 36      Obesity / BMI > 30      Physical Exam  General: Well nourished, Cooperative, Alert and Oriented    Airway:  Mallampati: III   Mouth Opening: Normal  TM Distance: Normal  Tongue: Normal  Neck ROM: Normal ROM    Dental:  Intact  Patient denies any currently loose or chipped teeth; Patient denies any removable dental appliances        Anesthesia Plan  Type of Anesthesia, risks & benefits discussed:    Anesthesia Type: Gen ETT  Intra-op Monitoring Plan: Standard ASA Monitors  Post Op Pain Control Plan: multimodal analgesia and IV/PO Opioids PRN  Induction:  IV  Airway Plan: Direct, Post-Induction  Informed Consent: Informed consent signed with the Patient and all parties understand the risks and agree with anesthesia plan.  All questions answered.   ASA Score: 2  Day of Surgery Review of History & Physical: H&P Update referred to the surgeon/provider.  Anesthesia Plan Notes: Took her Coreg this AM     Ready For Surgery From Anesthesia Perspective.     .

## 2024-08-22 NOTE — OP NOTE
[facility] - Surgery ([facility])  Surgery Department  Operative Note    SUMMARY     Date of Procedure: 8/22/2024     Procedure: Procedure(s) (LRB):  XI ROBOTIC HYSTERECTOMY,WITH SALPINGO-OOPHORECTOMY (Bilateral)  REMOVAL, INTRAUTERINE DEVICE (N/A)     Surgeons and Role:     * CAT Dwyer MD - Primary    Assisting Surgeon: Rahel CABRERA assistance deemed necessary by MD     Pre-Operative Diagnosis: Uterine prolapse [N81.4]    Post-Operative Diagnosis: Post-Op Diagnosis Codes:     * Uterine prolapse [N81.4]    Anesthesia: General    Operative Findings (including complications, if any):     Description of Technical Procedures:     Procedure in Detail/Findings:  Operative findings    Upper abdomen normal liver , no adhesions, no bowel abnormalities    Pelvis:  Uterus normal size and shape,  Ovaries and tubes normal ,                  No Adhesions, No endometriosis                 Ureters in there normal retroperitoneal position through the pelvis                 Bladder Flap with no adhesions         The patient was taken to the surgical suite.  General              intubation  anesthesia is induced.                                                                   The patient was placed in supine lithotomy  position  with low Ethan stirrups                                                                   The vagina was prepped with Betadine.      Mirena IUD removed from the cervix intact    A Londono     catheter, MATT manipulator with  KOH colpotomizer placed.    Abdomen prepped   with  chlorhexidine solution                                                                Sterile drapes applied     Time-out taken with all members of the operating team.       Veress  needle placed through the umbilicus, abdomen elevated with towel clamps.     CO2 gas insufflation to 15 mmHg.      Trochar placement as follows:       8 mm bladeless trochars:      4 trochars, transverse line 2 cm above the umbilicus, 8 cm  apart with direct visualization after the initial placement                     The da Robby robotic XI   system docked from the right side of the patient.     .    Instrument placement as follows   Camera:  Right midline port   Unipolar Scissors:  Right lateral port   Bipolar Forceps:  Left midline port   Fenestrated Forceps:  Left lateral port    The surgeon moved to the robotic console and the first assistant remained at the bedside for uterine manipulation and utilization of instruments placed through the left lateral trochar                                                                           Prior to starting the hysterectomy, the anatomical landmarks of the pelvis and the pelvic course of the ureters right and left are identified.   The Maryland bipolar is used for cautery and the scissors used for transection of the pedicles .  The  round ligaments are  Transected after cautery on each side and dissection of the broad ligament and development of the bladder flap is accomplished  with the unipolar scissors.  Retroperitoneal dissection lateral to the IP vessels done bilaterally to expose the blood supply of the ovaries at the pelvic brim 3 cm proximal to the ovary and tube.  The IP vessels taken down with Maryland cautery and cut with scissors bilaterally     The anterior and posterior vagina entered over the colpotomizer with unipolar scissors and the colpotomy is extended over the colpotomizer anterior and posterior to the ascending uterine bilateral uterine arteries.    The Uterine vessels are then cauterized with the Maryland instrument and transected.     With the uterus free of blood supply and vaginal connection, it is removed with the cervix and bilateral tubes and ovaries  through the vagina.    Bleeding of the uterine pedicle or vaginal cuff is cauterized.   Closure of the vaginal cuff done with Zero Vicryl suture interrupted at each angle followed by double layer running 2 zero V Lock  absorbable suture across the cuff opening     Using zero Vicryl suture, the left and the right uterosacral ligaments were shortened by running suture from the base of each ligament to the vaginal cuff attachement        All needles used in the abdomen removed through the vagina or  the 8 mm trocar on the left are  accounted for.       Good hemostasis was  Appreciated in the operative field, any bleeding noted cauterized with bipolar instrument     Any excess fluid was suctioned free.    After the instruments were removed, the da Robby robotic system was undocked       All trochar  sites closed with 4-0 Monocryl  subcuticular and Dermabond.       The specimen removed from the vagina.  Vagina examined for any tears.       The  patient taken to Recovery in stable condition with zhao catheter in place               Significant Surgical Tasks Conducted by the Assistant(s), if Applicable: first assist bedside     Estimated Blood Loss (EBL): 20 cc           Implants: * No implants in log *    Specimens:   Specimen (24h ago, onward)       Start     Ordered    08/22/24 1022  Specimen to Pathology, Surgery Gynecology and Obstetrics  Once        Comments: Pre-op Diagnosis: Uterine prolapse [N81.4]Procedure(s):XI ROBOTIC HYSTERECTOMY,WITH SALPINGO-OOPHORECTOMYREMOVAL, INTRAUTERINE DEVICE Number of specimens: 2Name of specimens: 1. IUD - Gross2. Uterus, Cervix, Bilateral Fallopian Tubes, and Ovaries - PERM     References:    Click here for ordering Quick Tip   Question Answer Comment   Procedure Type: Gynecology and Obstetrics    Specimen Class: Routine/Screening    Release to patient Immediate        08/22/24 1023                            Condition: Good    Disposition: PACU - hemodynamically stable.    Attestation: Op Note Attestation: I was physically present and scrubbed for the entire procedure.

## 2024-08-23 LAB
FINAL PATHOLOGIC DIAGNOSIS: NORMAL
GROSS: NORMAL
Lab: NORMAL

## 2024-08-23 NOTE — PROGRESS NOTES
I have reviewed the pathology from your surgery and the results are benign, or normal as we expected.   Hope you are recovering well   Please contact our office if you have any needs or concerns  I look forward to seeing you at your next visit  Dr Dwyer

## 2024-08-25 ENCOUNTER — HOSPITAL ENCOUNTER (INPATIENT)
Facility: HOSPITAL | Age: 49
LOS: 5 days | Discharge: HOME HEALTH | DRG: 330 | End: 2024-08-30
Attending: EMERGENCY MEDICINE | Admitting: OBSTETRICS & GYNECOLOGY
Payer: COMMERCIAL

## 2024-08-25 DIAGNOSIS — R07.9 CHEST PAIN: ICD-10-CM

## 2024-08-25 DIAGNOSIS — K56.600 PARTIAL SMALL BOWEL OBSTRUCTION: ICD-10-CM

## 2024-08-25 DIAGNOSIS — Z90.710 S/P HYSTERECTOMY: ICD-10-CM

## 2024-08-25 DIAGNOSIS — Z98.890 S/P EXPLORATORY LAPAROTOMY: Primary | ICD-10-CM

## 2024-08-25 DIAGNOSIS — Z01.89 ENCOUNTER FOR IMAGING STUDY TO CONFIRM NASOGASTRIC (NG) TUBE PLACEMENT: ICD-10-CM

## 2024-08-25 DIAGNOSIS — K56.609 SBO (SMALL BOWEL OBSTRUCTION): ICD-10-CM

## 2024-08-25 LAB
ABO + RH BLD: NORMAL
ALBUMIN SERPL BCP-MCNC: 2.5 G/DL (ref 3.5–5.2)
ALP SERPL-CCNC: 73 U/L (ref 55–135)
ALT SERPL W/O P-5'-P-CCNC: 7 U/L (ref 10–44)
ANION GAP SERPL CALC-SCNC: 11 MMOL/L (ref 8–16)
AST SERPL-CCNC: 15 U/L (ref 10–40)
BACTERIA #/AREA URNS HPF: NORMAL /HPF
BASOPHILS # BLD AUTO: 0.03 K/UL (ref 0–0.2)
BASOPHILS NFR BLD: 0.2 % (ref 0–1.9)
BILIRUB SERPL-MCNC: 0.6 MG/DL (ref 0.1–1)
BILIRUB UR QL STRIP: NEGATIVE
BLD GP AB SCN CELLS X3 SERPL QL: NORMAL
BUN SERPL-MCNC: 21 MG/DL (ref 6–20)
CALCIUM SERPL-MCNC: 9.3 MG/DL (ref 8.7–10.5)
CHLORIDE SERPL-SCNC: 104 MMOL/L (ref 95–110)
CLARITY UR: CLEAR
CO2 SERPL-SCNC: 23 MMOL/L (ref 23–29)
COLOR UR: YELLOW
CREAT SERPL-MCNC: 1 MG/DL (ref 0.5–1.4)
DIFFERENTIAL METHOD BLD: ABNORMAL
EOSINOPHIL # BLD AUTO: 0 K/UL (ref 0–0.5)
EOSINOPHIL NFR BLD: 0.1 % (ref 0–8)
ERYTHROCYTE [DISTWIDTH] IN BLOOD BY AUTOMATED COUNT: 13.2 % (ref 11.5–14.5)
EST. GFR  (NO RACE VARIABLE): >60 ML/MIN/1.73 M^2
GLUCOSE SERPL-MCNC: 127 MG/DL (ref 70–110)
GLUCOSE UR QL STRIP: NEGATIVE
HCT VFR BLD AUTO: 43.4 % (ref 37–48.5)
HGB BLD-MCNC: 13.8 G/DL (ref 12–16)
HGB UR QL STRIP: ABNORMAL
HYALINE CASTS #/AREA URNS LPF: 0 /LPF
IMM GRANULOCYTES # BLD AUTO: 0.07 K/UL (ref 0–0.04)
IMM GRANULOCYTES NFR BLD AUTO: 0.5 % (ref 0–0.5)
KETONES UR QL STRIP: NEGATIVE
LACTATE SERPL-SCNC: 1.1 MMOL/L (ref 0.5–2.2)
LEUKOCYTE ESTERASE UR QL STRIP: NEGATIVE
LYMPHOCYTES # BLD AUTO: 0.8 K/UL (ref 1–4.8)
LYMPHOCYTES NFR BLD: 5.6 % (ref 18–48)
MCH RBC QN AUTO: 26.8 PG (ref 27–31)
MCHC RBC AUTO-ENTMCNC: 31.8 G/DL (ref 32–36)
MCV RBC AUTO: 84 FL (ref 82–98)
MICROSCOPIC COMMENT: NORMAL
MONOCYTES # BLD AUTO: 1.1 K/UL (ref 0.3–1)
MONOCYTES NFR BLD: 7.8 % (ref 4–15)
NEUTROPHILS # BLD AUTO: 12.2 K/UL (ref 1.8–7.7)
NEUTROPHILS NFR BLD: 85.8 % (ref 38–73)
NITRITE UR QL STRIP: NEGATIVE
NRBC BLD-RTO: 0 /100 WBC
PH UR STRIP: 7 [PH] (ref 5–8)
PLATELET # BLD AUTO: 438 K/UL (ref 150–450)
PMV BLD AUTO: 8.9 FL (ref 9.2–12.9)
POTASSIUM SERPL-SCNC: 3.5 MMOL/L (ref 3.5–5.1)
PROT SERPL-MCNC: 6.7 G/DL (ref 6–8.4)
PROT UR QL STRIP: ABNORMAL
RBC # BLD AUTO: 5.15 M/UL (ref 4–5.4)
RBC #/AREA URNS HPF: 0 /HPF (ref 0–4)
SODIUM SERPL-SCNC: 138 MMOL/L (ref 136–145)
SP GR UR STRIP: >1.03 (ref 1–1.03)
SPECIMEN OUTDATE: NORMAL
SQUAMOUS #/AREA URNS HPF: 13 /HPF
URN SPEC COLLECT METH UR: ABNORMAL
UROBILINOGEN UR STRIP-ACNC: NEGATIVE EU/DL
WBC # BLD AUTO: 14.21 K/UL (ref 3.9–12.7)
WBC #/AREA URNS HPF: 0 /HPF (ref 0–5)

## 2024-08-25 PROCEDURE — 86900 BLOOD TYPING SEROLOGIC ABO: CPT | Performed by: EMERGENCY MEDICINE

## 2024-08-25 PROCEDURE — 83605 ASSAY OF LACTIC ACID: CPT | Performed by: EMERGENCY MEDICINE

## 2024-08-25 PROCEDURE — 11000001 HC ACUTE MED/SURG PRIVATE ROOM

## 2024-08-25 PROCEDURE — 85025 COMPLETE CBC W/AUTO DIFF WBC: CPT | Performed by: EMERGENCY MEDICINE

## 2024-08-25 PROCEDURE — 99285 EMERGENCY DEPT VISIT HI MDM: CPT | Mod: 25

## 2024-08-25 PROCEDURE — 99222 1ST HOSP IP/OBS MODERATE 55: CPT | Mod: ,,, | Performed by: SURGERY

## 2024-08-25 PROCEDURE — 86850 RBC ANTIBODY SCREEN: CPT | Performed by: EMERGENCY MEDICINE

## 2024-08-25 PROCEDURE — 81000 URINALYSIS NONAUTO W/SCOPE: CPT | Performed by: EMERGENCY MEDICINE

## 2024-08-25 PROCEDURE — 86901 BLOOD TYPING SEROLOGIC RH(D): CPT | Performed by: EMERGENCY MEDICINE

## 2024-08-25 PROCEDURE — 36415 COLL VENOUS BLD VENIPUNCTURE: CPT | Performed by: EMERGENCY MEDICINE

## 2024-08-25 PROCEDURE — 63600175 PHARM REV CODE 636 W HCPCS: Performed by: OBSTETRICS & GYNECOLOGY

## 2024-08-25 PROCEDURE — 25500020 PHARM REV CODE 255: Performed by: EMERGENCY MEDICINE

## 2024-08-25 PROCEDURE — 25000003 PHARM REV CODE 250: Performed by: OBSTETRICS & GYNECOLOGY

## 2024-08-25 PROCEDURE — 80053 COMPREHEN METABOLIC PANEL: CPT | Performed by: EMERGENCY MEDICINE

## 2024-08-25 RX ORDER — ACETAMINOPHEN 325 MG/1
975 TABLET ORAL EVERY 8 HOURS PRN
Status: DISCONTINUED | OUTPATIENT
Start: 2024-08-25 | End: 2024-08-27

## 2024-08-25 RX ORDER — MORPHINE SULFATE 4 MG/ML
2 INJECTION, SOLUTION INTRAMUSCULAR; INTRAVENOUS EVERY 4 HOURS PRN
Status: DISCONTINUED | OUTPATIENT
Start: 2024-08-25 | End: 2024-08-27

## 2024-08-25 RX ORDER — ONDANSETRON HYDROCHLORIDE 2 MG/ML
4 INJECTION, SOLUTION INTRAVENOUS
Status: COMPLETED | OUTPATIENT
Start: 2024-08-25 | End: 2024-08-25

## 2024-08-25 RX ORDER — DEXTROSE MONOHYDRATE AND SODIUM CHLORIDE 5; .9 G/100ML; G/100ML
INJECTION, SOLUTION INTRAVENOUS CONTINUOUS
Status: DISCONTINUED | OUTPATIENT
Start: 2024-08-25 | End: 2024-08-30 | Stop reason: HOSPADM

## 2024-08-25 RX ORDER — MORPHINE SULFATE 4 MG/ML
4 INJECTION, SOLUTION INTRAMUSCULAR; INTRAVENOUS
Status: COMPLETED | OUTPATIENT
Start: 2024-08-25 | End: 2024-08-25

## 2024-08-25 RX ADMIN — BENZOCAINE, BUTAMBEN, AND TETRACAINE HYDROCHLORIDE 1 SPRAY: .028; .004; .004 AEROSOL, SPRAY TOPICAL at 05:08

## 2024-08-25 RX ADMIN — MORPHINE SULFATE 2 MG: 4 INJECTION INTRAVENOUS at 09:08

## 2024-08-25 RX ADMIN — DEXTROSE AND SODIUM CHLORIDE: 5; 900 INJECTION, SOLUTION INTRAVENOUS at 09:08

## 2024-08-25 RX ADMIN — ONDANSETRON 4 MG: 2 INJECTION INTRAMUSCULAR; INTRAVENOUS at 06:08

## 2024-08-25 RX ADMIN — MORPHINE SULFATE 4 MG: 4 INJECTION INTRAVENOUS at 06:08

## 2024-08-25 RX ADMIN — MORPHINE SULFATE 2 MG: 4 INJECTION INTRAVENOUS at 01:08

## 2024-08-25 RX ADMIN — DEXTROSE AND SODIUM CHLORIDE: 5; 900 INJECTION, SOLUTION INTRAVENOUS at 08:08

## 2024-08-25 RX ADMIN — BENZOCAINE, BUTAMBEN, AND TETRACAINE HYDROCHLORIDE 1 SPRAY: .028; .004; .004 AEROSOL, SPRAY TOPICAL at 11:08

## 2024-08-25 RX ADMIN — IOHEXOL 100 ML: 350 INJECTION, SOLUTION INTRAVENOUS at 03:08

## 2024-08-25 NOTE — H&P
[facility] - Emergency Dept.  Obstetrics & Gynecology  History & Physical    Patient Name: Estefany Berumen  MRN: [id number]  Admission Date: 2024  Primary Care Provider: Lm Rdz MD    Subjective:     Chief Complaint/Reason for Admission: abdominal pain    History of Present Illness:  49 y/o s/p ralhbso for uterine prolapse presents to ER for worsening abdominal pain.  Patient reports actually passing out due to pain.  Feels she has been unable to pass gas.  Patient reports poor appetite due to pain.  Also feels her belly has become more distended.        OB History    Para Term  AB Living   6 1 1 0 5 1   SAB IAB Ectopic Multiple Live Births   5 0 0 0 1      # Outcome Date GA Lbr Dev/2nd Weight Sex Type Anes PTL Lv   6 SAB            5 SAB            4 SAB            3 SAB            2 SAB            1 Term      Vag-Spont   GERMAN     Past Medical History:   Diagnosis Date    Hypertension     Myocardial infarction      Past Surgical History:   Procedure Laterality Date    abdominalplasty      LIPOSUCTION  2024    back    REDUCTION OF BOTH BREASTS Bilateral     REMOVAL OF INTRAUTERINE DEVICE (IUD) N/A 2024    Procedure: REMOVAL, INTRAUTERINE DEVICE;  Surgeon: CAT Dwyer MD;  Location: [facility] OR;  Service: OB/GYN;  Laterality: N/A;    TOTAL REDUCTION MAMMOPLASTY Bilateral     tummy tuck  2024    XI ROBOTIC HYSTERECTOMY, WITH SALPINGO-OOPHORECTOMY Bilateral 2024    Procedure: XI ROBOTIC HYSTERECTOMY,WITH SALPINGO-OOPHORECTOMY;  Surgeon: CAT Dwyer MD;  Location: [facility] OR;  Service: OB/GYN;  Laterality: Bilateral;       (Not in a hospital admission)      Review of patient's allergies indicates:   Allergen Reactions    Sulfa (sulfonamide antibiotics) Other (See Comments)    Sulfamethoxazole-trimethoprim     Trimethoprim Other (See Comments)        Family History       Problem Relation (Age of Onset)    Hypertension Sister, Brother          Tobacco  Use    Smoking status: Never    Smokeless tobacco: Never   Substance and Sexual Activity    Alcohol use: Not Currently    Drug use: Never    Sexual activity: Yes     Partners: Male     Birth control/protection: Implant     Review of Systems   Gastrointestinal:  Positive for abdominal pain.   All other systems reviewed and are negative.     Objective:     Vital Signs (Most Recent):  Temp: 98.3 °F (36.8 °C) (08/25/24 0304)  Pulse: (!) 112 (08/25/24 0730)  Resp: 19 (08/25/24 0730)  BP: (!) 101/50 (08/25/24 0730)  SpO2: 97 % (08/25/24 0730) Vital Signs (24h Range):  Temp:  [98.3 °F (36.8 °C)] 98.3 °F (36.8 °C)  Pulse:  [112-124] 112  Resp:  [18-24] 19  SpO2:  [97 %-100 %] 97 %  BP: ()/(50-75) 101/50     Weight: 98.2 kg (216 lb 7.9 oz)  Body mass index is 36.03 kg/m².    Patient's last menstrual period was 08/05/2024.     Physical Exam:   Constitutional: She is oriented to person, place, and time. She appears well-developed.     Eyes: Pupils are equal, round, and reactive to light. Conjunctivae and EOM are normal.      Pulmonary/Chest: Effort normal.        Abdominal: Soft. She exhibits distension and abdominal incision (intact x 4). There is abdominal tenderness.   No bowel sounds             Musculoskeletal: Normal range of motion.       Neurological: She is alert and oriented to person, place, and time.    Skin: Skin is warm.    Psychiatric: She has a normal mood and affect.        Laboratory:  Recent Lab Results         08/25/24  0516   08/25/24  0338   08/25/24  0315        Albumin   2.5         ALP   73         ALT   7         Anion Gap   11         Appearance, UA Clear           AST   15         Bacteria, UA None           Baso #   0.03         Basophil %   0.2         Bilirubin (UA) Negative           BILIRUBIN TOTAL   0.6  Comment: For infants and newborns, interpretation of results should be based  on gestational age, weight and in agreement with clinical  observations.    Premature Infant recommended  reference ranges:  Up to 24 hours.............<8.0 mg/dL  Up to 48 hours............<12.0 mg/dL  3-5 days..................<15.0 mg/dL  6-29 days.................<15.0 mg/dL           BUN   21         Calcium   9.3         Chloride   104         CO2   23         Color, UA Yellow           Creatinine   1.0         Differential Method   Automated         eGFR   >60         Eos #   0.0         Eos %   0.1         Glucose   127         Glucose, UA Negative           Gran # (ANC)   12.2         Gran %   85.8         Group & Rh     O POS       Hematocrit   43.4         Hemoglobin   13.8         Hyaline Casts, UA 0           Immature Grans (Abs)   0.07  Comment: Mild elevation in immature granulocytes is non specific and   can be seen in a variety of conditions including stress response,   acute inflammation, trauma and pregnancy. Correlation with other   laboratory and clinical findings is essential.           Immature Granulocytes   0.5         INDIRECT KANU     NEG       Ketones, UA Negative           Lactic Acid Level   1.1  Comment: Falsely low lactic acid results can be found in samples   containing >=13.0 mg/dL total bilirubin and/or >=3.5 mg/dL   direct bilirubin.           Leukocyte Esterase, UA Negative           Lymph #   0.8         Lymph %   5.6         MCH   26.8         MCHC   31.8         MCV   84         Microscopic Comment SEE COMMENT  Comment: Other formed elements not mentioned in the report are not   present in the microscopic examination.              Mono #   1.1         Mono %   7.8         MPV   8.9         NITRITE UA Negative           nRBC   0         Blood, UA 3+           pH, UA 7.0           Platelet Count   438         Potassium   3.5         PROTEIN TOTAL   6.7         Protein, UA 2+  Comment: Recommend a 24 hour urine protein or a urine   protein/creatinine ratio if globulin induced proteinuria is  clinically suspected.             RBC   5.15         RBC, UA 0           RDW   13.2          Sodium   138         Spec Grav UA >1.030           Specimen Outdate     08/28/2024 23:59       Specimen UA Urine, Clean Catch           Squam Epithel, UA 13           UROBILINOGEN UA Negative           WBC, UA 0           WBC   14.21               I have personallly reviewed all pertinent lab results from the last 24 hours.    Diagnostic Results:  CT: Reviewed  Assessment/Plan:     Cardiac/Vascular  Hypertension  08/25/2024  Hold bp meds for now    Renal/  Uterine prolapse  08/25/2024  Pod #3 s/p ralhbso  Op report reviewed  Incisions healing well    GI  * Small bowel obstruction  08/25/2024  Continue npo for now  Ng tube to low output suction  General surgery consulted  If no improvement in clinical symtpoms in 24-48 hrs; , may need repeat ct scan or dx laparoscope        Tigist Mejia MD  Obstetrics & Gynecology  [facility] - Emergency Dept.

## 2024-08-25 NOTE — SUBJECTIVE & OBJECTIVE
No current facility-administered medications on file prior to encounter.     Current Outpatient Medications on File Prior to Encounter   Medication Sig    carvediloL (COREG) 12.5 MG tablet Take 12.5 mg by mouth 2 (two) times daily.    HYDROcodone-acetaminophen (NORCO) 5-325 mg per tablet Take 1 tablet by mouth every 6 (six) hours as needed for Pain.    olmesartan-hydrochlorothiazide (BENICAR HCT) 40-12.5 mg Tab Take 1 tablet by mouth once daily.       Review of patient's allergies indicates:   Allergen Reactions    Sulfa (sulfonamide antibiotics) Other (See Comments)    Sulfamethoxazole-trimethoprim     Trimethoprim Other (See Comments)       Past Medical History:   Diagnosis Date    Hypertension     Myocardial infarction      Past Surgical History:   Procedure Laterality Date    abdominalplasty      LIPOSUCTION  05/28/2024    back    REDUCTION OF BOTH BREASTS Bilateral 2015    REMOVAL OF INTRAUTERINE DEVICE (IUD) N/A 8/22/2024    Procedure: REMOVAL, INTRAUTERINE DEVICE;  Surgeon: CAT Dwyer MD;  Location: [facility] OR;  Service: OB/GYN;  Laterality: N/A;    TOTAL REDUCTION MAMMOPLASTY Bilateral 2015    tummy tuck  04/24/2024    XI ROBOTIC HYSTERECTOMY, WITH SALPINGO-OOPHORECTOMY Bilateral 8/22/2024    Procedure: XI ROBOTIC HYSTERECTOMY,WITH SALPINGO-OOPHORECTOMY;  Surgeon: CAT Dwyer MD;  Location: [facility] OR;  Service: OB/GYN;  Laterality: Bilateral;     Family History       Problem Relation (Age of Onset)    Hypertension Sister, Brother          Tobacco Use    Smoking status: Never    Smokeless tobacco: Never   Substance and Sexual Activity    Alcohol use: Not Currently    Drug use: Never    Sexual activity: Yes     Partners: Male     Birth control/protection: Implant     Review of Systems   Constitutional:  Negative for activity change, appetite change, chills, diaphoresis, fatigue and fever.   Respiratory:  Negative for chest tightness and shortness of breath.    Cardiovascular:  Negative for chest  pain and leg swelling.   Gastrointestinal:  Positive for abdominal distention, abdominal pain, constipation and nausea. Negative for diarrhea, rectal pain and vomiting.     Objective:     Vital Signs (Most Recent):  Temp: 98.3 °F (36.8 °C) (08/25/24 0304)  Pulse: (!) 118 (08/25/24 1100)  Resp: 20 (08/25/24 1100)  BP: (!) 118/57 (08/25/24 1100)  SpO2: 100 % (08/25/24 1100) Vital Signs (24h Range):  Temp:  [98.3 °F (36.8 °C)] 98.3 °F (36.8 °C)  Pulse:  [109-124] 118  Resp:  [16-24] 20  SpO2:  [97 %-100 %] 100 %  BP: ()/(46-75) 118/57     Weight: 98.2 kg (216 lb 7.9 oz)  Body mass index is 36.03 kg/m².     Physical Exam  Constitutional:       General: She is not in acute distress.     Appearance: Normal appearance. She is not ill-appearing or toxic-appearing.   HENT:      Head: Normocephalic and atraumatic.   Eyes:      General: No scleral icterus.     Extraocular Movements: Extraocular movements intact.      Conjunctiva/sclera: Conjunctivae normal.   Cardiovascular:      Rate and Rhythm: Normal rate and regular rhythm.   Pulmonary:      Effort: Pulmonary effort is normal.   Abdominal:      General: There is distension.      Palpations: Abdomen is soft.      Tenderness: There is no abdominal tenderness. There is no guarding or rebound.      Comments: Incisions c/d/I, with no erythema or induration    Skin:     General: Skin is warm and dry.      Coloration: Skin is not jaundiced.   Neurological:      General: No focal deficit present.      Mental Status: She is alert.   Psychiatric:         Mood and Affect: Mood normal.         Behavior: Behavior normal.         Thought Content: Thought content normal.            I have reviewed all pertinent lab results within the past 24 hours.  CBC:   Recent Labs   Lab 08/25/24  0338   WBC 14.21*   RBC 5.15   HGB 13.8   HCT 43.4      MCV 84   MCH 26.8*   MCHC 31.8*     BMP:   Recent Labs   Lab 08/25/24  0338   *      K 3.5      CO2 23   BUN 21*    CREATININE 1.0   CALCIUM 9.3     CMP:   Recent Labs   Lab 08/25/24  0338   *   CALCIUM 9.3   ALBUMIN 2.5*   PROT 6.7      K 3.5   CO2 23      BUN 21*   CREATININE 1.0   ALKPHOS 73   ALT 7*   AST 15   BILITOT 0.6     LFTs:   Recent Labs   Lab 08/25/24  0338   ALT 7*   AST 15   ALKPHOS 73   BILITOT 0.6   PROT 6.7   ALBUMIN 2.5*       Significant Diagnostics:  I have reviewed all pertinent imaging results/findings within the past 24 hours.  CT: I have reviewed all pertinent results/findings within the past 24 hours and my personal findings are:  some mildly dilated loops of small bowel, with some free fluid and scattered air.   Imaging Results              X-Ray Chest AP Portable (Final result)  Result time 08/25/24 07:02:33      Final result by Bernard Jeter MD (08/25/24 07:02:33)                   Impression:      1.  As above      Electronically signed by: Bernard Jeter MD  Date:    08/25/2024  Time:    07:02               Narrative:    EXAMINATION:  XR CHEST AP PORTABLE    CLINICAL HISTORY:  Encounter for other specified special examinations    COMPARISON:  August 19, 2024    FINDINGS:  Lower lung volumes on the current study.  EKG leads overlie the chest.  The lungs otherwise clear.  The hilar and mediastinal contours and osseous structures are unchanged.    There is a small amount of intraperitoneal free air, similar to the recent CT scan.  Interval placement of a nasogastric tube in good position.                                        CT Abdomen Pelvis With IV Contrast NO Oral Contrast (Final result)  Result time 08/25/24 06:25:12      Final result by Jose Torres MD (08/25/24 06:25:12)                   Impression:      Moderate generalized free air and free fluid. Hollow viscus perforation should be considered. This could also be sequela of surgery but this is slightly less favored.    Mildly dilated left-sided loops of small bowel which could reflect ileus or  obstruction.    This report was flagged in Epic as abnormal.    The preliminary and final reports are concordant.    Findings communicated to the ED physician via the overnight service at 04:06.    All CT scans at this facility are performed  using dose modulation techniques as appropriate to performed exam including the following:  automated exposure control; adjustment of mA and/or kV according to the patients size (this includes techniques or standardized protocols for targeted exams where dose is matched to indication/reason for exam: i.e. extremities or head);  iterative reconstruction technique.      Electronically signed by: Jose Torres  Date:    08/25/2024  Time:    06:25               Narrative:    EXAMINATION:  CT ABDOMEN PELVIS WITH IV CONTRAST    CLINICAL HISTORY:  Abdominal trauma, blunt;    TECHNIQUE:  Low dose axial images, sagittal and coronal reformations were obtained from the lung bases to the pubic symphysis.  Contrast was not administered.    COMPARISON:  None    FINDINGS:  Heart: Normal in size. No pericardial effusion.    Lung Bases: Well aerated, without consolidation or pleural fluid.    Liver: Normal in size and attenuation, with no focal hepatic lesions.    Gallbladder: No calcified gallstones.    Bile Ducts: No evidence of dilated ducts.    Pancreas: No mass or peripancreatic fat stranding.    Spleen: Unremarkable.    Adrenals: Unremarkable.    Kidneys/ Ureters: Unremarkable.    Bladder: No evidence of wall thickening.    Reproductive organs: Surgically absent    GI Tract/Mesentery: Mildly dilated left-sided loops of small bowel which could raise concern for developing ileus or obstruction but are somewhat nonspecific.    Peritoneal Space: Moderate generalized free air and free fluid.  Hollow viscus perforation should be considered.  This could also be sequela of surgery but this is slightly less favored.  No free air.    Retroperitoneum: No significant adenopathy.    Abdominal wall:  Unremarkable.    Vasculature: No significant atherosclerosis or aneurysm.    Bones: No acute fracture.

## 2024-08-25 NOTE — PHARMACY MED REC
"Admission Medication History     The home medication history was taken by Nargis Faria.    You may go to "Admission" then "Reconcile Home Medications" tabs to review and/or act upon these items.     The home medication list has been updated by the Pharmacy department.   Please read ALL comments highlighted in yellow.   Please address this information as you see fit.    Feel free to contact us if you have any questions or require assistance.        Medications listed below were obtained from: Patient/family and Analytic software- [employer]      [facility] REC COMPLETED:    Nargis Faria  [id number]      Current Outpatient Medications on File Prior to Encounter   Medication Sig Dispense Refill Last Dose    carvediloL (COREG) 12.5 MG tablet Take 12.5 mg by mouth 2 (two) times daily.   8/24/2024    HYDROcodone-acetaminophen (NORCO) 5-325 mg per tablet Take 1 tablet by mouth every 6 (six) hours as needed for Pain. 15 tablet 0 8/24/2024    olmesartan-hydrochlorothiazide (BENICAR HCT) 40-12.5 mg Tab Take 1 tablet by mouth once daily.   8/24/2024                           .          "

## 2024-08-25 NOTE — HPI
49 y/o s/p ralhbso for uterine prolapse presents to ER for worsening abdominal pain.  Patient reports actually passing out due to pain.  Feels she has been unable to pass gas.  Patient reports poor appetite due to pain.  Also feels her belly has become more distended.

## 2024-08-25 NOTE — CONSULTS
[facility] - Emergency Dept.  General Surgery  Consult Note    Patient Name: Estefany Berumen  MRN: [id number]  Code Status: Prior  Admission Date: 8/25/2024  Hospital Length of Stay: 0 days  Attending Physician: Tigist Mejia MD  Primary Care Provider: Lm Rdz MD    Patient information was obtained from patient, past medical records, ER records, and primary team.     Inpatient consult to General Surgery  Consult performed by: Paulina Bah MD  Consult ordered by: Tigist Mejia MD  Reason for consult: SBO vs ileus        Subjective:     Principal Problem: Small bowel obstruction    History of Present Illness: Estefany Berumen is a 48 y.o. female w/ history Robotic KRISTY/BSO on 8/22/2024 w/ Dr. Bari Dwyer on whom we were consulted for SBO vs ileus.  Pt presented to the ED last night c/o abdominal pain and distention and reported syncopal episode 2/2 pain.  She states that she has had consistent pain since surgery and has not had any flatus or bowel function since the surgery.  In the ED she was afebrile, tachy to 120s, and borderline hypotensive with BP in the 90s systolic.  Labs revealed a leukocytosis of 14K but were otherwise normal.  CT scan was done which showed moderate generalized free air and free fluid and mildly dilated left-sided loops of small bowel which could reflect ileus or obstruction.  She was admitted to gyn and we were consulted for assistance w/ SBO management.  NGT was placed in the ER and has had minimal output since then as far as I can tell.  Today she states she feels a little better and that her abdomen feels less distended.  She is hungry and wants to know when she can eat.        No current facility-administered medications on file prior to encounter.     Current Outpatient Medications on File Prior to Encounter   Medication Sig    carvediloL (COREG) 12.5 MG tablet Take 12.5 mg by mouth 2 (two) times daily.    HYDROcodone-acetaminophen (NORCO) 5-325 mg per tablet Take  1 tablet by mouth every 6 (six) hours as needed for Pain.    olmesartan-hydrochlorothiazide (BENICAR HCT) 40-12.5 mg Tab Take 1 tablet by mouth once daily.       Review of patient's allergies indicates:   Allergen Reactions    Sulfa (sulfonamide antibiotics) Other (See Comments)    Sulfamethoxazole-trimethoprim     Trimethoprim Other (See Comments)       Past Medical History:   Diagnosis Date    Hypertension     Myocardial infarction      Past Surgical History:   Procedure Laterality Date    abdominalplasty      LIPOSUCTION  05/28/2024    back    REDUCTION OF BOTH BREASTS Bilateral 2015    REMOVAL OF INTRAUTERINE DEVICE (IUD) N/A 8/22/2024    Procedure: REMOVAL, INTRAUTERINE DEVICE;  Surgeon: CAT Dwyer MD;  Location: [facility] OR;  Service: OB/GYN;  Laterality: N/A;    TOTAL REDUCTION MAMMOPLASTY Bilateral 2015    tummy tuck  04/24/2024    XI ROBOTIC HYSTERECTOMY, WITH SALPINGO-OOPHORECTOMY Bilateral 8/22/2024    Procedure: XI ROBOTIC HYSTERECTOMY,WITH SALPINGO-OOPHORECTOMY;  Surgeon: CAT Dwyer MD;  Location: [facility] OR;  Service: OB/GYN;  Laterality: Bilateral;     Family History       Problem Relation (Age of Onset)    Hypertension Sister, Brother          Tobacco Use    Smoking status: Never    Smokeless tobacco: Never   Substance and Sexual Activity    Alcohol use: Not Currently    Drug use: Never    Sexual activity: Yes     Partners: Male     Birth control/protection: Implant     Review of Systems   Constitutional:  Negative for activity change, appetite change, chills, diaphoresis, fatigue and fever.   Respiratory:  Negative for chest tightness and shortness of breath.    Cardiovascular:  Negative for chest pain and leg swelling.   Gastrointestinal:  Positive for abdominal distention, abdominal pain, constipation and nausea. Negative for diarrhea, rectal pain and vomiting.     Objective:     Vital Signs (Most Recent):  Temp: 98.3 °F (36.8 °C) (08/25/24 0304)  Pulse: (!) 118 (08/25/24 1100)  Resp:  20 (08/25/24 1100)  BP: (!) 118/57 (08/25/24 1100)  SpO2: 100 % (08/25/24 1100) Vital Signs (24h Range):  Temp:  [98.3 °F (36.8 °C)] 98.3 °F (36.8 °C)  Pulse:  [109-124] 118  Resp:  [16-24] 20  SpO2:  [97 %-100 %] 100 %  BP: ()/(46-75) 118/57     Weight: 98.2 kg (216 lb 7.9 oz)  Body mass index is 36.03 kg/m².     Physical Exam  Constitutional:       General: She is not in acute distress.     Appearance: Normal appearance. She is not ill-appearing or toxic-appearing.   HENT:      Head: Normocephalic and atraumatic.   Eyes:      General: No scleral icterus.     Extraocular Movements: Extraocular movements intact.      Conjunctiva/sclera: Conjunctivae normal.   Cardiovascular:      Rate and Rhythm: Normal rate and regular rhythm.   Pulmonary:      Effort: Pulmonary effort is normal.   Abdominal:      General: There is distension.      Palpations: Abdomen is soft.      Tenderness: There is no abdominal tenderness. There is no guarding or rebound.      Comments: Incisions c/d/I, with no erythema or induration    Skin:     General: Skin is warm and dry.      Coloration: Skin is not jaundiced.   Neurological:      General: No focal deficit present.      Mental Status: She is alert.   Psychiatric:         Mood and Affect: Mood normal.         Behavior: Behavior normal.         Thought Content: Thought content normal.            I have reviewed all pertinent lab results within the past 24 hours.  CBC:   Recent Labs   Lab 08/25/24 0338   WBC 14.21*   RBC 5.15   HGB 13.8   HCT 43.4      MCV 84   MCH 26.8*   MCHC 31.8*     BMP:   Recent Labs   Lab 08/25/24  0338   *      K 3.5      CO2 23   BUN 21*   CREATININE 1.0   CALCIUM 9.3     CMP:   Recent Labs   Lab 08/25/24 0338   *   CALCIUM 9.3   ALBUMIN 2.5*   PROT 6.7      K 3.5   CO2 23      BUN 21*   CREATININE 1.0   ALKPHOS 73   ALT 7*   AST 15   BILITOT 0.6     LFTs:   Recent Labs   Lab 08/25/24  0338   ALT 7*   AST 15    ALKPHOS 73   BILITOT 0.6   PROT 6.7   ALBUMIN 2.5*       Significant Diagnostics:  I have reviewed all pertinent imaging results/findings within the past 24 hours.  CT: I have reviewed all pertinent results/findings within the past 24 hours and my personal findings are:  some mildly dilated loops of small bowel, with some free fluid and scattered air.   Imaging Results              X-Ray Chest AP Portable (Final result)  Result time 08/25/24 07:02:33      Final result by Bernard Jeter MD (08/25/24 07:02:33)                   Impression:      1.  As above      Electronically signed by: Bernard Jeter MD  Date:    08/25/2024  Time:    07:02               Narrative:    EXAMINATION:  XR CHEST AP PORTABLE    CLINICAL HISTORY:  Encounter for other specified special examinations    COMPARISON:  August 19, 2024    FINDINGS:  Lower lung volumes on the current study.  EKG leads overlie the chest.  The lungs otherwise clear.  The hilar and mediastinal contours and osseous structures are unchanged.    There is a small amount of intraperitoneal free air, similar to the recent CT scan.  Interval placement of a nasogastric tube in good position.                                        CT Abdomen Pelvis With IV Contrast NO Oral Contrast (Final result)  Result time 08/25/24 06:25:12      Final result by Jose Torres MD (08/25/24 06:25:12)                   Impression:      Moderate generalized free air and free fluid. Hollow viscus perforation should be considered. This could also be sequela of surgery but this is slightly less favored.    Mildly dilated left-sided loops of small bowel which could reflect ileus or obstruction.    This report was flagged in Epic as abnormal.    The preliminary and final reports are concordant.    Findings communicated to the ED physician via the overnight service at 04:06.    All CT scans at this facility are performed  using dose modulation techniques as appropriate to performed exam  including the following:  automated exposure control; adjustment of mA and/or kV according to the patients size (this includes techniques or standardized protocols for targeted exams where dose is matched to indication/reason for exam: i.e. extremities or head);  iterative reconstruction technique.      Electronically signed by: Jose Torres  Date:    08/25/2024  Time:    06:25               Narrative:    EXAMINATION:  CT ABDOMEN PELVIS WITH IV CONTRAST    CLINICAL HISTORY:  Abdominal trauma, blunt;    TECHNIQUE:  Low dose axial images, sagittal and coronal reformations were obtained from the lung bases to the pubic symphysis.  Contrast was not administered.    COMPARISON:  None    FINDINGS:  Heart: Normal in size. No pericardial effusion.    Lung Bases: Well aerated, without consolidation or pleural fluid.    Liver: Normal in size and attenuation, with no focal hepatic lesions.    Gallbladder: No calcified gallstones.    Bile Ducts: No evidence of dilated ducts.    Pancreas: No mass or peripancreatic fat stranding.    Spleen: Unremarkable.    Adrenals: Unremarkable.    Kidneys/ Ureters: Unremarkable.    Bladder: No evidence of wall thickening.    Reproductive organs: Surgically absent    GI Tract/Mesentery: Mildly dilated left-sided loops of small bowel which could raise concern for developing ileus or obstruction but are somewhat nonspecific.    Peritoneal Space: Moderate generalized free air and free fluid.  Hollow viscus perforation should be considered.  This could also be sequela of surgery but this is slightly less favored.  No free air.    Retroperitoneum: No significant adenopathy.    Abdominal wall: Unremarkable.    Vasculature: No significant atherosclerosis or aneurysm.    Bones: No acute fracture.                                        Assessment/Plan:     * Small bowel obstruction  Based on her symptoms and the imaging I suspect this is more likely to be a postoperative ileus than a true bowel  obstruction.  Her bowel was only mildly dilated on the CT scan.  Regardless she would benefit from NG tube decompression and bowel rest.  I discussed with the patient the management of bowel obstructions and ileus all of which involved time and an ambulation.  Her abdomen is not tender and is only mildly distended.    -- no acute surgical intervention  -- agree with NG tube decompression  -- awaiting return of bowel function  -- can consider small-bowel follow-through tomorrow  -- repeat KUB in the AM   -- remainder of care as per primary team    Uterine prolapse  -- Management as per primary team     Hypertension  -- management as per primary team       VTE Risk Mitigation (From admission, onward)      None            Thank you for your consult. I will follow-up with patient. Please contact us if you have any additional questions.    Paulina Bah MD  General Surgery  [facility] - Emergency Dept.

## 2024-08-25 NOTE — ASSESSMENT & PLAN NOTE
08/25/2024  Continue npo for now  Ng tube to low output suction  General surgery consulted  If no improvement in clinical symtpoms in 24-48 hrs; , may need repeat ct scan or dx laparoscope

## 2024-08-25 NOTE — ED PROVIDER NOTES
SCRIBE #1 NOTE: I, Lynn Hutton, am scribing for, and in the presence of, Ace Gilliam MD. I have scribed the entire note.       History     Chief Complaint   Patient presents with    Abdominal Pain     Pt arrives to ED via [facility] for abdominal pain and SOB s/p hysterectomy on Thursday. Abdomen swollen and hard with palpation. Reports she had a syncopal episode and subsequent ground level fall yesterday morning and pain/swelling has significantly increased since then.     Review of patient's allergies indicates:   Allergen Reactions    Sulfa (sulfonamide antibiotics) Other (See Comments)    Sulfamethoxazole-trimethoprim     Trimethoprim Other (See Comments)         History of Present Illness     HPI    8/25/2024, 3:16 AM  History obtained from the patient      History of Present Illness: Esteafny Berumen is a 48 y.o. female patient with a PMHx of HTN, hydronephrosis, and MI who presents to the Emergency Department for evaluation of abdominal pain and SOB which onset gradually today. Pt had a hysterectomy on Thursday. Yesterday morning, pt became dizzy and fell. Pt states that she has been experiencing abdominal pain since the surgery, but it has significantly increased since her fall yesterday morning. PTA, pt became SOB and called 911. No mitigating or exacerbating factors reported. Associated sxs include vaginal bleeding, dizziness, and abdominal distention. Patient denies any fever, CP, dysuria, and all other sxs at this time. No prior Tx reported. No further complaints or concerns at this time.       Arrival mode:  [facility]    PCP: Lm Rdz MD        Past Medical History:  Past Medical History:   Diagnosis Date    Hypertension     Myocardial infarction        Past Surgical History:  Past Surgical History:   Procedure Laterality Date    abdominalplasty      LIPOSUCTION  05/28/2024    back    REDUCTION OF BOTH BREASTS Bilateral 2015    REMOVAL OF INTRAUTERINE DEVICE (IUD) N/A 8/22/2024    Procedure:  REMOVAL, INTRAUTERINE DEVICE;  Surgeon: CAT Dwyer MD;  Location: [facility] OR;  Service: OB/GYN;  Laterality: N/A;    TOTAL REDUCTION MAMMOPLASTY Bilateral 2015    tummy tuck  04/24/2024    XI ROBOTIC HYSTERECTOMY, WITH SALPINGO-OOPHORECTOMY Bilateral 8/22/2024    Procedure: XI ROBOTIC HYSTERECTOMY,WITH SALPINGO-OOPHORECTOMY;  Surgeon: CAT Dwyer MD;  Location: [facility] OR;  Service: OB/GYN;  Laterality: Bilateral;         Family History:  Family History   Problem Relation Name Age of Onset    Hypertension Sister      Hypertension Brother         Social History:  Social History     Tobacco Use    Smoking status: Never    Smokeless tobacco: Never   Substance and Sexual Activity    Alcohol use: Not Currently    Drug use: Never    Sexual activity: Yes     Partners: Male     Birth control/protection: Implant        Review of Systems     Review of Systems   Constitutional:  Negative for fever.   HENT:  Negative for sore throat.    Respiratory:  Positive for shortness of breath.    Cardiovascular:  Negative for chest pain.   Gastrointestinal:  Positive for abdominal distention and abdominal pain. Negative for nausea.   Genitourinary:  Positive for vaginal bleeding. Negative for dysuria.   Musculoskeletal:  Negative for back pain.   Skin:  Negative for rash.   Neurological:  Positive for dizziness. Negative for weakness.   Hematological:  Does not bruise/bleed easily.      Physical Exam     Initial Vitals [08/25/24 0304]   BP Pulse Resp Temp SpO2   121/73 (!) 124 (!) 22 98.3 °F (36.8 °C) 98 %      MAP       --          Physical Exam  Nursing Notes and Vital Signs Reviewed.  Constitutional: Patient is in no acute distress. Well-developed and well-nourished.  Head: Atraumatic. Normocephalic.  Eyes: PERRL. EOM intact. Conjunctivae are not pale. No scleral icterus.  ENT: Mucous membranes are moist. Oropharynx is clear and symmetric.    Neck: Supple. Full ROM. No lymphadenopathy.  Cardiovascular: Tachycardic.  "Regular rhythm. No murmurs, rubs, or gallops. Distal pulses are 2+ and symmetric.  Pulmonary/Chest: No respiratory distress. Clear to auscultation bilaterally. No wheezing or rales.  Abdominal: There is generalized tenderness and distention.  No rebound, guarding, or rigidity. Good bowel sounds.  Genitourinary: No CVA tenderness  Musculoskeletal: Moves all extremities. No obvious deformities. No edema. No calf tenderness.  Skin: Warm and dry.  Neurological:  Alert, awake, and appropriate.  Normal speech.  No acute focal neurological deficits are appreciated.  Psychiatric: Normal affect. Good eye contact. Appropriate in content.     ED Course   Procedures  ED Vital Signs:  Vitals:    08/25/24 0304 08/25/24 0314 08/25/24 0404 08/25/24 0432   BP: 121/73 133/75 118/73 (!) 114/59   Pulse: (!) 124 (!) 124 (!) 112 (!) 113   Resp: (!) 22 (!) 24 18 19   Temp: 98.3 °F (36.8 °C)      TempSrc: Oral      SpO2: 98% 97% 98% 97%   Weight: 98.2 kg (216 lb 7.9 oz)      Height: 5' 5" (1.651 m)          Abnormal Lab Results:  Labs Reviewed   CBC W/ AUTO DIFFERENTIAL - Abnormal       Result Value    WBC 14.21 (*)     RBC 5.15      Hemoglobin 13.8      Hematocrit 43.4      MCV 84      MCH 26.8 (*)     MCHC 31.8 (*)     RDW 13.2      Platelets 438      MPV 8.9 (*)     Immature Granulocytes 0.5      Gran # (ANC) 12.2 (*)     Immature Grans (Abs) 0.07 (*)     Lymph # 0.8 (*)     Mono # 1.1 (*)     Eos # 0.0      Baso # 0.03      nRBC 0      Gran % 85.8 (*)     Lymph % 5.6 (*)     Mono % 7.8      Eosinophil % 0.1      Basophil % 0.2      Differential Method Automated     COMPREHENSIVE METABOLIC PANEL - Abnormal    Sodium 138      Potassium 3.5      Chloride 104      CO2 23      Glucose 127 (*)     BUN 21 (*)     Creatinine 1.0      Calcium 9.3      Total Protein 6.7      Albumin 2.5 (*)     Total Bilirubin 0.6      Alkaline Phosphatase 73      AST 15      ALT 7 (*)     eGFR >60      Anion Gap 11     URINALYSIS, REFLEX TO URINE CULTURE - " Abnormal    Specimen UA Urine, Clean Catch      Color, UA Yellow      Appearance, UA Clear      pH, UA 7.0      Specific Gravity, UA >1.030 (*)     Protein, UA 2+ (*)     Glucose, UA Negative      Ketones, UA Negative      Bilirubin (UA) Negative      Occult Blood UA 3+ (*)     Nitrite, UA Negative      Urobilinogen, UA Negative      Leukocytes, UA Negative      Narrative:     Specimen Source->Urine   LACTIC ACID, PLASMA    Lactate (Lactic Acid) 1.1     URINALYSIS MICROSCOPIC    RBC, UA 0      WBC, UA 0      Bacteria None      Squam Epithel, UA 13      Hyaline Casts, UA 0      Microscopic Comment SEE COMMENT      Narrative:     Specimen Source->Urine   TYPE & SCREEN    Group & Rh O POS      Indirect Gurpreet NEG      Specimen Outdate 08/28/2024 23:59          All Lab Results:  Results for orders placed or performed during the hospital encounter of 08/25/24   CBC auto differential   Result Value Ref Range    WBC 14.21 (H) 3.90 - 12.70 K/uL    RBC 5.15 4.00 - 5.40 M/uL    Hemoglobin 13.8 12.0 - 16.0 g/dL    Hematocrit 43.4 37.0 - 48.5 %    MCV 84 82 - 98 fL    MCH 26.8 (L) 27.0 - 31.0 pg    MCHC 31.8 (L) 32.0 - 36.0 g/dL    RDW 13.2 11.5 - 14.5 %    Platelets 438 150 - 450 K/uL    MPV 8.9 (L) 9.2 - 12.9 fL    Immature Granulocytes 0.5 0.0 - 0.5 %    Gran # (ANC) 12.2 (H) 1.8 - 7.7 K/uL    Immature Grans (Abs) 0.07 (H) 0.00 - 0.04 K/uL    Lymph # 0.8 (L) 1.0 - 4.8 K/uL    Mono # 1.1 (H) 0.3 - 1.0 K/uL    Eos # 0.0 0.0 - 0.5 K/uL    Baso # 0.03 0.00 - 0.20 K/uL    nRBC 0 0 /100 WBC    Gran % 85.8 (H) 38.0 - 73.0 %    Lymph % 5.6 (L) 18.0 - 48.0 %    Mono % 7.8 4.0 - 15.0 %    Eosinophil % 0.1 0.0 - 8.0 %    Basophil % 0.2 0.0 - 1.9 %    Differential Method Automated    Comprehensive metabolic panel   Result Value Ref Range    Sodium 138 136 - 145 mmol/L    Potassium 3.5 3.5 - 5.1 mmol/L    Chloride 104 95 - 110 mmol/L    CO2 23 23 - 29 mmol/L    Glucose 127 (H) 70 - 110 mg/dL    BUN 21 (H) 6 - 20 mg/dL    Creatinine 1.0  0.5 - 1.4 mg/dL    Calcium 9.3 8.7 - 10.5 mg/dL    Total Protein 6.7 6.0 - 8.4 g/dL    Albumin 2.5 (L) 3.5 - 5.2 g/dL    Total Bilirubin 0.6 0.1 - 1.0 mg/dL    Alkaline Phosphatase 73 55 - 135 U/L    AST 15 10 - 40 U/L    ALT 7 (L) 10 - 44 U/L    eGFR >60 >60 mL/min/1.73 m^2    Anion Gap 11 8 - 16 mmol/L   Lactic acid, plasma   Result Value Ref Range    Lactate (Lactic Acid) 1.1 0.5 - 2.2 mmol/L   Urinalysis, Reflex to Urine Culture Urine, Clean Catch    Specimen: Urine   Result Value Ref Range    Specimen UA Urine, Clean Catch     Color, UA Yellow Yellow, Straw, [place]    Appearance, UA Clear Clear    pH, UA 7.0 5.0 - 8.0    Specific Gravity, UA >1.030 (A) 1.005 - 1.030    Protein, UA 2+ (A) Negative    Glucose, UA Negative Negative    Ketones, UA Negative Negative    Bilirubin (UA) Negative Negative    Occult Blood UA 3+ (A) Negative    Nitrite, UA Negative Negative    Urobilinogen, UA Negative <2.0 EU/dL    Leukocytes, UA Negative Negative   Urinalysis Microscopic   Result Value Ref Range    RBC, UA 0 0 - 4 /hpf    WBC, UA 0 0 - 5 /hpf    Bacteria None None-Occ /hpf    Squam Epithel, UA 13 /hpf    Hyaline Casts, UA 0 0-1/lpf /lpf    Microscopic Comment SEE COMMENT    Type & Screen   Result Value Ref Range    Group & Rh O POS     Indirect Gurpreet NEG     Specimen Outdate 08/28/2024 23:59         Imaging Results:  Imaging Results              CT Abdomen Pelvis With IV Contrast NO Oral Contrast (In process)                 CT Abdomen and Pelvis With Intravenous Contrast  Radiologist: Torrie Valverde MD  STATRAD Impression:  Small pneumoperitoneum and small ascites all 4 quadrants. Correlate clinicxally for recent surgery versus perforated viscus.  Minimally to moderately dilated small bowel mid left abdomen could represent ileus or SBO.           The Emergency Provider reviewed the vital signs and test results, which are outlined above.     ED Discussion       4:34 AM: Discussed case with Dr. Mejia (OB/GYN). Dr. Mejia  agrees with current care and management of pt and accepts admission.   Admitting Service: OB/GYN  Admitting Physician: Dr. Mejia  Admit to: med/surg        Medical Decision Making  Amount and/or Complexity of Data Reviewed  Labs: ordered. Decision-making details documented in ED Course.  Radiology: ordered. Decision-making details documented in ED Course.    Risk  Prescription drug management.  Decision regarding hospitalization.                ED Medication(s):  Medications   morphine injection 4 mg (has no administration in time range)   ondansetron injection 4 mg (has no administration in time range)   iohexoL (OMNIPAQUE 350) injection 100 mL (100 mLs Intravenous Given 8/25/24 0333)       New Prescriptions    No medications on file               Scribe Attestation:   Scribe #1: I performed the above scribed service and the documentation accurately describes the services I performed. I attest to the accuracy of the note.     Attending:   Physician Attestation Statement for Scribe #1: I, Ace Gilliam MD, personally performed the services described in this documentation, as scribed by Lynn Hutton, in my presence, and it is both accurate and complete.       Scribe Attestation:   Scribe #1: I performed the above scribed service and the documentation accurately describes the services I performed. I attest to the accuracy of the note.         Clinical Impression       ICD-10-CM ICD-9-CM   1. SBO (small bowel obstruction)  K56.609 560.9   2. S/P hysterectomy  Z90.710 V88.01       Disposition:   Disposition: Admitted  Condition: Fair        Ace Gilliam MD  08/25/24 0602

## 2024-08-25 NOTE — ASSESSMENT & PLAN NOTE
Based on her symptoms and the imaging I suspect this is more likely to be a postoperative ileus than a true bowel obstruction.  Her bowel was only mildly dilated on the CT scan.  Regardless she would benefit from NG tube decompression and bowel rest.  I discussed with the patient the management of bowel obstructions and ileus all of which involved time and an ambulation.  Her abdomen is not tender and is only mildly distended.    -- no acute surgical intervention  -- agree with NG tube decompression  -- awaiting return of bowel function  -- can consider small-bowel follow-through tomorrow  -- repeat KUB in the AM   -- remainder of care as per primary team

## 2024-08-25 NOTE — PLAN OF CARE
Received to room 312 via stretcher  from ED. Transferred to bed per self. Ambulated to bathroom. NG tube to low intermittent suction, clear brown liquid out. Oriented to room/unit. Call bell in reach. NPO. No s/s acute distress.

## 2024-08-25 NOTE — HPI
Estefany Berumen is a 48 y.o. female w/ history Robotic KRISTY/BSO on 8/22/2024 w/ Dr. Bari Dwyer on whom we were consulted for SBO vs ileus.  Pt presented to the ED last night c/o abdominal pain and distention and reported syncopal episode 2/2 pain.  She states that she has had consistent pain since surgery and has not had any flatus or bowel function since the surgery.  In the ED she was afebrile, tachy to 120s, and borderline hypotensive with BP in the 90s systolic.  Labs revealed a leukocytosis of 14K but were otherwise normal.  CT scan was done which showed moderate generalized free air and free fluid and mildly dilated left-sided loops of small bowel which could reflect ileus or obstruction.  She was admitted to gyn and we were consulted for assistance w/ SBO management.  NGT was placed in the ER and has had minimal output since then as far as I can tell.  Today she states she feels a little better and that her abdomen feels less distended.  She is hungry and wants to know when she can eat.

## 2024-08-25 NOTE — SUBJECTIVE & OBJECTIVE
OB History    Para Term  AB Living   6 1 1 0 5 1   SAB IAB Ectopic Multiple Live Births   5 0 0 0 1      # Outcome Date GA Lbr Dev/2nd Weight Sex Type Anes PTL Lv   6 SAB            5 SAB            4 SAB            3 SAB            2 SAB            1 Term      Vag-Spont   GERMAN     Past Medical History:   Diagnosis Date    Hypertension     Myocardial infarction      Past Surgical History:   Procedure Laterality Date    abdominalplasty      LIPOSUCTION  2024    back    REDUCTION OF BOTH BREASTS Bilateral     REMOVAL OF INTRAUTERINE DEVICE (IUD) N/A 2024    Procedure: REMOVAL, INTRAUTERINE DEVICE;  Surgeon: CAT Dwyer MD;  Location: [facility] OR;  Service: OB/GYN;  Laterality: N/A;    TOTAL REDUCTION MAMMOPLASTY Bilateral     tummy tuck  2024    XI ROBOTIC HYSTERECTOMY, WITH SALPINGO-OOPHORECTOMY Bilateral 2024    Procedure: XI ROBOTIC HYSTERECTOMY,WITH SALPINGO-OOPHORECTOMY;  Surgeon: CAT Dwyer MD;  Location: [facility] OR;  Service: OB/GYN;  Laterality: Bilateral;       (Not in a hospital admission)      Review of patient's allergies indicates:   Allergen Reactions    Sulfa (sulfonamide antibiotics) Other (See Comments)    Sulfamethoxazole-trimethoprim     Trimethoprim Other (See Comments)        Family History       Problem Relation (Age of Onset)    Hypertension Sister, Brother          Tobacco Use    Smoking status: Never    Smokeless tobacco: Never   Substance and Sexual Activity    Alcohol use: Not Currently    Drug use: Never    Sexual activity: Yes     Partners: Male     Birth control/protection: Implant     Review of Systems   Gastrointestinal:  Positive for abdominal pain.   All other systems reviewed and are negative.     Objective:     Vital Signs (Most Recent):  Temp: 98.3 °F (36.8 °C) (24 0304)  Pulse: (!) 112 (24 07)  Resp: 19 (24)  BP: (!) 101/50 (24 0730)  SpO2: 97 % (24) Vital Signs (24h Range):  Temp:   [98.3 °F (36.8 °C)] 98.3 °F (36.8 °C)  Pulse:  [112-124] 112  Resp:  [18-24] 19  SpO2:  [97 %-100 %] 97 %  BP: ()/(50-75) 101/50     Weight: 98.2 kg (216 lb 7.9 oz)  Body mass index is 36.03 kg/m².    Patient's last menstrual period was 08/05/2024.     Physical Exam:   Constitutional: She is oriented to person, place, and time. She appears well-developed.     Eyes: Pupils are equal, round, and reactive to light. Conjunctivae and EOM are normal.      Pulmonary/Chest: Effort normal.        Abdominal: Soft. She exhibits distension and abdominal incision (intact x 4). There is abdominal tenderness.   No bowel sounds             Musculoskeletal: Normal range of motion.       Neurological: She is alert and oriented to person, place, and time.    Skin: Skin is warm.    Psychiatric: She has a normal mood and affect.        Laboratory:  Recent Lab Results         08/25/24  0516   08/25/24  0338   08/25/24  0315        Albumin   2.5         ALP   73         ALT   7         Anion Gap   11         Appearance, UA Clear           AST   15         Bacteria, UA None           Baso #   0.03         Basophil %   0.2         Bilirubin (UA) Negative           BILIRUBIN TOTAL   0.6  Comment: For infants and newborns, interpretation of results should be based  on gestational age, weight and in agreement with clinical  observations.    Premature Infant recommended reference ranges:  Up to 24 hours.............<8.0 mg/dL  Up to 48 hours............<12.0 mg/dL  3-5 days..................<15.0 mg/dL  6-29 days.................<15.0 mg/dL           BUN   21         Calcium   9.3         Chloride   104         CO2   23         Color, UA Yellow           Creatinine   1.0         Differential Method   Automated         eGFR   >60         Eos #   0.0         Eos %   0.1         Glucose   127         Glucose, UA Negative           Gran # (ANC)   12.2         Gran %   85.8         Group & Rh     O POS       Hematocrit   43.4          Hemoglobin   13.8         Hyaline Casts, UA 0           Immature Grans (Abs)   0.07  Comment: Mild elevation in immature granulocytes is non specific and   can be seen in a variety of conditions including stress response,   acute inflammation, trauma and pregnancy. Correlation with other   laboratory and clinical findings is essential.           Immature Granulocytes   0.5         INDIRECT KANU     NEG       Ketones, UA Negative           Lactic Acid Level   1.1  Comment: Falsely low lactic acid results can be found in samples   containing >=13.0 mg/dL total bilirubin and/or >=3.5 mg/dL   direct bilirubin.           Leukocyte Esterase, UA Negative           Lymph #   0.8         Lymph %   5.6         MCH   26.8         MCHC   31.8         MCV   84         Microscopic Comment SEE COMMENT  Comment: Other formed elements not mentioned in the report are not   present in the microscopic examination.              Mono #   1.1         Mono %   7.8         MPV   8.9         NITRITE UA Negative           nRBC   0         Blood, UA 3+           pH, UA 7.0           Platelet Count   438         Potassium   3.5         PROTEIN TOTAL   6.7         Protein, UA 2+  Comment: Recommend a 24 hour urine protein or a urine   protein/creatinine ratio if globulin induced proteinuria is  clinically suspected.             RBC   5.15         RBC, UA 0           RDW   13.2         Sodium   138         Spec Grav UA >1.030           Specimen Outdate     08/28/2024 23:59       Specimen UA Urine, Clean Catch           Squam Epithel, UA 13           UROBILINOGEN UA Negative           WBC, UA 0           WBC   14.21               I have personallly reviewed all pertinent lab results from the last 24 hours.    Diagnostic Results:  CT: Reviewed

## 2024-08-26 ENCOUNTER — ANESTHESIA (OUTPATIENT)
Dept: SURGERY | Facility: HOSPITAL | Age: 49
End: 2024-08-26
Payer: COMMERCIAL

## 2024-08-26 ENCOUNTER — ANESTHESIA EVENT (OUTPATIENT)
Dept: SURGERY | Facility: HOSPITAL | Age: 49
End: 2024-08-26
Payer: COMMERCIAL

## 2024-08-26 PROBLEM — K56.7 ILEUS, POSTOPERATIVE: Status: ACTIVE | Noted: 2024-08-25

## 2024-08-26 PROBLEM — K56.600 PARTIAL SMALL BOWEL OBSTRUCTION: Status: ACTIVE | Noted: 2024-08-25

## 2024-08-26 PROBLEM — K91.89 ILEUS, POSTOPERATIVE: Status: ACTIVE | Noted: 2024-08-25

## 2024-08-26 LAB
ALBUMIN SERPL BCP-MCNC: 2.1 G/DL (ref 3.5–5.2)
ALP SERPL-CCNC: 75 U/L (ref 55–135)
ALT SERPL W/O P-5'-P-CCNC: 11 U/L (ref 10–44)
ANION GAP SERPL CALC-SCNC: 8 MMOL/L (ref 8–16)
AST SERPL-CCNC: 14 U/L (ref 10–40)
BASOPHILS # BLD AUTO: 0.04 K/UL (ref 0–0.2)
BASOPHILS NFR BLD: 0.3 % (ref 0–1.9)
BILIRUB SERPL-MCNC: 0.6 MG/DL (ref 0.1–1)
BUN SERPL-MCNC: 13 MG/DL (ref 6–20)
CALCIUM SERPL-MCNC: 8.5 MG/DL (ref 8.7–10.5)
CHLORIDE SERPL-SCNC: 108 MMOL/L (ref 95–110)
CO2 SERPL-SCNC: 26 MMOL/L (ref 23–29)
CREAT SERPL-MCNC: 0.9 MG/DL (ref 0.5–1.4)
DIFFERENTIAL METHOD BLD: ABNORMAL
EOSINOPHIL # BLD AUTO: 0.1 K/UL (ref 0–0.5)
EOSINOPHIL NFR BLD: 0.8 % (ref 0–8)
ERYTHROCYTE [DISTWIDTH] IN BLOOD BY AUTOMATED COUNT: 13.5 % (ref 11.5–14.5)
EST. GFR  (NO RACE VARIABLE): >60 ML/MIN/1.73 M^2
GLUCOSE SERPL-MCNC: 136 MG/DL (ref 70–110)
HCT VFR BLD AUTO: 37.2 % (ref 37–48.5)
HGB BLD-MCNC: 11.7 G/DL (ref 12–16)
IMM GRANULOCYTES # BLD AUTO: 0.11 K/UL (ref 0–0.04)
IMM GRANULOCYTES NFR BLD AUTO: 0.8 % (ref 0–0.5)
LYMPHOCYTES # BLD AUTO: 0.8 K/UL (ref 1–4.8)
LYMPHOCYTES NFR BLD: 6.2 % (ref 18–48)
MCH RBC QN AUTO: 26.8 PG (ref 27–31)
MCHC RBC AUTO-ENTMCNC: 31.5 G/DL (ref 32–36)
MCV RBC AUTO: 85 FL (ref 82–98)
MONOCYTES # BLD AUTO: 1.3 K/UL (ref 0.3–1)
MONOCYTES NFR BLD: 9.9 % (ref 4–15)
NEUTROPHILS # BLD AUTO: 11.1 K/UL (ref 1.8–7.7)
NEUTROPHILS NFR BLD: 82 % (ref 38–73)
NRBC BLD-RTO: 0 /100 WBC
PLATELET # BLD AUTO: 435 K/UL (ref 150–450)
PMV BLD AUTO: 9 FL (ref 9.2–12.9)
POTASSIUM SERPL-SCNC: 3.4 MMOL/L (ref 3.5–5.1)
PROT SERPL-MCNC: 6 G/DL (ref 6–8.4)
RBC # BLD AUTO: 4.36 M/UL (ref 4–5.4)
SODIUM SERPL-SCNC: 142 MMOL/L (ref 136–145)
WBC # BLD AUTO: 13.48 K/UL (ref 3.9–12.7)

## 2024-08-26 PROCEDURE — 63600175 PHARM REV CODE 636 W HCPCS: Performed by: STUDENT IN AN ORGANIZED HEALTH CARE EDUCATION/TRAINING PROGRAM

## 2024-08-26 PROCEDURE — 0DJD4ZZ INSPECTION OF LOWER INTESTINAL TRACT, PERCUTANEOUS ENDOSCOPIC APPROACH: ICD-10-PCS | Performed by: OBSTETRICS & GYNECOLOGY

## 2024-08-26 PROCEDURE — 25500020 PHARM REV CODE 255: Performed by: OBSTETRICS & GYNECOLOGY

## 2024-08-26 PROCEDURE — 88307 TISSUE EXAM BY PATHOLOGIST: CPT | Mod: 26,,, | Performed by: PATHOLOGY

## 2024-08-26 PROCEDURE — 36415 COLL VENOUS BLD VENIPUNCTURE: CPT | Performed by: OBSTETRICS & GYNECOLOGY

## 2024-08-26 PROCEDURE — 37000008 HC ANESTHESIA 1ST 15 MINUTES: Performed by: OBSTETRICS & GYNECOLOGY

## 2024-08-26 PROCEDURE — 0DBB0ZZ EXCISION OF ILEUM, OPEN APPROACH: ICD-10-PCS | Performed by: OBSTETRICS & GYNECOLOGY

## 2024-08-26 PROCEDURE — 36000709 HC OR TIME LEV III EA ADD 15 MIN: Performed by: OBSTETRICS & GYNECOLOGY

## 2024-08-26 PROCEDURE — 11000001 HC ACUTE MED/SURG PRIVATE ROOM

## 2024-08-26 PROCEDURE — 85025 COMPLETE CBC W/AUTO DIFF WBC: CPT | Performed by: OBSTETRICS & GYNECOLOGY

## 2024-08-26 PROCEDURE — 25000003 PHARM REV CODE 250: Performed by: OBSTETRICS & GYNECOLOGY

## 2024-08-26 PROCEDURE — 25000003 PHARM REV CODE 250: Performed by: STUDENT IN AN ORGANIZED HEALTH CARE EDUCATION/TRAINING PROGRAM

## 2024-08-26 PROCEDURE — 63600175 PHARM REV CODE 636 W HCPCS: Performed by: OBSTETRICS & GYNECOLOGY

## 2024-08-26 PROCEDURE — 63600175 PHARM REV CODE 636 W HCPCS: Performed by: ANESTHESIOLOGY

## 2024-08-26 PROCEDURE — A9698 NON-RAD CONTRAST MATERIALNOC: HCPCS | Performed by: OBSTETRICS & GYNECOLOGY

## 2024-08-26 PROCEDURE — 27201423 OPTIME MED/SURG SUP & DEVICES STERILE SUPPLY: Performed by: OBSTETRICS & GYNECOLOGY

## 2024-08-26 PROCEDURE — 99232 SBSQ HOSP IP/OBS MODERATE 35: CPT | Mod: ,,, | Performed by: OBSTETRICS & GYNECOLOGY

## 2024-08-26 PROCEDURE — 99232 SBSQ HOSP IP/OBS MODERATE 35: CPT | Mod: ,,, | Performed by: SURGERY

## 2024-08-26 PROCEDURE — 63600175 PHARM REV CODE 636 W HCPCS: Mod: JZ,JG | Performed by: STUDENT IN AN ORGANIZED HEALTH CARE EDUCATION/TRAINING PROGRAM

## 2024-08-26 PROCEDURE — 37000009 HC ANESTHESIA EA ADD 15 MINS: Performed by: OBSTETRICS & GYNECOLOGY

## 2024-08-26 PROCEDURE — C9290 INJ, BUPIVACAINE LIPOSOME: HCPCS | Performed by: STUDENT IN AN ORGANIZED HEALTH CARE EDUCATION/TRAINING PROGRAM

## 2024-08-26 PROCEDURE — 36000708 HC OR TIME LEV III 1ST 15 MIN: Performed by: OBSTETRICS & GYNECOLOGY

## 2024-08-26 PROCEDURE — V2790 AMNIOTIC MEMBRANE: HCPCS | Performed by: OBSTETRICS & GYNECOLOGY

## 2024-08-26 PROCEDURE — 88307 TISSUE EXAM BY PATHOLOGIST: CPT | Performed by: PATHOLOGY

## 2024-08-26 PROCEDURE — 71000033 HC RECOVERY, INTIAL HOUR: Performed by: OBSTETRICS & GYNECOLOGY

## 2024-08-26 PROCEDURE — 80053 COMPREHEN METABOLIC PANEL: CPT | Performed by: OBSTETRICS & GYNECOLOGY

## 2024-08-26 DEVICE — IMPLANTABLE DEVICE: Type: IMPLANTABLE DEVICE | Site: ABDOMEN | Status: FUNCTIONAL

## 2024-08-26 RX ORDER — KETOROLAC TROMETHAMINE 30 MG/ML
INJECTION, SOLUTION INTRAMUSCULAR; INTRAVENOUS
Status: DISCONTINUED | OUTPATIENT
Start: 2024-08-26 | End: 2024-08-26

## 2024-08-26 RX ORDER — ONDANSETRON HYDROCHLORIDE 2 MG/ML
4 INJECTION, SOLUTION INTRAVENOUS EVERY 6 HOURS PRN
Status: DISCONTINUED | OUTPATIENT
Start: 2024-08-26 | End: 2024-08-30 | Stop reason: HOSPADM

## 2024-08-26 RX ORDER — MIDAZOLAM HYDROCHLORIDE 1 MG/ML
INJECTION INTRAMUSCULAR; INTRAVENOUS
Status: DISCONTINUED | OUTPATIENT
Start: 2024-08-26 | End: 2024-08-26

## 2024-08-26 RX ORDER — NALOXONE HCL 0.4 MG/ML
0.02 VIAL (ML) INJECTION
Status: DISCONTINUED | OUTPATIENT
Start: 2024-08-26 | End: 2024-08-30 | Stop reason: HOSPADM

## 2024-08-26 RX ORDER — KETOROLAC TROMETHAMINE 30 MG/ML
15 INJECTION, SOLUTION INTRAMUSCULAR; INTRAVENOUS EVERY 8 HOURS PRN
Status: DISCONTINUED | OUTPATIENT
Start: 2024-08-26 | End: 2024-08-26 | Stop reason: HOSPADM

## 2024-08-26 RX ORDER — ONDANSETRON HYDROCHLORIDE 2 MG/ML
INJECTION, SOLUTION INTRAVENOUS
Status: DISCONTINUED | OUTPATIENT
Start: 2024-08-26 | End: 2024-08-26

## 2024-08-26 RX ORDER — HYDROMORPHONE HYDROCHLORIDE 2 MG/ML
0.2 INJECTION, SOLUTION INTRAMUSCULAR; INTRAVENOUS; SUBCUTANEOUS EVERY 5 MIN PRN
Status: DISCONTINUED | OUTPATIENT
Start: 2024-08-26 | End: 2024-08-26 | Stop reason: HOSPADM

## 2024-08-26 RX ORDER — ACETAMINOPHEN 10 MG/ML
INJECTION, SOLUTION INTRAVENOUS
Status: DISCONTINUED | OUTPATIENT
Start: 2024-08-26 | End: 2024-08-26

## 2024-08-26 RX ORDER — MUPIROCIN 20 MG/G
OINTMENT TOPICAL 2 TIMES DAILY
Status: DISCONTINUED | OUTPATIENT
Start: 2024-08-26 | End: 2024-08-30 | Stop reason: HOSPADM

## 2024-08-26 RX ORDER — ROCURONIUM BROMIDE 10 MG/ML
INJECTION, SOLUTION INTRAVENOUS
Status: DISCONTINUED | OUTPATIENT
Start: 2024-08-26 | End: 2024-08-26

## 2024-08-26 RX ORDER — BUPIVACAINE HYDROCHLORIDE 2.5 MG/ML
INJECTION, SOLUTION EPIDURAL; INFILTRATION; INTRACAUDAL
Status: DISCONTINUED | OUTPATIENT
Start: 2024-08-26 | End: 2024-08-26 | Stop reason: HOSPADM

## 2024-08-26 RX ORDER — DEXAMETHASONE SODIUM PHOSPHATE 4 MG/ML
INJECTION, SOLUTION INTRA-ARTICULAR; INTRALESIONAL; INTRAMUSCULAR; INTRAVENOUS; SOFT TISSUE
Status: DISCONTINUED | OUTPATIENT
Start: 2024-08-26 | End: 2024-08-26

## 2024-08-26 RX ORDER — OXYCODONE AND ACETAMINOPHEN 5; 325 MG/1; MG/1
1 TABLET ORAL
Status: DISCONTINUED | OUTPATIENT
Start: 2024-08-26 | End: 2024-08-26

## 2024-08-26 RX ORDER — METRONIDAZOLE 500 MG/100ML
500 INJECTION, SOLUTION INTRAVENOUS EVERY 8 HOURS
Status: DISCONTINUED | OUTPATIENT
Start: 2024-08-26 | End: 2024-08-27

## 2024-08-26 RX ORDER — SODIUM CHLORIDE 9 MG/ML
INJECTION, SOLUTION INTRAVENOUS
Status: DISCONTINUED | OUTPATIENT
Start: 2024-08-26 | End: 2024-08-30 | Stop reason: HOSPADM

## 2024-08-26 RX ORDER — FENTANYL CITRATE 50 UG/ML
INJECTION, SOLUTION INTRAMUSCULAR; INTRAVENOUS
Status: DISCONTINUED | OUTPATIENT
Start: 2024-08-26 | End: 2024-08-26

## 2024-08-26 RX ORDER — ONDANSETRON HYDROCHLORIDE 2 MG/ML
4 INJECTION, SOLUTION INTRAVENOUS DAILY PRN
Status: DISCONTINUED | OUTPATIENT
Start: 2024-08-26 | End: 2024-08-26 | Stop reason: HOSPADM

## 2024-08-26 RX ORDER — PROPOFOL 10 MG/ML
VIAL (ML) INTRAVENOUS
Status: DISCONTINUED | OUTPATIENT
Start: 2024-08-26 | End: 2024-08-26

## 2024-08-26 RX ORDER — KETAMINE HCL IN 0.9 % NACL 50 MG/5 ML
SYRINGE (ML) INTRAVENOUS
Status: DISCONTINUED | OUTPATIENT
Start: 2024-08-26 | End: 2024-08-26

## 2024-08-26 RX ORDER — SUCCINYLCHOLINE CHLORIDE 20 MG/ML
INJECTION INTRAMUSCULAR; INTRAVENOUS
Status: DISCONTINUED | OUTPATIENT
Start: 2024-08-26 | End: 2024-08-26

## 2024-08-26 RX ORDER — BISACODYL 10 MG/1
10 SUPPOSITORY RECTAL DAILY PRN
Status: DISCONTINUED | OUTPATIENT
Start: 2024-08-26 | End: 2024-08-26

## 2024-08-26 RX ORDER — GLUCAGON 1 MG
1 KIT INJECTION
Status: DISCONTINUED | OUTPATIENT
Start: 2024-08-26 | End: 2024-08-26 | Stop reason: HOSPADM

## 2024-08-26 RX ORDER — HYDROMORPHONE HCL IN 0.9% NACL 6 MG/30 ML
PATIENT CONTROLLED ANALGESIA SYRINGE INTRAVENOUS CONTINUOUS
Status: DISCONTINUED | OUTPATIENT
Start: 2024-08-26 | End: 2024-08-27

## 2024-08-26 RX ORDER — LIDOCAINE HYDROCHLORIDE 20 MG/ML
INJECTION INTRAVENOUS
Status: DISCONTINUED | OUTPATIENT
Start: 2024-08-26 | End: 2024-08-26

## 2024-08-26 RX ADMIN — IOHEXOL 1000 ML: 12 SOLUTION ORAL at 10:08

## 2024-08-26 RX ADMIN — HYDROMORPHONE HYDROCHLORIDE 0.2 MG: 2 INJECTION INTRAMUSCULAR; INTRAVENOUS; SUBCUTANEOUS at 08:08

## 2024-08-26 RX ADMIN — METRONIDAZOLE 500 MG: 500 INJECTION, SOLUTION INTRAVENOUS at 10:08

## 2024-08-26 RX ADMIN — ROCURONIUM BROMIDE 5 MG: 10 SOLUTION INTRAVENOUS at 05:08

## 2024-08-26 RX ADMIN — SUCCINYLCHOLINE CHLORIDE 140 MG: 20 INJECTION, SOLUTION INTRAMUSCULAR; INTRAVENOUS; PARENTERAL at 05:08

## 2024-08-26 RX ADMIN — FENTANYL CITRATE 50 MCG: 50 INJECTION, SOLUTION INTRAMUSCULAR; INTRAVENOUS at 06:08

## 2024-08-26 RX ADMIN — FENTANYL CITRATE 50 MCG: 50 INJECTION, SOLUTION INTRAMUSCULAR; INTRAVENOUS at 05:08

## 2024-08-26 RX ADMIN — BISACODYL 10 MG: 10 SUPPOSITORY RECTAL at 01:08

## 2024-08-26 RX ADMIN — Medication 25 MG: at 06:08

## 2024-08-26 RX ADMIN — SODIUM CHLORIDE, SODIUM LACTATE, POTASSIUM CHLORIDE, AND CALCIUM CHLORIDE: .6; .31; .03; .02 INJECTION, SOLUTION INTRAVENOUS at 05:08

## 2024-08-26 RX ADMIN — MORPHINE SULFATE 2 MG: 4 INJECTION INTRAVENOUS at 01:08

## 2024-08-26 RX ADMIN — ACETAMINOPHEN 1000 MG: 10 INJECTION, SOLUTION INTRAVENOUS at 07:08

## 2024-08-26 RX ADMIN — PIPERACILLIN SODIUM AND TAZOBACTAM SODIUM 4.5 G: 4; .5 INJECTION, POWDER, FOR SOLUTION INTRAVENOUS at 10:08

## 2024-08-26 RX ADMIN — MIDAZOLAM HYDROCHLORIDE 2 MG: 1 INJECTION, SOLUTION INTRAMUSCULAR; INTRAVENOUS at 05:08

## 2024-08-26 RX ADMIN — Medication: at 08:08

## 2024-08-26 RX ADMIN — DEXTROSE AND SODIUM CHLORIDE: 5; 900 INJECTION, SOLUTION INTRAVENOUS at 08:08

## 2024-08-26 RX ADMIN — SUGAMMADEX 200 MG: 100 INJECTION, SOLUTION INTRAVENOUS at 07:08

## 2024-08-26 RX ADMIN — ONDANSETRON 4 MG: 2 INJECTION INTRAMUSCULAR; INTRAVENOUS at 11:08

## 2024-08-26 RX ADMIN — ROCURONIUM BROMIDE 45 MG: 10 SOLUTION INTRAVENOUS at 05:08

## 2024-08-26 RX ADMIN — Medication 25 MG: at 05:08

## 2024-08-26 RX ADMIN — DEXAMETHASONE SODIUM PHOSPHATE 8 MG: 4 INJECTION, SOLUTION INTRA-ARTICULAR; INTRALESIONAL; INTRAMUSCULAR; INTRAVENOUS; SOFT TISSUE at 06:08

## 2024-08-26 RX ADMIN — ONDANSETRON 4 MG: 2 INJECTION INTRAMUSCULAR; INTRAVENOUS at 06:08

## 2024-08-26 RX ADMIN — LIDOCAINE HYDROCHLORIDE 100 MG: 20 INJECTION INTRAVENOUS at 05:08

## 2024-08-26 RX ADMIN — METRONIDAZOLE 500 MG: 500 INJECTION, SOLUTION INTRAVENOUS at 01:08

## 2024-08-26 RX ADMIN — KETOROLAC TROMETHAMINE 30 MG: 30 INJECTION, SOLUTION INTRAMUSCULAR; INTRAVENOUS at 07:08

## 2024-08-26 RX ADMIN — ROCURONIUM BROMIDE 20 MG: 10 SOLUTION INTRAVENOUS at 06:08

## 2024-08-26 RX ADMIN — PROPOFOL 150 MG: 10 INJECTION, EMULSION INTRAVENOUS at 05:08

## 2024-08-26 RX ADMIN — DEXTROSE AND SODIUM CHLORIDE: 5; 900 INJECTION, SOLUTION INTRAVENOUS at 09:08

## 2024-08-26 RX ADMIN — ONDANSETRON 4 MG: 2 INJECTION INTRAMUSCULAR; INTRAVENOUS at 01:08

## 2024-08-26 NOTE — ASSESSMENT & PLAN NOTE
CT results reviewed.  Continue NPO for now and continue IV fluids.  Appreciate input from General Surgery  Patient states she will leave the hospital AMA if NG tube has to be continued as the tube is causing significant pain to her nose and throat.  Discussed risks vs benefits with her and advised the NG tube can be removed, but may possibly need to be replaced if N/V persists.    Patient also advised of possible need for laparoscopy if symptoms don't improve.

## 2024-08-26 NOTE — SUBJECTIVE & OBJECTIVE
Interval History: afebrile. Mild tachycardia.  BP stable.  Passing flatus.     Medications:  Continuous Infusions:   D5 and 0.9% NaCl   Intravenous Continuous 100 mL/hr at 08/25/24 2119 New Bag at 08/25/24 2119     Scheduled Meds:  PRN Meds:  Current Facility-Administered Medications:     acetaminophen, 975 mg, Per NG tube, Q8H PRN    butamben-TETRAcaine-benzocaine 2%-2%-14% (200 mg/sec), 1 spray, Topical (Top), Q4H PRN    morphine, 2 mg, Intravenous, Q4H PRN    ondansetron, 4 mg, Intravenous, Q6H PRN     Review of patient's allergies indicates:   Allergen Reactions    Sulfa (sulfonamide antibiotics) Other (See Comments)    Sulfamethoxazole-trimethoprim     Trimethoprim Other (See Comments)     Objective:     Vital Signs (Most Recent):  Temp: 99.2 °F (37.3 °C) (08/26/24 0752)  Pulse: (!) 113 (08/26/24 0752)  Resp: 16 (08/26/24 0752)  BP: 124/70 (08/26/24 0752)  SpO2: 100 % (08/26/24 0752) Vital Signs (24h Range):  Temp:  [98 °F (36.7 °C)-100.5 °F (38.1 °C)] 99.2 °F (37.3 °C)  Pulse:  [109-125] 113  Resp:  [16-20] 16  SpO2:  [95 %-100 %] 100 %  BP: ()/(51-87) 124/70     Weight: 98.4 kg (216 lb 14.9 oz)  Body mass index is 36.1 kg/m².    Intake/Output - Last 3 Shifts         08/24 0700 08/25 0659 08/25 0700 08/26 0659 08/26 0700 08/27 0659    I.V. (mL/kg)  555.8 (5.6)     Total Intake(mL/kg)  555.8 (5.6)     Drains  300     Total Output  300     Net  +255.8                     Physical Exam  Constitutional:       General: She is not in acute distress.     Appearance: Normal appearance. She is not ill-appearing or toxic-appearing.   HENT:      Head: Normocephalic and atraumatic.   Eyes:      General: No scleral icterus.     Extraocular Movements: Extraocular movements intact.      Conjunctiva/sclera: Conjunctivae normal.   Cardiovascular:      Rate and Rhythm: Normal rate and regular rhythm.   Pulmonary:      Effort: Pulmonary effort is normal.   Abdominal:      General: There is distension.      Palpations:  Abdomen is soft.      Tenderness: There is abdominal tenderness (mildly). There is no guarding or rebound.      Comments: Incisions c/d/I, with no erythema or induration, no peritonitis or rebound   Skin:     General: Skin is warm and dry.      Coloration: Skin is not jaundiced.   Neurological:      General: No focal deficit present.      Mental Status: She is alert.   Psychiatric:         Mood and Affect: Mood normal.         Behavior: Behavior normal.         Thought Content: Thought content normal.          Significant Labs:  I have reviewed all pertinent lab results within the past 24 hours.  CBC:   Recent Labs   Lab 08/26/24  0610   WBC 13.48*   RBC 4.36   HGB 11.7*   HCT 37.2      MCV 85   MCH 26.8*   MCHC 31.5*     BMP:   Recent Labs   Lab 08/26/24  0610   *      K 3.4*      CO2 26   BUN 13   CREATININE 0.9   CALCIUM 8.5*       Significant Diagnostics:  I have reviewed all pertinent imaging results/findings within the past 24 hours.

## 2024-08-26 NOTE — ASSESSMENT & PLAN NOTE
Passing flatus,  minimal NGT output.  Unclear etiology for persistent leukocytosis and low grade fevers- if high index of suspicion for missed injury would recommend diagnostic laparoscopy, otherwise recommend SBFT to eval passage of contrast vs CT scan with PO contrast and would have a low threshold for diagnostic laparoscopy.      -- recommend CT scan with PO contrast   -- remainder of care as per primary team

## 2024-08-26 NOTE — ANESTHESIA PROCEDURE NOTES
Intubation    Date/Time: 8/26/2024 5:50 PM    Performed by: Jerson Reeves CRNA  Authorized by: Darci Thomson II, MD    Intubation:     Induction:  Rapid sequence induction    Intubated:  Postinduction    Mask Ventilation:  N/a    Attempts:  1    Attempted By:  CRNA    Method of Intubation:  Direct    Blade:  Jihan 3    Laryngeal View Grade: Grade I - full view of cords      Difficult Airway Encountered?: No      Complications:  None    Airway Device:  Oral endotracheal tube    Airway Device Size:  7.5    Style/Cuff Inflation:  Cuffed (inflated to minimal occlusive pressure)    Tube secured:  21    Secured at:  The lips    Placement Verified By:  Capnometry    Complicating Factors:  Obesity and short neck    Findings Post-Intubation:  BS equal bilateral and atraumatic/condition of teeth unchanged

## 2024-08-26 NOTE — ANESTHESIA PREPROCEDURE EVALUATION
08/26/2024  Estefany Berumen is a 48 y.o., female.49 y/o s/p ralhbso for uterine prolapse now with SBO    Patient Active Problem List   Diagnosis    Hypertension    Uterine prolapse    Status post laparoscopic hysterectomy    Partial small bowel obstruction     Past Surgical History:   Procedure Laterality Date    abdominalplasty      LIPOSUCTION  05/28/2024    back    REDUCTION OF BOTH BREASTS Bilateral 2015    REMOVAL OF INTRAUTERINE DEVICE (IUD) N/A 8/22/2024    Procedure: REMOVAL, INTRAUTERINE DEVICE;  Surgeon: CAT Dwyer MD;  Location: [facility] OR;  Service: OB/GYN;  Laterality: N/A;    TOTAL REDUCTION MAMMOPLASTY Bilateral 2015    tummy tuck  04/24/2024    XI ROBOTIC HYSTERECTOMY, WITH SALPINGO-OOPHORECTOMY Bilateral 8/22/2024    Procedure: XI ROBOTIC HYSTERECTOMY,WITH SALPINGO-OOPHORECTOMY;  Surgeon: CAT Dwyer MD;  Location: [facility] OR;  Service: OB/GYN;  Laterality: Bilateral;       Pre-op Assessment    I have reviewed the Patient Summary Reports.    I have reviewed the NPO Status.   I have reviewed the Medications.     Review of Systems  Anesthesia Hx:  No problems with previous Anesthesia                Social:  Non-Smoker       Hematology/Oncology:  Hematology Normal                                     Cardiovascular:     Hypertension  Past MI                                         Pulmonary:  Pulmonary Normal                       Renal/:  Renal/ Normal                 Hepatic/GI:  Hepatic/GI Normal                 Neurological:  Neurology Normal                                      Endocrine:  Endocrine Normal                Physical Exam  General: Well nourished    Airway:  Mallampati: II   Mouth Opening: Normal  TM Distance: Normal  Neck ROM: Normal ROM    Dental:  Intact    Abdomen:  Tenderness        Anesthesia Plan  Type of Anesthesia, risks & benefits  discussed:    Anesthesia Type: Gen ETT  Intra-op Monitoring Plan: Standard ASA Monitors  Post Op Pain Control Plan: multimodal analgesia  Induction:  IV and rapid sequence  Airway Plan: , Post-Induction  Informed Consent: Informed consent signed with the Patient and all parties understand the risks and agree with anesthesia plan.  All questions answered.   ASA Score: 2 Emergent    Ready For Surgery From Anesthesia Perspective.     .    Chemistry        Component Value Date/Time     08/26/2024 0610    K 3.4 (L) 08/26/2024 0610     08/26/2024 0610    CO2 26 08/26/2024 0610    BUN 13 08/26/2024 0610    CREATININE 0.9 08/26/2024 0610     (H) 08/26/2024 0610        Component Value Date/Time    CALCIUM 8.5 (L) 08/26/2024 0610    ALKPHOS 75 08/26/2024 0610    AST 14 08/26/2024 0610    ALT 11 08/26/2024 0610    BILITOT 0.6 08/26/2024 0610    EGFRNONAA 105 11/11/2020 1345        Lab Results   Component Value Date    WBC 13.48 (H) 08/26/2024    HGB 11.7 (L) 08/26/2024    HCT 37.2 08/26/2024    MCV 85 08/26/2024     08/26/2024

## 2024-08-26 NOTE — SUBJECTIVE & OBJECTIVE
Interval History: Patient reports mild improvement, but is still having lower abdominal pain.  However, her main complaint is discomfort from her NG tube and she requests removal of it.    Scheduled Meds:   metroNIDAZOLE IV (PEDS and ADULTS)  500 mg Intravenous Q8H    piperacillin-tazobactam (Zosyn) IV (PEDS and ADULTS) (extended infusion is not appropriate)  4.5 g Intravenous Q8H    sodium chloride 0.9%  500 mL Intravenous Once     Continuous Infusions:   D5 and 0.9% NaCl   Intravenous Continuous 100 mL/hr at 08/26/24 0905 New Bag at 08/26/24 0905     PRN Meds:  Current Facility-Administered Medications:     acetaminophen, 975 mg, Per NG tube, Q8H PRN    bisacodyL, 10 mg, Rectal, Daily PRN    butamben-TETRAcaine-benzocaine 2%-2%-14% (200 mg/sec), 1 spray, Topical (Top), Q4H PRN    iohexoL, 100 mL, Intravenous, ONCE PRN    morphine, 2 mg, Intravenous, Q4H PRN    ondansetron, 4 mg, Intravenous, Q6H PRN    Review of patient's allergies indicates:   Allergen Reactions    Sulfa (sulfonamide antibiotics) Other (See Comments)    Sulfamethoxazole-trimethoprim     Trimethoprim Other (See Comments)       Objective:     Vital Signs (Most Recent):  Temp: 99.2 °F (37.3 °C) (08/26/24 0803)  Pulse: (!) 121 (08/26/24 1153)  Resp: (!) 22 (08/26/24 1153)  BP: (!) 147/84 (08/26/24 1153)  SpO2: 99 % (08/26/24 1153) Vital Signs (24h Range):  Temp:  [98 °F (36.7 °C)-100.5 °F (38.1 °C)] 99.2 °F (37.3 °C)  Pulse:  [111-125] 121  Resp:  [16-22] 22  SpO2:  [95 %-100 %] 99 %  BP: (105-155)/(60-87) 147/84     Weight: 98.4 kg (216 lb 14.9 oz)  Body mass index is 36.1 kg/m².  Patient's last menstrual period was 08/05/2024.    I&O (Last 24H):    Intake/Output Summary (Last 24 hours) at 8/26/2024 1315  Last data filed at 8/26/2024 0626  Gross per 24 hour   Intake 555.84 ml   Output 300 ml   Net 255.84 ml         Laboratory:  BMP:   Recent Labs   Lab 08/26/24  0610   *      K 3.4*      CO2 26   BUN 13   CREATININE 0.9   CALCIUM  8.5*     CBC:   Recent Labs   Lab 08/26/24  0610   WBC 13.48*   RBC 4.36   HGB 11.7*   HCT 37.2      MCV 85   MCH 26.8*   MCHC 31.5*       Diagnostic Results:  EXAMINATION:  CT ABDOMEN PELVIS WITH IV CONTRAST     CLINICAL HISTORY:  Bowel obstruction suspected;     TECHNIQUE:  Axial CT images were obtained of the abdomen and pelvis following IV contrast administration and was oral contrast.  Sagittal and coronal reformats obtained.     COMPARISON:  Abdomen and pelvis CT 08/25/2024     FINDINGS:  ABDOMEN:     - Lung bases: Lungs bases are clear.     - Liver: The liver appears normal.     - Gallbladder: Contracted hyperdensities suggesting vicarious excretion of contrast.     - Bile Ducts: No evidence of intra or extra hepatic biliary ductal dilation.     - Spleen: The spleen appears normal.     - Kidneys: Kidneys appear normal.     - Adrenals: Normal adrenals.     - Pancreas: Pancreas appears normal.     - Bowel: Scattered distended loops of proximal to mid small bowel is decompressed distal loops.  Maximum diameter proximal small bowel approximately 3.9 cm.  Scattered small amount of abdominopelvic ascites appears similar.  There is also pneumoperitoneum with scattered foci of air throughout the right abdominal mesentery and some air in the upper subphrenic portions of the abdomen.  Findings appear similar.     - Retroperitoneum:  Unremarkable.     - Vascular: No abdominal aortic aneurysm. No significant atherosclerosis.     - Abdominal wall:  Unremarkable.     PELVIS:     - Bladder: Collapsed.     - : Hysterectomy.     BONES:  No acute osseous abnormality and no significant arthritic changes.     Impression:     Fairly stable exam.     -Partial small bowel obstruction at the level of the mid small bowel.  Nasogastric tube now present.  The tip of the nasogastric tube is barely within the stomach and tube needs to be advanced several cm.     -Scattered small amount of abdominopelvic ascites as well as  scattered pneumoperitoneum with epicenter in the right abdomen.  Certainly viscus perforation is a consideration though findings could be postsurgical with recent KRISTY/BSO.     -negative for abscess     All CT scans at this facility are performed  using dose modulation techniques as appropriate to performed exam including the following:  automated exposure control; adjustment of mA and/or kV according to the patients size (this includes techniques or standardized protocols for targeted exams where dose is matched to indication/reason for exam: i.e. extremities or head);  iterative reconstruction technique.        Electronically signed by:Que Melo MD  Date:                                            08/26/2024  Time:                                           13:17           Exam Ended: 08/26/24 10:35 CDT Last Resulted: 08/26/24 13:17 CDT              Physical Exam:   Constitutional: She is oriented to person, place, and time. She appears well-developed and well-nourished. No distress.       Cardiovascular:  Normal rate.             Pulmonary/Chest: Effort normal.        Abdominal: Soft. She exhibits distension.   Decreased bowel sounds.  Mild, diffuse tenderness.             Musculoskeletal: No tenderness or edema.       Neurological: She is alert and oriented to person, place, and time.     Psychiatric: She has a normal mood and affect.

## 2024-08-26 NOTE — PROGRESS NOTES
"Patient now reports worsening abdominal pain and distension, as well as recurrence of N/V since NG tube removed.      BP (!) 147/84   Pulse (!) 133   Temp 99.2 °F (37.3 °C)   Resp (!) 22   Ht 5' 5" (1.651 m)   Wt 98.4 kg (216 lb 14.9 oz)   LMP 08/05/2024   SpO2 99%   Breastfeeding No   BMI 36.10 kg/m²      PE - Gen - in acute pain          Abd - marked distension, increased from exam earlier today.  Positive tenderness with rebound and guarding.  Decreased bowel sounds.    Labs reviewed.  CT reviewed.    A/P:  Discussed case with Dr. Morrow (surgery), who recommends proceeding with diagnostic laparoscopy, due to overall worsening of patient's clinical status and concern for possible bowel injury.  Risks, benefits, and alternatives of procedure discussed with patient and with her sister.  Risk of sepsis and other complications explained.  Discussed possible need for open procedure, as well as possible bowel resection and /or ostomy.  Patient states understanding.  All questions answered.  Patient agrees to proceed with surgery at this time.  Consent form for procedure reviewed with patient and signed by her.      "

## 2024-08-26 NOTE — ANESTHESIA PROCEDURE NOTES
Central Line    Diagnosis: IV access/hypotension  Patient location during procedure: done in OR  Procedure Urgency: Routine  Procedure start time: 8/26/2024 5:59 PM  Timeout: 8/26/2024 5:58 PM  Procedure end time: 8/26/2024 6:10 PM      Staffing  Authorizing Provider: Darci Thomson II, MD  Performing Provider: Darci Thomson II, MD    Staffing  Performed: anesthesiologist   Anesthesiologist: Darci Thomson II, MD  Performed by: Darci Thomson II, MD  Authorized by: Darci Thomson II, MD    Anesthesiologist was present at the time of the procedure.  Preanesthetic Checklist  Completed: patient identified, IV checked, site marked, risks and benefits discussed, surgical consent, monitors and equipment checked, pre-op evaluation, timeout performed and anesthesia consent given  Indication   Indication: hemodynamic monitoring, vascular access, med administration     Anesthesia   general anesthesia    Central Line   Skin Prep: skin prepped with ChloraPrep, skin prep agent completely dried prior to procedure  Sterile Barriers Followed: Yes    All five maximal barriers used- gloves, gown, cap, mask, and large sterile sheet    hand hygiene performed prior to central venous catheter insertion  Location: left internal jugular.   Catheter type: triple lumen  Catheter Size: 7 Fr  Inserted Catheter Length: 16 cm  Ultrasound: vascular probe with ultrasound   Vessel Caliber: medium, patent  Vascular Doppler:  not done, compressibility normal  Needle advanced into vessel with real time Ultrasound guidance.  Guidewire confirmed in vessel.  Image recorded and saved.  sterile gel and probe cover used in ultrasound-guided central venous catheter insertion   Manometry: Venous cannualation confirmed by visual estimation of blood vessel pressure using manometry.  Insertion Attempts: 1   Securement:line sutured, chlorhexidine patch, sterile dressing applied and blood return through all ports    Post-Procedure    Adverse  Events:none      Guidewire Guidewire removed intact.

## 2024-08-26 NOTE — PLAN OF CARE
[facility] - Med Surg 3  Initial Discharge Assessment       Primary Care Provider: Lm Rdz MD    Admission Diagnosis: SBO (small bowel obstruction) [K56.609]  S/P hysterectomy [Z90.710]  Encounter for imaging study to confirm nasogastric (NG) tube placement [Z01.89]    Admission Date: 8/25/2024  Expected Discharge Date:     Transition of Care Barriers: None    Payor: BLUE CROSS BLUE SHIELD / Plan: [facility] / Product Type: Commercial /     Extended Emergency Contact Information  Primary Emergency Contact: Nita Monzon  Mobile Phone: [phone]  Relation: Mother  Secondary Emergency Contact: Claudia Vasques  Mobile Phone: [phone]  Relation: Relative    Discharge Plan A: Home  Discharge Plan B: Home Health      [id number] - [place]  [address]  [address] [facility] [place]  [place] [address]  Phone: [phone] Fax: [phone]      Initial Assessment (most recent)       Adult Discharge Assessment - 08/26/24 1046          Discharge Assessment    Assessment Type Discharge Planning Assessment     Confirmed/corrected address, phone number and insurance Yes     Confirmed Demographics Correct on Facesheet     Source of Information patient     Communicated CHAGO with patient/caregiver Date not available/Unable to determine     People in Home child(jayden), dependent     Do you expect to return to your current living situation? Yes     Do you have help at home or someone to help you manage your care at home? Yes     Prior to hospitilization cognitive status: Alert/Oriented     Current cognitive status: Alert/Oriented     Walking or Climbing Stairs Difficulty no     Dressing/Bathing Difficulty no     Equipment Currently Used at Home none     Readmission within 30 days? No     Patient currently being followed by outpatient case management? No     Do you currently have service(s) that help you manage your care at home? No     Do you take prescription medications? Yes      Do you have prescription coverage? Yes     Do you have any problems affording any of your prescribed medications? No     Is the patient taking medications as prescribed? yes     How do you get to doctors appointments? car, drives self     Are you on dialysis? No     Do you take coumadin? No     Discharge Plan A Home     Discharge Plan B Home Health     DME Needed Upon Discharge  none     Discharge Plan discussed with: Patient     Transition of Care Barriers None                      No anticipated needs

## 2024-08-26 NOTE — PLAN OF CARE
Problem: Adult Inpatient Plan of Care  Goal: Plan of Care Review  Outcome: Progressing  Goal: Patient-Specific Goal (Individualized)  Outcome: Progressing  Goal: Absence of Hospital-Acquired Illness or Injury  Outcome: Progressing  Goal: Optimal Comfort and Wellbeing  Outcome: Progressing  Goal: Readiness for Transition of Care  Outcome: Progressing     Problem: Wound  Goal: Optimal Coping  Outcome: Progressing  Goal: Optimal Functional Ability  Outcome: Progressing  Goal: Absence of Infection Signs and Symptoms  Outcome: Progressing  Goal: Improved Oral Intake  Outcome: Progressing  Goal: Optimal Pain Control and Function  Outcome: Progressing  Goal: Skin Health and Integrity  Outcome: Progressing  Goal: Optimal Wound Healing  Outcome: Progressing     Problem: Intestinal Obstruction  Goal: Optimal Bowel Function  Outcome: Progressing  Goal: Fluid and Electrolyte Balance  Outcome: Progressing  Goal: Absence of Infection Signs and Symptoms  Outcome: Progressing  Goal: Optimize Nutrition Status  Outcome: Progressing  Goal: Optimal Pain Control and Function  Outcome: Progressing     Problem: Pain Acute  Goal: Optimal Pain Control and Function  Outcome: Progressing

## 2024-08-26 NOTE — ASSESSMENT & PLAN NOTE
Pod #4 s/p RALH/BSO  Op report reviewed  Incisions healing well  Patient with clinical signs of sepsis - Tmax 100.5, WBC elevated, and tachycardia present.  Suspect postop pelvic infection causing ileus.  Will begin Zosyn and Flagyl for coverage of vaginal cuff cellulitis and parametritis.

## 2024-08-26 NOTE — PROGRESS NOTES
[facility] - Med [facility]  Obstetrics & Gynecology  Progress Note    Patient Name: Estefany Berumen  MRN: [id number]  Admission Date: 8/25/2024  Primary Care Provider: Lm Rdz MD  Principal Problem: Partial small bowel obstruction    Subjective:     HPI:  47 y/o s/p ralhbso for uterine prolapse presents to ER for worsening abdominal pain.  Patient reports actually passing out due to pain.  Feels she has been unable to pass gas.  Patient reports poor appetite due to pain.  Also feels her belly has become more distended.    Interval History: Patient reports mild improvement, but is still having lower abdominal pain.  However, her main complaint is discomfort from her NG tube and she requests removal of it.    Scheduled Meds:   metroNIDAZOLE IV (PEDS and ADULTS)  500 mg Intravenous Q8H    piperacillin-tazobactam (Zosyn) IV (PEDS and ADULTS) (extended infusion is not appropriate)  4.5 g Intravenous Q8H    sodium chloride 0.9%  500 mL Intravenous Once     Continuous Infusions:   D5 and 0.9% NaCl   Intravenous Continuous 100 mL/hr at 08/26/24 0905 New Bag at 08/26/24 0905     PRN Meds:  Current Facility-Administered Medications:     acetaminophen, 975 mg, Per NG tube, Q8H PRN    bisacodyL, 10 mg, Rectal, Daily PRN    butamben-TETRAcaine-benzocaine 2%-2%-14% (200 mg/sec), 1 spray, Topical (Top), Q4H PRN    iohexoL, 100 mL, Intravenous, ONCE PRN    morphine, 2 mg, Intravenous, Q4H PRN    ondansetron, 4 mg, Intravenous, Q6H PRN    Review of patient's allergies indicates:   Allergen Reactions    Sulfa (sulfonamide antibiotics) Other (See Comments)    Sulfamethoxazole-trimethoprim     Trimethoprim Other (See Comments)       Objective:     Vital Signs (Most Recent):  Temp: 99.2 °F (37.3 °C) (08/26/24 0803)  Pulse: (!) 121 (08/26/24 1153)  Resp: (!) 22 (08/26/24 1153)  BP: (!) 147/84 (08/26/24 1153)  SpO2: 99 % (08/26/24 1153) Vital Signs (24h Range):  Temp:  [98 °F (36.7 °C)-100.5 °F (38.1 °C)] 99.2 °F (37.3 °C)  Pulse:   [111-125] 121  Resp:  [16-22] 22  SpO2:  [95 %-100 %] 99 %  BP: (105-155)/(60-87) 147/84     Weight: 98.4 kg (216 lb 14.9 oz)  Body mass index is 36.1 kg/m².  Patient's last menstrual period was 08/05/2024.    I&O (Last 24H):    Intake/Output Summary (Last 24 hours) at 8/26/2024 1315  Last data filed at 8/26/2024 0626  Gross per 24 hour   Intake 555.84 ml   Output 300 ml   Net 255.84 ml         Laboratory:  BMP:   Recent Labs   Lab 08/26/24  0610   *      K 3.4*      CO2 26   BUN 13   CREATININE 0.9   CALCIUM 8.5*     CBC:   Recent Labs   Lab 08/26/24  0610   WBC 13.48*   RBC 4.36   HGB 11.7*   HCT 37.2      MCV 85   MCH 26.8*   MCHC 31.5*       Diagnostic Results:  EXAMINATION:  CT ABDOMEN PELVIS WITH IV CONTRAST     CLINICAL HISTORY:  Bowel obstruction suspected;     TECHNIQUE:  Axial CT images were obtained of the abdomen and pelvis following IV contrast administration and was oral contrast.  Sagittal and coronal reformats obtained.     COMPARISON:  Abdomen and pelvis CT 08/25/2024     FINDINGS:  ABDOMEN:     - Lung bases: Lungs bases are clear.     - Liver: The liver appears normal.     - Gallbladder: Contracted hyperdensities suggesting vicarious excretion of contrast.     - Bile Ducts: No evidence of intra or extra hepatic biliary ductal dilation.     - Spleen: The spleen appears normal.     - Kidneys: Kidneys appear normal.     - Adrenals: Normal adrenals.     - Pancreas: Pancreas appears normal.     - Bowel: Scattered distended loops of proximal to mid small bowel is decompressed distal loops.  Maximum diameter proximal small bowel approximately 3.9 cm.  Scattered small amount of abdominopelvic ascites appears similar.  There is also pneumoperitoneum with scattered foci of air throughout the right abdominal mesentery and some air in the upper subphrenic portions of the abdomen.  Findings appear similar.     - Retroperitoneum:  Unremarkable.     - Vascular: No abdominal aortic  aneurysm. No significant atherosclerosis.     - Abdominal wall:  Unremarkable.     PELVIS:     - Bladder: Collapsed.     - : Hysterectomy.     BONES:  No acute osseous abnormality and no significant arthritic changes.     Impression:     Fairly stable exam.     -Partial small bowel obstruction at the level of the mid small bowel.  Nasogastric tube now present.  The tip of the nasogastric tube is barely within the stomach and tube needs to be advanced several cm.     -Scattered small amount of abdominopelvic ascites as well as scattered pneumoperitoneum with epicenter in the right abdomen.  Certainly viscus perforation is a consideration though findings could be postsurgical with recent KRISTY/BSO.     -negative for abscess     All CT scans at this facility are performed  using dose modulation techniques as appropriate to performed exam including the following:  automated exposure control; adjustment of mA and/or kV according to the patients size (this includes techniques or standardized protocols for targeted exams where dose is matched to indication/reason for exam: i.e. extremities or head);  iterative reconstruction technique.        Electronically signed by:Que Melo MD  Date:                                            08/26/2024  Time:                                           13:17           Exam Ended: 08/26/24 10:35 CDT Last Resulted: 08/26/24 13:17 CDT              Physical Exam:   Constitutional: She is oriented to person, place, and time. She appears well-developed and well-nourished. No distress.       Cardiovascular:  Normal rate.             Pulmonary/Chest: Effort normal.        Abdominal: Soft. She exhibits distension.   Decreased bowel sounds.  Mild, diffuse tenderness.             Musculoskeletal: No tenderness or edema.       Neurological: She is alert and oriented to person, place, and time.     Psychiatric: She has a normal mood and affect.            Assessment/Plan:     * Partial small bowel  obstruction  CT results reviewed.  Continue NPO for now and continue IV fluids.  Appreciate input from General Surgery  Patient states she will leave the hospital AMA if NG tube has to be continued as the tube is causing significant pain to her nose and throat.  Discussed risks vs benefits with her and advised the NG tube can be removed, but may possibly need to be replaced if N/V persists.    Patient also advised of possible need for laparoscopy if symptoms don't improve.    Status post laparoscopic hysterectomy  Pod #4 s/p RALH/BSO  Op report reviewed  Incisions healing well  Patient with clinical signs of sepsis - Tmax 100.5, WBC elevated, and tachycardia present.  Suspect postop pelvic infection causing ileus.  Will begin Zosyn and Flagyl for coverage of vaginal cuff cellulitis and parametritis.    Uterine prolapse       Hypertension  08/25/2024  Hold bp meds for now        Julia Lemus MD  Obstetrics & Gynecology  [facility] - [facility]

## 2024-08-26 NOTE — PLAN OF CARE
Bed locked, in lowest position. Call bell in reach. Purposeful rounding done. Cardiac monitoring in use. Chart check complete. Patient taken down to pre-op at 1705.

## 2024-08-26 NOTE — PROGRESS NOTES
[facility]  General Surgery  Progress Note    Subjective:     History of Present Illness:  Estefany Berumen is a 48 y.o. female w/ history Robotic KRISTY/BSO on 8/22/2024 w/ Dr. Bari Dwyer on whom we were consulted for SBO vs ileus.  Pt presented to the ED last night c/o abdominal pain and distention and reported syncopal episode 2/2 pain.  She states that she has had consistent pain since surgery and has not had any flatus or bowel function since the surgery.  In the ED she was afebrile, tachy to 120s, and borderline hypotensive with BP in the 90s systolic.  Labs revealed a leukocytosis of 14K but were otherwise normal.  CT scan was done which showed moderate generalized free air and free fluid and mildly dilated left-sided loops of small bowel which could reflect ileus or obstruction.  She was admitted to gyn and we were consulted for assistance w/ SBO management.  NGT was placed in the ER and has had minimal output since then as far as I can tell.  Today she states she feels a little better and that her abdomen feels less distended.  She is hungry and wants to know when she can eat.        Post-Op Info:  * No surgery found *         Interval History: Tmax 100.5F. Mild tachycardia, some hypotension.  Passing flatus.     Medications:  Continuous Infusions:   D5 and 0.9% NaCl   Intravenous Continuous 100 mL/hr at 08/25/24 2119 New Bag at 08/25/24 2119     Scheduled Meds:  PRN Meds:  Current Facility-Administered Medications:     acetaminophen, 975 mg, Per NG tube, Q8H PRN    butamben-TETRAcaine-benzocaine 2%-2%-14% (200 mg/sec), 1 spray, Topical (Top), Q4H PRN    morphine, 2 mg, Intravenous, Q4H PRN    ondansetron, 4 mg, Intravenous, Q6H PRN     Review of patient's allergies indicates:   Allergen Reactions    Sulfa (sulfonamide antibiotics) Other (See Comments)    Sulfamethoxazole-trimethoprim     Trimethoprim Other (See Comments)     Objective:     Vital Signs (Most Recent):  Temp: 99.2 °F (37.3 °C)  (08/26/24 0752)  Pulse: (!) 113 (08/26/24 0752)  Resp: 16 (08/26/24 0752)  BP: 124/70 (08/26/24 0752)  SpO2: 100 % (08/26/24 0752) Vital Signs (24h Range):  Temp:  [98 °F (36.7 °C)-100.5 °F (38.1 °C)] 99.2 °F (37.3 °C)  Pulse:  [109-125] 113  Resp:  [16-20] 16  SpO2:  [95 %-100 %] 100 %  BP: ()/(51-87) 124/70     Weight: 98.4 kg (216 lb 14.9 oz)  Body mass index is 36.1 kg/m².    Intake/Output - Last 3 Shifts         08/24 0700 08/25 0659 08/25 0700 08/26 0659 08/26 0700 08/27 0659    I.V. (mL/kg)  555.8 (5.6)     Total Intake(mL/kg)  555.8 (5.6)     Drains  300     Total Output  300     Net  +255.8                     Physical Exam  Constitutional:       General: She is not in acute distress.     Appearance: Normal appearance. She is not ill-appearing or toxic-appearing.   HENT:      Head: Normocephalic and atraumatic.   Eyes:      General: No scleral icterus.     Extraocular Movements: Extraocular movements intact.      Conjunctiva/sclera: Conjunctivae normal.   Cardiovascular:      Rate and Rhythm: Normal rate and regular rhythm.   Pulmonary:      Effort: Pulmonary effort is normal.   Abdominal:      General: There is distension.      Palpations: Abdomen is soft.      Tenderness: There is abdominal tenderness (mildly). There is no guarding or rebound.      Comments: Incisions c/d/I, with no erythema or induration, no peritonitis or rebound   Skin:     General: Skin is warm and dry.      Coloration: Skin is not jaundiced.   Neurological:      General: No focal deficit present.      Mental Status: She is alert.   Psychiatric:         Mood and Affect: Mood normal.         Behavior: Behavior normal.         Thought Content: Thought content normal.          Significant Labs:  I have reviewed all pertinent lab results within the past 24 hours.  CBC:   Recent Labs   Lab 08/26/24  0610   WBC 13.48*   RBC 4.36   HGB 11.7*   HCT 37.2      MCV 85   MCH 26.8*   MCHC 31.5*     BMP:   Recent Labs   Lab  08/26/24  0610   *      K 3.4*      CO2 26   BUN 13   CREATININE 0.9   CALCIUM 8.5*       Significant Diagnostics:  I have reviewed all pertinent imaging results/findings within the past 24 hours.  Assessment/Plan:     * Small bowel obstruction  Passing flatus,  minimal NGT output.  Unclear etiology for persistent leukocytosis and low grade fevers- if high index of suspicion for missed injury would recommend diagnostic laparoscopy, otherwise recommend SBFT to eval passage of contrast vs CT scan with PO contrast and would have a low threshold for diagnostic laparoscopy.      -- recommend CT scan with PO contrast   -- remainder of care as per primary team    Uterine prolapse  -- Management as per primary team     Hypertension  -- management as per primary team         Paulina Bah MD  General Surgery  [facility]

## 2024-08-27 PROBLEM — S36.409A INJURY OF SMALL INTESTINE: Status: ACTIVE | Noted: 2024-08-25

## 2024-08-27 PROBLEM — N81.4 UTERINE PROLAPSE: Status: RESOLVED | Noted: 2024-01-05 | Resolved: 2024-08-27

## 2024-08-27 PROBLEM — Z98.890 S/P EXPLORATORY LAPAROTOMY: Status: ACTIVE | Noted: 2024-08-27

## 2024-08-27 LAB
ALBUMIN SERPL BCP-MCNC: 1.7 G/DL (ref 3.5–5.2)
ALP SERPL-CCNC: 88 U/L (ref 55–135)
ALT SERPL W/O P-5'-P-CCNC: 24 U/L (ref 10–44)
ANION GAP SERPL CALC-SCNC: 9 MMOL/L (ref 8–16)
AST SERPL-CCNC: 25 U/L (ref 10–40)
BASOPHILS # BLD AUTO: 0.03 K/UL (ref 0–0.2)
BASOPHILS NFR BLD: 0.2 % (ref 0–1.9)
BILIRUB SERPL-MCNC: 1.3 MG/DL (ref 0.1–1)
BUN SERPL-MCNC: 13 MG/DL (ref 6–20)
CALCIUM SERPL-MCNC: 8 MG/DL (ref 8.7–10.5)
CHLORIDE SERPL-SCNC: 107 MMOL/L (ref 95–110)
CO2 SERPL-SCNC: 25 MMOL/L (ref 23–29)
CREAT SERPL-MCNC: 0.9 MG/DL (ref 0.5–1.4)
DIFFERENTIAL METHOD BLD: ABNORMAL
EOSINOPHIL # BLD AUTO: 0 K/UL (ref 0–0.5)
EOSINOPHIL NFR BLD: 0 % (ref 0–8)
ERYTHROCYTE [DISTWIDTH] IN BLOOD BY AUTOMATED COUNT: 13.7 % (ref 11.5–14.5)
EST. GFR  (NO RACE VARIABLE): >60 ML/MIN/1.73 M^2
GLUCOSE SERPL-MCNC: 174 MG/DL (ref 70–110)
HCT VFR BLD AUTO: 36.4 % (ref 37–48.5)
HGB BLD-MCNC: 11.4 G/DL (ref 12–16)
IMM GRANULOCYTES # BLD AUTO: 0.11 K/UL (ref 0–0.04)
IMM GRANULOCYTES NFR BLD AUTO: 0.9 % (ref 0–0.5)
LYMPHOCYTES # BLD AUTO: 0.6 K/UL (ref 1–4.8)
LYMPHOCYTES NFR BLD: 4.4 % (ref 18–48)
MCH RBC QN AUTO: 26.5 PG (ref 27–31)
MCHC RBC AUTO-ENTMCNC: 31.3 G/DL (ref 32–36)
MCV RBC AUTO: 85 FL (ref 82–98)
MONOCYTES # BLD AUTO: 1 K/UL (ref 0.3–1)
MONOCYTES NFR BLD: 7.7 % (ref 4–15)
NEUTROPHILS # BLD AUTO: 10.8 K/UL (ref 1.8–7.7)
NEUTROPHILS NFR BLD: 86.8 % (ref 38–73)
NRBC BLD-RTO: 0 /100 WBC
PLATELET # BLD AUTO: 438 K/UL (ref 150–450)
PMV BLD AUTO: 8.7 FL (ref 9.2–12.9)
POTASSIUM SERPL-SCNC: 3.9 MMOL/L (ref 3.5–5.1)
PROT SERPL-MCNC: 5.2 G/DL (ref 6–8.4)
RBC # BLD AUTO: 4.3 M/UL (ref 4–5.4)
SODIUM SERPL-SCNC: 141 MMOL/L (ref 136–145)
WBC # BLD AUTO: 12.4 K/UL (ref 3.9–12.7)

## 2024-08-27 PROCEDURE — 99900035 HC TECH TIME PER 15 MIN (STAT)

## 2024-08-27 PROCEDURE — 63600175 PHARM REV CODE 636 W HCPCS: Performed by: OBSTETRICS & GYNECOLOGY

## 2024-08-27 PROCEDURE — 80053 COMPREHEN METABOLIC PANEL: CPT | Performed by: OBSTETRICS & GYNECOLOGY

## 2024-08-27 PROCEDURE — 11000001 HC ACUTE MED/SURG PRIVATE ROOM

## 2024-08-27 PROCEDURE — 25000003 PHARM REV CODE 250: Performed by: OBSTETRICS & GYNECOLOGY

## 2024-08-27 PROCEDURE — 63600175 PHARM REV CODE 636 W HCPCS: Performed by: SURGERY

## 2024-08-27 PROCEDURE — 85025 COMPLETE CBC W/AUTO DIFF WBC: CPT | Performed by: OBSTETRICS & GYNECOLOGY

## 2024-08-27 PROCEDURE — 94761 N-INVAS EAR/PLS OXIMETRY MLT: CPT

## 2024-08-27 PROCEDURE — 27000221 HC OXYGEN, UP TO 24 HOURS

## 2024-08-27 PROCEDURE — 25000003 PHARM REV CODE 250: Performed by: SURGERY

## 2024-08-27 PROCEDURE — 99024 POSTOP FOLLOW-UP VISIT: CPT | Mod: ,,,

## 2024-08-27 RX ORDER — HYDROMORPHONE HCL IN 0.9% NACL 6 MG/30 ML
PATIENT CONTROLLED ANALGESIA SYRINGE INTRAVENOUS CONTINUOUS
Status: DISCONTINUED | OUTPATIENT
Start: 2024-08-27 | End: 2024-08-28

## 2024-08-27 RX ORDER — KETOROLAC TROMETHAMINE 30 MG/ML
30 INJECTION, SOLUTION INTRAMUSCULAR; INTRAVENOUS EVERY 6 HOURS
Status: COMPLETED | OUTPATIENT
Start: 2024-08-27 | End: 2024-08-27

## 2024-08-27 RX ADMIN — DEXTROSE AND SODIUM CHLORIDE: 5; 900 INJECTION, SOLUTION INTRAVENOUS at 06:08

## 2024-08-27 RX ADMIN — BENZOCAINE, BUTAMBEN, AND TETRACAINE HYDROCHLORIDE 1 SPRAY: .028; .004; .004 AEROSOL, SPRAY TOPICAL at 05:08

## 2024-08-27 RX ADMIN — DEXTROSE AND SODIUM CHLORIDE: 5; 900 INJECTION, SOLUTION INTRAVENOUS at 07:08

## 2024-08-27 RX ADMIN — METHOCARBAMOL 500 MG: 100 INJECTION INTRAMUSCULAR; INTRAVENOUS at 04:08

## 2024-08-27 RX ADMIN — METHOCARBAMOL 500 MG: 100 INJECTION INTRAMUSCULAR; INTRAVENOUS at 09:08

## 2024-08-27 RX ADMIN — MUPIROCIN: 20 OINTMENT TOPICAL at 08:08

## 2024-08-27 RX ADMIN — KETOROLAC TROMETHAMINE 30 MG: 30 INJECTION, SOLUTION INTRAMUSCULAR at 06:08

## 2024-08-27 RX ADMIN — PIPERACILLIN SODIUM AND TAZOBACTAM SODIUM 4.5 G: 4; .5 INJECTION, POWDER, FOR SOLUTION INTRAVENOUS at 08:08

## 2024-08-27 RX ADMIN — SODIUM CHLORIDE: 9 INJECTION, SOLUTION INTRAVENOUS at 08:08

## 2024-08-27 RX ADMIN — KETOROLAC TROMETHAMINE 30 MG: 30 INJECTION, SOLUTION INTRAMUSCULAR at 01:08

## 2024-08-27 RX ADMIN — PIPERACILLIN SODIUM AND TAZOBACTAM SODIUM 4.5 G: 4; .5 INJECTION, POWDER, FOR SOLUTION INTRAVENOUS at 01:08

## 2024-08-27 RX ADMIN — KETOROLAC TROMETHAMINE 30 MG: 30 INJECTION, SOLUTION INTRAMUSCULAR at 05:08

## 2024-08-27 RX ADMIN — ONDANSETRON 4 MG: 2 INJECTION INTRAMUSCULAR; INTRAVENOUS at 04:08

## 2024-08-27 RX ADMIN — PIPERACILLIN SODIUM AND TAZOBACTAM SODIUM 4.5 G: 4; .5 INJECTION, POWDER, FOR SOLUTION INTRAVENOUS at 05:08

## 2024-08-27 RX ADMIN — KETOROLAC TROMETHAMINE 30 MG: 30 INJECTION, SOLUTION INTRAMUSCULAR at 11:08

## 2024-08-27 NOTE — CONSULTS
[facility]  Wound Care    Patient Name:  Estefany Berumen   MRN:  [id number]  Date: 8/27/2024  Diagnosis: Partial small bowel obstruction    History:     Past Medical History:   Diagnosis Date    Hypertension     Myocardial infarction     Uterine prolapse 01/05/2024       Social History     Socioeconomic History    Marital status:    Tobacco Use    Smoking status: Never    Smokeless tobacco: Never   Substance and Sexual Activity    Alcohol use: Not Currently    Drug use: Never    Sexual activity: Yes     Partners: Male     Birth control/protection: Implant     Social Determinants of Health     Financial Resource Strain: Patient Declined (8/16/2024)    Overall Financial Resource Strain (CARDIA)     Difficulty of Paying Living Expenses: Patient declined   Food Insecurity: Patient Declined (8/16/2024)    Hunger Vital Sign     Worried About Running Out of Food in the Last Year: Patient declined     Ran Out of Food in the Last Year: Patient declined   Physical Activity: Patient Declined (8/16/2024)    Exercise Vital Sign     Days of Exercise per Week: Patient declined     Minutes of Exercise per Session: Patient declined   Stress: Patient Declined (8/16/2024)    [employer] [place] [employer] - Occupational Stress Questionnaire     Feeling of Stress : Patient declined   Housing Stability: Unknown (8/16/2024)    Housing Stability Vital Sign     Unable to Pay for Housing in the Last Year: Patient declined       Precautions:     Allergies as of 08/25/2024 - Reviewed 08/25/2024   Allergen Reaction Noted    Sulfa (sulfonamide antibiotics) Other (See Comments) 07/19/2018    Sulfamethoxazole-trimethoprim  09/01/2022    Trimethoprim Other (See Comments) 04/21/2020       [facility] Assessment Details/Treatment     Consulted on Ms. Berumen for wound vac management. Patient admitted with SBO and is POD 1 exploratory laparotomy with excision of small intestine and application of wound vac. She is awake and alert, NGT  in place. Wound vac in place to abdominal incision with good seal, serosanguinous drainage in cannister, no leak. Discussed POC for bedside dressing changes with patient. She is currently on PCA for pain control. Will plan for wound vac dressing change tomorrow vs. Thursday.        08/27/2024

## 2024-08-27 NOTE — ASSESSMENT & PLAN NOTE
08/27/2024  Pod #1  S/p dx laparosocpy converted to exp laparotomy with small bowel resection  Remains afebrile on zosyn  Continue NG tube  Continue NPO  Advance diet as per G. Surgery recommendations

## 2024-08-27 NOTE — PROGRESS NOTES
[facility]  Obstetrics & Gynecology  Progress Note    Patient Name: Estefany Berumen  MRN: [id number]  Admission Date: 8/25/2024  Primary Care Provider: Lm Rdz MD  Principal Problem: Partial small bowel obstruction    Subjective:     HPI:  47 y/o s/p ralhbso for uterine prolapse presents to ER for worsening abdominal pain.  Patient reports actually passing out due to pain.  Feels she has been unable to pass gas.  Patient reports poor appetite due to pain.  Also feels her belly has become more distended.    Interval History: pain controlled, abdomen feel better, denies flatus; denies nausea/vomiting;     Scheduled Meds:   ketorolac  30 mg Intravenous Q6H    mupirocin   Nasal BID    piperacillin-tazobactam (Zosyn) IV (PEDS and ADULTS) (extended infusion is not appropriate)  4.5 g Intravenous Q8H     Continuous Infusions:   D5 and 0.9% NaCl   Intravenous Continuous 100 mL/hr at 08/27/24 0715 New Bag at 08/27/24 0715    hydromorphone in 0.9 % NaCl 6 mg/30 ml   Intravenous Continuous   New Syringe/Bag at 08/26/24 2012     PRN Meds:  Current Facility-Administered Medications:     0.9% NaCl, , Intravenous, PRN    butamben-TETRAcaine-benzocaine 2%-2%-14% (200 mg/sec), 1 spray, Topical (Top), Q4H PRN    dextrose 10%, 12.5 g, Intravenous, PRN    dextrose 10%, 25 g, Intravenous, PRN    naloxone, 0.02 mg, Intravenous, PRN    ondansetron, 4 mg, Intravenous, Q6H PRN    Review of patient's allergies indicates:   Allergen Reactions    Sulfa (sulfonamide antibiotics) Other (See Comments)    Sulfamethoxazole-trimethoprim     Trimethoprim Other (See Comments)       Objective:     Vital Signs (Most Recent):  Temp: 98.1 °F (36.7 °C) (08/27/24 0742)  Pulse: (!) 111 (08/27/24 0807)  Resp: 16 (08/27/24 0807)  BP: 119/60 (08/27/24 0742)  SpO2: 98 % (08/27/24 0807) Vital Signs (24h Range):  Temp:  [97.5 °F (36.4 °C)-98.8 °F (37.1 °C)] 98.1 °F (36.7 °C)  Pulse:  [102-133] 111  Resp:  [16-26] 16  SpO2:  [93 %-100 %] 98  %  BP: (113-163)/(58-93) 119/60     Weight: 109.5 kg (241 lb 6.5 oz)  Body mass index is 40.17 kg/m².  Patient's last menstrual period was 08/05/2024.    I&O (Last 24H):    Intake/Output Summary (Last 24 hours) at 8/27/2024 0809  Last data filed at 8/27/2024 0716  Gross per 24 hour   Intake 1506 ml   Output 350 ml   Net 1156 ml         Laboratory:  Recent Lab Results         08/27/24  0546        Albumin 1.7       ALP 88       ALT 24       Anion Gap 9       AST 25       Baso # 0.03       Basophil % 0.2       BILIRUBIN TOTAL 1.3  Comment: For infants and newborns, interpretation of results should be based  on gestational age, weight and in agreement with clinical  observations.    Premature Infant recommended reference ranges:  Up to 24 hours.............<8.0 mg/dL  Up to 48 hours............<12.0 mg/dL  3-5 days..................<15.0 mg/dL  6-29 days.................<15.0 mg/dL         BUN 13       Calcium 8.0       Chloride 107       CO2 25       Creatinine 0.9       Differential Method Automated       eGFR >60       Eos # 0.0       Eos % 0.0       Glucose 174       Gran # (ANC) 10.8       Gran % 86.8       Hematocrit 36.4       Hemoglobin 11.4       Immature Grans (Abs) 0.11  Comment: Mild elevation in immature granulocytes is non specific and   can be seen in a variety of conditions including stress response,   acute inflammation, trauma and pregnancy. Correlation with other   laboratory and clinical findings is essential.         Immature Granulocytes 0.9       Lymph # 0.6       Lymph % 4.4       MCH 26.5       MCHC 31.3       MCV 85       Mono # 1.0       Mono % 7.7       MPV 8.7       nRBC 0       Platelet Count 438       Potassium 3.9       PROTEIN TOTAL 5.2       RBC 4.30       RDW 13.7       Sodium 141       WBC 12.40             I have personallly reviewed all pertinent lab results from the last 24 hours.    Diagnostic Results:  Labs: Reviewed     Physical Exam:   Constitutional: She is oriented to  person, place, and time. She appears well-developed.    HENT:   Head: Normocephalic.    Eyes: Pupils are equal, round, and reactive to light.     Cardiovascular:  Normal rate and regular rhythm.             Pulmonary/Chest: Effort normal and breath sounds normal.        Abdominal: Soft. She exhibits distension. She exhibits no abdominal incision (wound vac in place;). There is no rebound and no guarding.   No bowel sounds present             Musculoskeletal: Normal range of motion.       Neurological: She is alert and oriented to person, place, and time.    Skin: Skin is warm and dry.    Psychiatric: She has a normal mood and affect. Her behavior is normal. Judgment and thought content normal.        Review of Systems    Assessment/Plan:     * Partial small bowel obstruction  CT results reviewed.  Continue NPO for now and continue IV fluids.  Appreciate input from General Surgery  Patient states she will leave the hospital AMA if NG tube has to be continued as the tube is causing significant pain to her nose and throat.  Discussed risks vs benefits with her and advised the NG tube can be removed, but may possibly need to be replaced if N/V persists.    Patient also advised of possible need for laparoscopy if symptoms don't improve.    S/P exploratory laparotomy; small bowel resection  08/27/2024  Pod #1  S/p dx laparosocpy converted to exp laparotomy with small bowel resection  Remains afebrile on zosyn  Continue NG tube  Continue NPO  Advance diet as per G. Surgery recommendations      Status post laparoscopic hysterectomy  Pod #4 s/p RALH/BSO  Op report reviewed  Incisions healing well  Patient with clinical signs of sepsis - Tmax 100.5, WBC elevated, and tachycardia present.  Suspect postop pelvic infection causing ileus.  Will begin Zosyn and Flagyl for coverage of vaginal cuff cellulitis and parametritis.  08/27/2024  Denies vaginal bleeding  Reports concern over 2 episodes of incontinence yesterday--during ct  scan and with cough  Currently with catherter in place  Will continue to monitor    Hypertension  08/25/2024  Hold bp meds for now        Tigist Mejia MD  Obstetrics & Gynecology  [facility]

## 2024-08-27 NOTE — ASSESSMENT & PLAN NOTE
Pod #4 s/p RALH/BSO  Op report reviewed  Incisions healing well  Patient with clinical signs of sepsis - Tmax 100.5, WBC elevated, and tachycardia present.  Suspect postop pelvic infection causing ileus.  Will begin Zosyn and Flagyl for coverage of vaginal cuff cellulitis and parametritis.  08/27/2024  Denies vaginal bleeding  Reports concern over 2 episodes of incontinence yesterday--during ct scan and with cough  Currently with catherter in place  Will continue to monitor

## 2024-08-27 NOTE — PLAN OF CARE
Problem: Adult Inpatient Plan of Care  Goal: Plan of Care Review  Outcome: Progressing  Goal: Patient-Specific Goal (Individualized)  Outcome: Progressing  Goal: Absence of Hospital-Acquired Illness or Injury  Outcome: Progressing  Goal: Optimal Comfort and Wellbeing  Outcome: Progressing  Goal: Readiness for Transition of Care  Outcome: Progressing     Problem: Wound  Goal: Optimal Coping  Outcome: Progressing  Goal: Optimal Functional Ability  Outcome: Progressing  Goal: Absence of Infection Signs and Symptoms  Outcome: Progressing  Goal: Improved Oral Intake  Outcome: Progressing  Goal: Optimal Pain Control and Function  Outcome: Progressing  Goal: Skin Health and Integrity  Outcome: Progressing  Goal: Optimal Wound Healing  Outcome: Progressing     Problem: Intestinal Obstruction  Goal: Optimal Bowel Function  Outcome: Progressing  Goal: Fluid and Electrolyte Balance  Outcome: Progressing  Goal: Absence of Infection Signs and Symptoms  Outcome: Progressing  Goal: Optimize Nutrition Status  Outcome: Progressing  Goal: Optimal Pain Control and Function  Outcome: Progressing     Problem: Pain Acute  Goal: Optimal Pain Control and Function  Outcome: Progressing     Problem: Infection  Goal: Absence of Infection Signs and Symptoms  Outcome: Progressing     Problem: Bariatric Environmental Safety  Goal: Safety Maintained with Care  Outcome: Progressing     Problem: Fall Injury Risk  Goal: Absence of Fall and Fall-Related Injury  Outcome: Progressing

## 2024-08-27 NOTE — SUBJECTIVE & OBJECTIVE
Subjective:     Interval History: s/p ex lap with small bowel excision due to perforation, application of wound vac. No bowel function post op. Feeling better compared to pre op     Post-Op Info:  Procedure(s) (LRB):  LAPAROSCOPY, DIAGNOSTIC (N/A)  EXCISION, SMALL INTESTINE (N/A)  APPLICATION, ACELLULAR HUMAN DERMAL ALLOGRAFT (N/A)  LAPAROTOMY, EXPLORATORY (N/A)  LAPAROTOMY, EXPLORATORY (N/A)  APPLICATION, WOUND VAC (N/A)  BLOCK, TRANSVERSUS ABDOMINIS PLANE (Bilateral)   1 Day Post-Op      Medications:  Continuous Infusions:   D5 and 0.9% NaCl   Intravenous Continuous 100 mL/hr at 08/27/24 0715 New Bag at 08/27/24 0715    hydromorphone in 0.9 % NaCl 6 mg/30 ml   Intravenous Continuous   Rate Change at 08/27/24 1330     Scheduled Meds:   ketorolac  30 mg Intravenous Q6H    methocarbamol (ROBAXIN) IVPB  500 mg Intravenous Q8H    mupirocin   Nasal BID    piperacillin-tazobactam (Zosyn) IV (PEDS and ADULTS) (extended infusion is not appropriate)  4.5 g Intravenous Q8H     PRN Meds:   ketorolac injection 30 mg    methocarbamoL (ROBAXIN) 500 mg in D5W 100 mL IVPB    mupirocin 2 % ointment    piperacillin-tazobactam (ZOSYN) 4.5 g in D5W 100 mL IVPB (MB+)        Objective:     Vital Signs (Most Recent):  Temp: 97.6 °F (36.4 °C) (08/27/24 1616)  Pulse: (!) 111 (08/27/24 1616)  Resp: 18 (08/27/24 1616)  BP: 123/73 (08/27/24 1616)  SpO2: 99 % (08/27/24 1616) Vital Signs (24h Range):  Temp:  [97.5 °F (36.4 °C)-98.8 °F (37.1 °C)] 97.6 °F (36.4 °C)  Pulse:  [102-112] 111  Resp:  [16-26] 18  SpO2:  [93 %-100 %] 99 %  BP: (113-163)/(58-93) 123/73     Intake/Output - Last 3 Shifts         08/25 0700  08/26 0659 08/26 0700 08/27 0659 08/27 0700 08/28 0659    I.V. (mL/kg) 555.8 (5.6)  6 (0.1)    IV Piggyback  1500     Total Intake(mL/kg) 555.8 (5.6) 1500 (13.7) 6 (0.1)    Urine (mL/kg/hr)  300 (0.1)     Drains 300  50    Total Output 300 300 50    Net +255.8 +1200 -44                    Physical Exam  Constitutional:        General: She is not in acute distress.     Appearance: Normal appearance. She is not ill-appearing or toxic-appearing.   HENT:      Head: Normocephalic and atraumatic.   Eyes:      General: No scleral icterus.     Extraocular Movements: Extraocular movements intact.      Conjunctiva/sclera: Conjunctivae normal.   Cardiovascular:      Rate and Rhythm: Tachycardia present.   Pulmonary:      Effort: Pulmonary effort is normal.   Abdominal:      General: There is distension.      Palpations: Abdomen is soft.      Tenderness: There is no guarding or rebound.      Comments: Wound vac in place, appropriately TTP   Skin:     General: Skin is warm and dry.      Coloration: Skin is not jaundiced.   Neurological:      General: No focal deficit present.      Mental Status: She is alert.   Psychiatric:         Mood and Affect: Mood normal.         Behavior: Behavior normal.        Significant Labs:  BMP (Last 3 Results):   Recent Labs   Lab 08/25/24  0338 08/26/24  0610 08/27/24  0546   * 136* 174*    142 141   K 3.5 3.4* 3.9    108 107   CO2 23 26 25   BUN 21* 13 13   CREATININE 1.0 0.9 0.9   CALCIUM 9.3 8.5* 8.0*     CBC (Last 3 Results):   Recent Labs   Lab 08/25/24  0338 08/26/24  0610 08/27/24  0546   WBC 14.21* 13.48* 12.40   RBC 5.15 4.36 4.30   HGB 13.8 11.7* 11.4*   HCT 43.4 37.2 36.4*    435 438   MCV 84 85 85   MCH 26.8* 26.8* 26.5*   MCHC 31.8* 31.5* 31.3*       Significant Diagnostics:  I have reviewed all pertinent imaging results/findings within the past 24 hours.

## 2024-08-27 NOTE — ASSESSMENT & PLAN NOTE
s/p diagnostic laparoscopy converted to laparotomy and small bowel resection for enterotomy 08/26/24     - awaiting return of bowel function  - continue NPO, NG  - continue PCA + multimodal pain control   - continue IV abx given manish contamination   - continue wound vac; changes per wound care nurses  - encourage OOB, ambulation  - incentive spirometry

## 2024-08-27 NOTE — SUBJECTIVE & OBJECTIVE
Interval History: Afebrile, VS improved.  No flatus or BM.  Bilious output from NGT.      Medications:  Continuous Infusions:   D5 and 0.9% NaCl   Intravenous Continuous 100 mL/hr at 08/27/24 0715 New Bag at 08/27/24 0715    hydromorphone in 0.9 % NaCl 6 mg/30 ml   Intravenous Continuous         Scheduled Meds:   ketorolac  30 mg Intravenous Q6H    methocarbamol (ROBAXIN) IVPB  500 mg Intravenous Q8H    mupirocin   Nasal BID    piperacillin-tazobactam (Zosyn) IV (PEDS and ADULTS) (extended infusion is not appropriate)  4.5 g Intravenous Q8H     PRN Meds:  Current Facility-Administered Medications:     0.9% NaCl, , Intravenous, PRN    butamben-TETRAcaine-benzocaine 2%-2%-14% (200 mg/sec), 1 spray, Topical (Top), Q4H PRN    dextrose 10%, 12.5 g, Intravenous, PRN    dextrose 10%, 25 g, Intravenous, PRN    naloxone, 0.02 mg, Intravenous, PRN    ondansetron, 4 mg, Intravenous, Q6H PRN     Review of patient's allergies indicates:   Allergen Reactions    Sulfa (sulfonamide antibiotics) Other (See Comments)    Sulfamethoxazole-trimethoprim     Trimethoprim Other (See Comments)     Objective:     Vital Signs (Most Recent):  Temp: 97.8 °F (36.6 °C) (08/27/24 1144)  Pulse: 106 (08/27/24 1144)  Resp: 18 (08/27/24 1144)  BP: (!) 142/74 (08/27/24 1144)  SpO2: 100 % (08/27/24 1144) Vital Signs (24h Range):  Temp:  [97.5 °F (36.4 °C)-98.8 °F (37.1 °C)] 97.8 °F (36.6 °C)  Pulse:  [102-133] 106  Resp:  [16-26] 18  SpO2:  [93 %-100 %] 100 %  BP: (113-163)/(58-93) 142/74     Weight: 109.5 kg (241 lb 6.5 oz)  Body mass index is 40.17 kg/m².    Intake/Output - Last 3 Shifts         08/25 0700 08/26 0659 08/26 0700 08/27 0659 08/27 0700 08/28 0659    I.V. (mL/kg) 555.8 (5.6)  6 (0.1)    IV Piggyback  1500     Total Intake(mL/kg) 555.8 (5.6) 1500 (13.7) 6 (0.1)    Urine (mL/kg/hr)  300 (0.1)     Drains 300  50    Total Output 300 300 50    Net +255.8 +1200 -44                    Physical Exam  Constitutional:       General: She is not  in acute distress.     Appearance: Normal appearance. She is not ill-appearing or toxic-appearing.   HENT:      Head: Normocephalic and atraumatic.   Eyes:      General: No scleral icterus.     Extraocular Movements: Extraocular movements intact.      Conjunctiva/sclera: Conjunctivae normal.   Cardiovascular:      Rate and Rhythm: Normal rate and regular rhythm.   Pulmonary:      Effort: Pulmonary effort is normal.   Abdominal:      General: There is distension.      Palpations: Abdomen is soft.      Tenderness: There is abdominal tenderness (mildly). There is no guarding or rebound.      Comments: Wound vac in place, appropriately TTP    Skin:     General: Skin is warm and dry.      Coloration: Skin is not jaundiced.   Neurological:      General: No focal deficit present.      Mental Status: She is alert.   Psychiatric:         Mood and Affect: Mood normal.         Behavior: Behavior normal.         Thought Content: Thought content normal.          Significant Labs:  I have reviewed all pertinent lab results within the past 24 hours.  CBC:   Recent Labs   Lab 08/27/24  0546   WBC 12.40   RBC 4.30   HGB 11.4*   HCT 36.4*      MCV 85   MCH 26.5*   MCHC 31.3*     BMP:   Recent Labs   Lab 08/27/24  0546   *      K 3.9      CO2 25   BUN 13   CREATININE 0.9   CALCIUM 8.0*       Significant Diagnostics:  I have reviewed all pertinent imaging results/findings within the past 24 hours.

## 2024-08-27 NOTE — PROGRESS NOTES
[facility]  Colorectal Surgery  Progress Note    Patient Name: Estefany Berumen  MRN: [id number]  Admission Date: 8/25/2024  Hospital Length of Stay: 2 days  Attending Physician: Tigist Mejia MD    Subjective:     Interval History: s/p ex lap with small bowel excision due to perforation, application of wound vac. No bowel function post op. Feeling better compared to pre op     Post-Op Info:  Procedure(s) (LRB):  LAPAROSCOPY, DIAGNOSTIC (N/A)  EXCISION, SMALL INTESTINE (N/A)  APPLICATION, ACELLULAR HUMAN DERMAL ALLOGRAFT (N/A)  LAPAROTOMY, EXPLORATORY (N/A)  LAPAROTOMY, EXPLORATORY (N/A)  APPLICATION, WOUND VAC (N/A)  BLOCK, TRANSVERSUS ABDOMINIS PLANE (Bilateral)   1 Day Post-Op      Medications:  Continuous Infusions:   D5 and 0.9% NaCl   Intravenous Continuous 100 mL/hr at 08/27/24 0715 New Bag at 08/27/24 0715    hydromorphone in 0.9 % NaCl 6 mg/30 ml   Intravenous Continuous   Rate Change at 08/27/24 1330     Scheduled Meds:   ketorolac  30 mg Intravenous Q6H    methocarbamol (ROBAXIN) IVPB  500 mg Intravenous Q8H    mupirocin   Nasal BID    piperacillin-tazobactam (Zosyn) IV (PEDS and ADULTS) (extended infusion is not appropriate)  4.5 g Intravenous Q8H     PRN Meds:   ketorolac injection 30 mg    methocarbamoL (ROBAXIN) 500 mg in D5W 100 mL IVPB    mupirocin 2 % ointment    piperacillin-tazobactam (ZOSYN) 4.5 g in D5W 100 mL IVPB (MB+)        Objective:     Vital Signs (Most Recent):  Temp: 97.6 °F (36.4 °C) (08/27/24 1616)  Pulse: (!) 111 (08/27/24 1616)  Resp: 18 (08/27/24 1616)  BP: 123/73 (08/27/24 1616)  SpO2: 99 % (08/27/24 1616) Vital Signs (24h Range):  Temp:  [97.5 °F (36.4 °C)-98.8 °F (37.1 °C)] 97.6 °F (36.4 °C)  Pulse:  [102-112] 111  Resp:  [16-26] 18  SpO2:  [93 %-100 %] 99 %  BP: (113-163)/(58-93) 123/73     Intake/Output - Last 3 Shifts         08/25 0700 08/26 0659 08/26 0700 08/27 0659 08/27 0700 08/28 0659    I.V. (mL/kg) 555.8 (5.6)  6 (0.1)    IV Piggyback  1500     Total  Intake(mL/kg) 555.8 (5.6) 1500 (13.7) 6 (0.1)    Urine (mL/kg/hr)  300 (0.1)     Drains 300  50    Total Output 300 300 50    Net +255.8 +1200 -44                    Physical Exam  Constitutional:       General: She is not in acute distress.     Appearance: Normal appearance. She is not ill-appearing or toxic-appearing.   HENT:      Head: Normocephalic and atraumatic.   Eyes:      General: No scleral icterus.     Extraocular Movements: Extraocular movements intact.      Conjunctiva/sclera: Conjunctivae normal.   Cardiovascular:      Rate and Rhythm: Tachycardia present.   Pulmonary:      Effort: Pulmonary effort is normal.   Abdominal:      General: There is distension.      Palpations: Abdomen is soft.      Tenderness: There is no guarding or rebound.      Comments: Wound vac in place, appropriately TTP   Skin:     General: Skin is warm and dry.      Coloration: Skin is not jaundiced.   Neurological:      General: No focal deficit present.      Mental Status: She is alert.   Psychiatric:         Mood and Affect: Mood normal.         Behavior: Behavior normal.        Significant Labs:  BMP (Last 3 Results):   Recent Labs   Lab 08/25/24  0338 08/26/24  0610 08/27/24  0546   * 136* 174*    142 141   K 3.5 3.4* 3.9    108 107   CO2 23 26 25   BUN 21* 13 13   CREATININE 1.0 0.9 0.9   CALCIUM 9.3 8.5* 8.0*     CBC (Last 3 Results):   Recent Labs   Lab 08/25/24  0338 08/26/24  0610 08/27/24  0546   WBC 14.21* 13.48* 12.40   RBC 5.15 4.36 4.30   HGB 13.8 11.7* 11.4*   HCT 43.4 37.2 36.4*    435 438   MCV 84 85 85   MCH 26.8* 26.8* 26.5*   MCHC 31.8* 31.5* 31.3*       Significant Diagnostics:  I have reviewed all pertinent imaging results/findings within the past 24 hours.  Assessment/Plan:     * S/P exploratory laparotomy; small bowel resection  s/p diagnostic laparoscopy converted to laparotomy and small bowel resection for enterotomy 08/26/24     - awaiting return of bowel function  - continue  NPO, NG  - continue PCA + multimodal pain control   - continue IV abx given manish contamination   - continue wound vac; changes per wound care nurses  - encourage OOB, ambulation  - incentive spirometry        Injury of small intestine  See plan for ex lap, SB resection    Hypertension  Care per primary team          Kp Sylvester PA-C  Colorectal Surgery  [facility]

## 2024-08-27 NOTE — TRANSFER OF CARE
"Anesthesia Transfer of Care Note    Patient: Estefany Berumen    Procedure(s) Performed: Procedure(s) (LRB):  LAPAROSCOPY, DIAGNOSTIC (N/A)  EXCISION, SMALL INTESTINE (N/A)  APPLICATION, ACELLULAR HUMAN DERMAL ALLOGRAFT (N/A)  LAPAROTOMY, EXPLORATORY (N/A)  LAPAROTOMY, EXPLORATORY (N/A)  APPLICATION, WOUND VAC (N/A)  BLOCK, TRANSVERSUS ABDOMINIS PLANE (Bilateral)    Patient location: PACU    Anesthesia Type: general    Transport from OR: Transported from OR on room air with adequate spontaneous ventilation    Post pain: adequate analgesia    Post assessment: no apparent anesthetic complications and tolerated procedure well    Post vital signs: stable    Level of consciousness: responds to stimulation and sedated    Nausea/Vomiting: no nausea/vomiting    Complications: none    Transfer of care protocol was followedComments: Report given to PACU RN at bedside. Hand off tool used. RN given opportunity to ask questions or clarify concerns. No Concerns verbalized. RN was asked if ready to assume care of patient. RN verbally confirmed. Pt. left in stable condition. SV. Vital Signs Return to Near Baseline. No s/s of distress noted.       Last vitals: Visit Vitals  BP (!) 147/84   Pulse (!) 133   Temp 37.3 °C (99.2 °F)   Resp (!) 22   Ht 5' 5" (1.651 m)   Wt 98.4 kg (216 lb 14.9 oz)   LMP 08/05/2024   SpO2 99%   Breastfeeding No   BMI 36.10 kg/m²     "

## 2024-08-27 NOTE — ANESTHESIA POSTPROCEDURE EVALUATION
Anesthesia Post Evaluation    Patient: Estefany Berumen    Procedure(s) Performed: Procedure(s) (LRB):  LAPAROSCOPY, DIAGNOSTIC (N/A)  EXCISION, SMALL INTESTINE (N/A)  APPLICATION, ACELLULAR HUMAN DERMAL ALLOGRAFT (N/A)  LAPAROTOMY, EXPLORATORY (N/A)  LAPAROTOMY, EXPLORATORY (N/A)  APPLICATION, WOUND VAC (N/A)  BLOCK, TRANSVERSUS ABDOMINIS PLANE (Bilateral)    Final Anesthesia Type: general      Patient location during evaluation: PACU  Patient participation: Yes- Able to Participate  Level of consciousness: awake and alert  Post-procedure vital signs: reviewed and stable  Pain management: adequate  Airway patency: patent  ANIA mitigation strategies: Verification of full reversal of neuromuscular block  PONV status at discharge: No PONV  Anesthetic complications: no      Cardiovascular status: hemodynamically stable  Respiratory status: spontaneous ventilation  Hydration status: euvolemic  Follow-up not needed.              Vitals Value Taken Time   /65 08/26/24 2057   Temp 36.9 °C (98.4 °F) 08/26/24 2057   Pulse 110 08/26/24 2057   Resp 19 08/26/24 2057   SpO2 93 % 08/26/24 2057         Event Time   Out of Recovery 08/26/2024 20:39:00         Pain/Yoanna Score: Pain Rating Prior to Med Admin: 8 (8/26/2024  8:12 PM)  Pain Rating Post Med Admin: 6 (8/25/2024  9:48 PM)  Yoanna Score: 8 (8/26/2024  8:30 PM)

## 2024-08-27 NOTE — PROGRESS NOTES
[facility]  General Surgery  Progress Note    Subjective:     History of Present Illness:  Estefany Berumen is a 48 y.o. female w/ history Robotic KRISTY/BSO on 8/22/2024 w/ Dr. Bari Dwyer on whom we were consulted for SBO vs ileus.  Pt presented to the ED last night c/o abdominal pain and distention and reported syncopal episode 2/2 pain.  She states that she has had consistent pain since surgery and has not had any flatus or bowel function since the surgery.  In the ED she was afebrile, tachy to 120s, and borderline hypotensive with BP in the 90s systolic.  Labs revealed a leukocytosis of 14K but were otherwise normal.  CT scan was done which showed moderate generalized free air and free fluid and mildly dilated left-sided loops of small bowel which could reflect ileus or obstruction.  She was admitted to gyn and we were consulted for assistance w/ SBO management.  NGT was placed in the ER and has had minimal output since then as far as I can tell.  Today she states she feels a little better and that her abdomen feels less distended.  She is hungry and wants to know when she can eat.        Post-Op Info:  Procedure(s) (LRB):  LAPAROSCOPY, DIAGNOSTIC (N/A)  EXCISION, SMALL INTESTINE (N/A)  APPLICATION, ACELLULAR HUMAN DERMAL ALLOGRAFT (N/A)  LAPAROTOMY, EXPLORATORY (N/A)  LAPAROTOMY, EXPLORATORY (N/A)  APPLICATION, WOUND VAC (N/A)  BLOCK, TRANSVERSUS ABDOMINIS PLANE (Bilateral)   1 Day Post-Op     Interval History: Afebrile, VS improved.  No flatus or BM.  Bilious output from NGT.      Medications:  Continuous Infusions:   D5 and 0.9% NaCl   Intravenous Continuous 100 mL/hr at 08/27/24 0715 New Bag at 08/27/24 0715    hydromorphone in 0.9 % NaCl 6 mg/30 ml   Intravenous Continuous         Scheduled Meds:   ketorolac  30 mg Intravenous Q6H    methocarbamol (ROBAXIN) IVPB  500 mg Intravenous Q8H    mupirocin   Nasal BID    piperacillin-tazobactam (Zosyn) IV (PEDS and ADULTS) (extended infusion is not  appropriate)  4.5 g Intravenous Q8H     PRN Meds:  Current Facility-Administered Medications:     0.9% NaCl, , Intravenous, PRN    butamben-TETRAcaine-benzocaine 2%-2%-14% (200 mg/sec), 1 spray, Topical (Top), Q4H PRN    dextrose 10%, 12.5 g, Intravenous, PRN    dextrose 10%, 25 g, Intravenous, PRN    naloxone, 0.02 mg, Intravenous, PRN    ondansetron, 4 mg, Intravenous, Q6H PRN     Review of patient's allergies indicates:   Allergen Reactions    Sulfa (sulfonamide antibiotics) Other (See Comments)    Sulfamethoxazole-trimethoprim     Trimethoprim Other (See Comments)     Objective:     Vital Signs (Most Recent):  Temp: 97.8 °F (36.6 °C) (08/27/24 1144)  Pulse: 106 (08/27/24 1144)  Resp: 18 (08/27/24 1144)  BP: (!) 142/74 (08/27/24 1144)  SpO2: 100 % (08/27/24 1144) Vital Signs (24h Range):  Temp:  [97.5 °F (36.4 °C)-98.8 °F (37.1 °C)] 97.8 °F (36.6 °C)  Pulse:  [102-133] 106  Resp:  [16-26] 18  SpO2:  [93 %-100 %] 100 %  BP: (113-163)/(58-93) 142/74     Weight: 109.5 kg (241 lb 6.5 oz)  Body mass index is 40.17 kg/m².    Intake/Output - Last 3 Shifts         08/25 0700 08/26 0659 08/26 0700 08/27 0659 08/27 0700 08/28 0659    I.V. (mL/kg) 555.8 (5.6)  6 (0.1)    IV Piggyback  1500     Total Intake(mL/kg) 555.8 (5.6) 1500 (13.7) 6 (0.1)    Urine (mL/kg/hr)  300 (0.1)     Drains 300  50    Total Output 300 300 50    Net +255.8 +1200 -44                    Physical Exam  Constitutional:       General: She is not in acute distress.     Appearance: Normal appearance. She is not ill-appearing or toxic-appearing.   HENT:      Head: Normocephalic and atraumatic.   Eyes:      General: No scleral icterus.     Extraocular Movements: Extraocular movements intact.      Conjunctiva/sclera: Conjunctivae normal.   Cardiovascular:      Rate and Rhythm: Normal rate and regular rhythm.   Pulmonary:      Effort: Pulmonary effort is normal.   Abdominal:      General: There is distension.      Palpations: Abdomen is soft.       Tenderness: There is abdominal tenderness (mildly). There is no guarding or rebound.      Comments: Wound vac in place, appropriately TTP    Skin:     General: Skin is warm and dry.      Coloration: Skin is not jaundiced.   Neurological:      General: No focal deficit present.      Mental Status: She is alert.   Psychiatric:         Mood and Affect: Mood normal.         Behavior: Behavior normal.         Thought Content: Thought content normal.          Significant Labs:  I have reviewed all pertinent lab results within the past 24 hours.  CBC:   Recent Labs   Lab 08/27/24  0546   WBC 12.40   RBC 4.30   HGB 11.4*   HCT 36.4*      MCV 85   MCH 26.5*   MCHC 31.3*     BMP:   Recent Labs   Lab 08/27/24  0546   *      K 3.9      CO2 25   BUN 13   CREATININE 0.9   CALCIUM 8.0*       Significant Diagnostics:  I have reviewed all pertinent imaging results/findings within the past 24 hours.  Assessment/Plan:     * Injury of small intestine  See exlap assessment and plan     S/P exploratory laparotomy; small bowel resection  POD #1- s/p diagnostic laparoscopy converted to laparotomy and small bowel resection for enterotomy     Doing ok.  Awaiting return of bowel function.  PCA helpful but pt w/ significant abdominal pain still.     -- NPO + NGT  -- IVF  -- dilaudid PCA for pain- settings adjusted by me today.  Adding Robaxin for multimodal care, as well as toradol   -- continue zosyn due to manish contamination   -- continue wound vac- wound care consult for assistance  -- zhao out tomorrow   -- encourage ambulation and IS  -- remainder of care as per primary team     Hypertension  -- management as per primary team         Paulina Bah MD  General Surgery  [facility]

## 2024-08-27 NOTE — ASSESSMENT & PLAN NOTE
POD #1- s/p diagnostic laparoscopy converted to laparotomy and small bowel resection for enterotomy     Doing ok.  Awaiting return of bowel function.  PCA helpful but pt w/ significant abdominal pain still.     -- NPO + NGT  -- IVF  -- dilaudid PCA for pain- settings adjusted by me today.  Adding Robaxin for multimodal care, as well as toradol   -- continue zosyn due to manish contamination   -- continue wound vac- wound care consult for assistance  -- zhao out tomorrow   -- encourage ambulation and IS  -- remainder of care as per primary team

## 2024-08-27 NOTE — SUBJECTIVE & OBJECTIVE
Interval History: pain controlled, abdomen feel better, denies flatus; denies nausea/vomiting;     Scheduled Meds:   ketorolac  30 mg Intravenous Q6H    mupirocin   Nasal BID    piperacillin-tazobactam (Zosyn) IV (PEDS and ADULTS) (extended infusion is not appropriate)  4.5 g Intravenous Q8H     Continuous Infusions:   D5 and 0.9% NaCl   Intravenous Continuous 100 mL/hr at 08/27/24 0715 New Bag at 08/27/24 0715    hydromorphone in 0.9 % NaCl 6 mg/30 ml   Intravenous Continuous   New Syringe/Bag at 08/26/24 2012     PRN Meds:  Current Facility-Administered Medications:     0.9% NaCl, , Intravenous, PRN    butamben-TETRAcaine-benzocaine 2%-2%-14% (200 mg/sec), 1 spray, Topical (Top), Q4H PRN    dextrose 10%, 12.5 g, Intravenous, PRN    dextrose 10%, 25 g, Intravenous, PRN    naloxone, 0.02 mg, Intravenous, PRN    ondansetron, 4 mg, Intravenous, Q6H PRN    Review of patient's allergies indicates:   Allergen Reactions    Sulfa (sulfonamide antibiotics) Other (See Comments)    Sulfamethoxazole-trimethoprim     Trimethoprim Other (See Comments)       Objective:     Vital Signs (Most Recent):  Temp: 98.1 °F (36.7 °C) (08/27/24 0742)  Pulse: (!) 111 (08/27/24 0807)  Resp: 16 (08/27/24 0807)  BP: 119/60 (08/27/24 0742)  SpO2: 98 % (08/27/24 0807) Vital Signs (24h Range):  Temp:  [97.5 °F (36.4 °C)-98.8 °F (37.1 °C)] 98.1 °F (36.7 °C)  Pulse:  [102-133] 111  Resp:  [16-26] 16  SpO2:  [93 %-100 %] 98 %  BP: (113-163)/(58-93) 119/60     Weight: 109.5 kg (241 lb 6.5 oz)  Body mass index is 40.17 kg/m².  Patient's last menstrual period was 08/05/2024.    I&O (Last 24H):    Intake/Output Summary (Last 24 hours) at 8/27/2024 0809  Last data filed at 8/27/2024 0716  Gross per 24 hour   Intake 1506 ml   Output 350 ml   Net 1156 ml         Laboratory:  Recent Lab Results         08/27/24  0546        Albumin 1.7       ALP 88       ALT 24       Anion Gap 9       AST 25       Baso # 0.03       Basophil % 0.2       BILIRUBIN TOTAL  1.3  Comment: For infants and newborns, interpretation of results should be based  on gestational age, weight and in agreement with clinical  observations.    Premature Infant recommended reference ranges:  Up to 24 hours.............<8.0 mg/dL  Up to 48 hours............<12.0 mg/dL  3-5 days..................<15.0 mg/dL  6-29 days.................<15.0 mg/dL         BUN 13       Calcium 8.0       Chloride 107       CO2 25       Creatinine 0.9       Differential Method Automated       eGFR >60       Eos # 0.0       Eos % 0.0       Glucose 174       Gran # (ANC) 10.8       Gran % 86.8       Hematocrit 36.4       Hemoglobin 11.4       Immature Grans (Abs) 0.11  Comment: Mild elevation in immature granulocytes is non specific and   can be seen in a variety of conditions including stress response,   acute inflammation, trauma and pregnancy. Correlation with other   laboratory and clinical findings is essential.         Immature Granulocytes 0.9       Lymph # 0.6       Lymph % 4.4       MCH 26.5       MCHC 31.3       MCV 85       Mono # 1.0       Mono % 7.7       MPV 8.7       nRBC 0       Platelet Count 438       Potassium 3.9       PROTEIN TOTAL 5.2       RBC 4.30       RDW 13.7       Sodium 141       WBC 12.40             I have personallly reviewed all pertinent lab results from the last 24 hours.    Diagnostic Results:  Labs: Reviewed     Physical Exam:   Constitutional: She is oriented to person, place, and time. She appears well-developed.    HENT:   Head: Normocephalic.    Eyes: Pupils are equal, round, and reactive to light.     Cardiovascular:  Normal rate and regular rhythm.             Pulmonary/Chest: Effort normal and breath sounds normal.        Abdominal: Soft. She exhibits distension. She exhibits no abdominal incision (wound vac in place;). There is no rebound and no guarding.   No bowel sounds present             Musculoskeletal: Normal range of motion.       Neurological: She is alert and oriented to  person, place, and time.    Skin: Skin is warm and dry.    Psychiatric: She has a normal mood and affect. Her behavior is normal. Judgment and thought content normal.        Review of Systems

## 2024-08-27 NOTE — OP NOTE
[facility] - Surgery ([facility])  Colon and Rectal Surgery  Operative Note    SUMMARY     Date of Procedure: 8/26/2024     Procedure: Procedure(s) (LRB):  LAPAROSCOPY, DIAGNOSTIC (N/A)  EXCISION, SMALL INTESTINE (N/A)  APPLICATION, ACELLULAR HUMAN DERMAL ALLOGRAFT (N/A)  LAPAROTOMY, EXPLORATORY (N/A)  LAPAROTOMY, EXPLORATORY (N/A)  APPLICATION, WOUND VAC (N/A)  BLOCK, TRANSVERSUS ABDOMINIS PLANE (Bilateral)     Surgeons and Role:  Panel 2:     * Theresa Morrow MD - Primary    Assistant: Julia Lemus MD    Pre-Operative Diagnosis: S/P hysterectomy [Z90.710]  Partial small bowel obstruction [K56.600]    Post-Operative Diagnosis: Post-Op Diagnosis Codes:     * S/P hysterectomy [Z90.710]     * Partial small bowel obstruction [K56.600]    Anesthesia: General    Operative Findings (including complications, if any): small bowel injury in mid small bowel near mesentery. Juan contamination throughout abdomen    Description of Technical Procedures:  Was identified in the preoperative holding area.  After informed consent was obtained she was taken to the operating room placed on the operating table in the supine position.  General anesthesia was administered.  She was then placed in lithotomy position.  After appropriate IV access was obtained by anesthesia she was then prepped and draped in the usual fashion.  A time-out was performed indicating the correct patient procedure and operative site and all the room were in agreement.  Preoperative antibiotics had been given.    Stab incision at valderrama's point was made and the abdomen was accessed with a Veress needle.  Pneumoperitoneum was achieved without any compromise to underlying cardiorespiratory status.  At the prior trocar incision and 5 mm laparoscopic trocar was advanced into the abdomen under direct visualization using the Optiview technique.  The abdomen was accessed with no evidence of underlying iatrogenic injury.  Upon inspection there was noted to be matting of  the small bowel with dense inflammatory adhesions.  Two additional 5 mm laparoscopic trocars were placed under direct visualization.  The small bowel was gently dissected out of the pelvis.  Dr. Lemus inspected the pelvis I did not see any evidence of iatrogenic injury secondary to prior hysterectomy.  The cecum was identified in the terminal ileum was identified.  A gently retracted the small bowel and ran the small bowel proximally from the terminal ileum.  There were dense inflammatory adhesions.  These were gently dissected.  Upon this dissection there was noted to be small bowel contents within the loops of small bowel.  At this time I elected to perform a laparotomy to further investigate.      Laparotomy incision was made and dissected through the skin and subcutaneous tissue.  The fascia was incised and the abdomen was entered.  The fascial incision was opened for the length of the incision.  An Armando wound protector was placed in the wound.  The small bowel was gently exteriorized.  Identified the terminal ileum.  A gently ran the small bowel from the terminal ileum and encountered inflammatory adhesions that is  easily.  In the proximal ileum there was identified a small bowel enterotomy near the mesenteric side as the source of contamination.  I continued inspection of the abdomen all the way to the ligament of Treitz.  The right colon transverse descending sigmoid and rectum were all also investigated with no evidence of underlying injury or source of contamination.  The bowel around the enterotomy was inflamed and friable however there was healthy-appearing proximal and distal bowel.  I elected to perform a small-bowel resection with a side-to-side anti mesenteric anastomosis.  A window in the mesentery was made at the sites of resection.  In stapled with a 75 mm blue load GWYN stapler.  The intervening mesentery was ligated with the LigaSure.  Side-to-side anti mesenteric isoperistaltic  anastomosis was then created.  Common channel was created with a an additional firing of the 75 mm stapler.  The anastomosis was inspected and found to be widely patent and hemostatic.  The common channel was then closed with an additional firing of the stapler.  The staple line was then oversewn using 3-0 Vicryl in a Lembert fashion.  The abdomen was then copiously irrigated until irrigation returned clear.  An AmnioFix was placed on the anastomosis and the omentum was draped over the anastomosis as well.  Fascia was then closed using a running looped 0 PDS.  The subcutaneous tissue was irrigated and a single black sponge was placed within the wound and a adherent dressing was placed.  It was placed a negative 125 mmHg suctioned.  There was a good seal.  All counts were correct.  The patient tolerated the procedure well.    Significant Surgical Tasks Conducted by the Assistant(s), if Applicable: Assisted with all portions of the operation as it was required to help with the positioning, exposure and closure to complete the operation       Estimated Blood Loss (EBL): * No values recorded between 8/26/2024  6:20 PM and 8/26/2024  7:37 PM *           Implants:   Implant Name Type Inv. Item Serial No.  Lot No. LRB No. Used Action   [id number] Amniotic  [id number] [employer]  N/A 1 Implanted       Specimens:   Specimen (24h ago, onward)       Start     Ordered    08/26/24 1902  Specimen to Pathology, Surgery General Surgery  Once        Comments: Pre-op Diagnosis: S/P hysterectomy [Z90.710]Partial small bowel obstruction [K56.600]Procedure(s):LAPAROSCOPY, DIAGNOSTICEXCISION, SMALL INTESTINE Number of specimens: 1Name of specimens: 1. Small bowel (perm)     References:    Click here for ordering Quick Tip   Question Answer Comment   Procedure Type: General Surgery    Specimen Class: Routine/Screening    Release to patient Immediate        08/26/24 1919                     Condition:  Good    Malignancy: Procedure performed for malignancy no    Disposition: PACU - hemodynamically stable.    Attestation: I performed the procedure.

## 2024-08-27 NOTE — PLAN OF CARE
Bed locked, in lowest position. Call bell in reach. Purposeful rounding done. Cardiac monitoring in use. Antibiotics administered as ordered. Chart check complete. Will continue with plan of care.

## 2024-08-28 LAB
OHS QRS DURATION: 74 MS
OHS QTC CALCULATION: 435 MS

## 2024-08-28 PROCEDURE — 25000003 PHARM REV CODE 250: Performed by: OBSTETRICS & GYNECOLOGY

## 2024-08-28 PROCEDURE — 11000001 HC ACUTE MED/SURG PRIVATE ROOM

## 2024-08-28 PROCEDURE — 63600175 PHARM REV CODE 636 W HCPCS: Performed by: SURGERY

## 2024-08-28 PROCEDURE — 93010 ELECTROCARDIOGRAM REPORT: CPT | Mod: ,,, | Performed by: INTERNAL MEDICINE

## 2024-08-28 PROCEDURE — 63600175 PHARM REV CODE 636 W HCPCS: Performed by: STUDENT IN AN ORGANIZED HEALTH CARE EDUCATION/TRAINING PROGRAM

## 2024-08-28 PROCEDURE — 25000003 PHARM REV CODE 250: Performed by: PHYSICIAN ASSISTANT

## 2024-08-28 PROCEDURE — 63600175 PHARM REV CODE 636 W HCPCS: Performed by: OBSTETRICS & GYNECOLOGY

## 2024-08-28 PROCEDURE — 25000003 PHARM REV CODE 250: Performed by: STUDENT IN AN ORGANIZED HEALTH CARE EDUCATION/TRAINING PROGRAM

## 2024-08-28 PROCEDURE — 25000003 PHARM REV CODE 250: Performed by: SURGERY

## 2024-08-28 PROCEDURE — 93005 ELECTROCARDIOGRAM TRACING: CPT

## 2024-08-28 RX ORDER — HYDROMORPHONE HYDROCHLORIDE 2 MG/ML
0.2 INJECTION, SOLUTION INTRAMUSCULAR; INTRAVENOUS; SUBCUTANEOUS EVERY 6 HOURS PRN
Status: DISCONTINUED | OUTPATIENT
Start: 2024-08-28 | End: 2024-08-30 | Stop reason: HOSPADM

## 2024-08-28 RX ORDER — ACETAMINOPHEN 500 MG
1000 TABLET ORAL EVERY 8 HOURS
Status: DISCONTINUED | OUTPATIENT
Start: 2024-08-28 | End: 2024-08-30 | Stop reason: HOSPADM

## 2024-08-28 RX ORDER — KETOROLAC TROMETHAMINE 30 MG/ML
15 INJECTION, SOLUTION INTRAMUSCULAR; INTRAVENOUS EVERY 6 HOURS
Status: DISCONTINUED | OUTPATIENT
Start: 2024-08-28 | End: 2024-08-30 | Stop reason: HOSPADM

## 2024-08-28 RX ORDER — CARVEDILOL 12.5 MG/1
12.5 TABLET ORAL 2 TIMES DAILY
Status: DISCONTINUED | OUTPATIENT
Start: 2024-08-28 | End: 2024-08-30 | Stop reason: HOSPADM

## 2024-08-28 RX ORDER — METHOCARBAMOL 500 MG/1
1000 TABLET, FILM COATED ORAL 3 TIMES DAILY
Status: DISCONTINUED | OUTPATIENT
Start: 2024-08-28 | End: 2024-08-30 | Stop reason: HOSPADM

## 2024-08-28 RX ORDER — OXYCODONE HYDROCHLORIDE 5 MG/1
5 TABLET ORAL EVERY 4 HOURS PRN
Status: DISCONTINUED | OUTPATIENT
Start: 2024-08-28 | End: 2024-08-30 | Stop reason: HOSPADM

## 2024-08-28 RX ADMIN — METHOCARBAMOL 1000 MG: 500 TABLET ORAL at 08:08

## 2024-08-28 RX ADMIN — CARVEDILOL 12.5 MG: 12.5 TABLET, FILM COATED ORAL at 09:08

## 2024-08-28 RX ADMIN — DEXTROSE AND SODIUM CHLORIDE: 5; 900 INJECTION, SOLUTION INTRAVENOUS at 03:08

## 2024-08-28 RX ADMIN — PIPERACILLIN SODIUM AND TAZOBACTAM SODIUM 4.5 G: 4; .5 INJECTION, POWDER, FOR SOLUTION INTRAVENOUS at 03:08

## 2024-08-28 RX ADMIN — PIPERACILLIN SODIUM AND TAZOBACTAM SODIUM 4.5 G: 4; .5 INJECTION, POWDER, FOR SOLUTION INTRAVENOUS at 09:08

## 2024-08-28 RX ADMIN — MUPIROCIN: 20 OINTMENT TOPICAL at 09:08

## 2024-08-28 RX ADMIN — OXYCODONE HYDROCHLORIDE 5 MG: 5 TABLET ORAL at 08:08

## 2024-08-28 RX ADMIN — KETOROLAC TROMETHAMINE 15 MG: 30 INJECTION, SOLUTION INTRAMUSCULAR at 06:08

## 2024-08-28 RX ADMIN — ACETAMINOPHEN 1000 MG: 500 TABLET ORAL at 06:08

## 2024-08-28 RX ADMIN — PIPERACILLIN SODIUM AND TAZOBACTAM SODIUM 4.5 G: 4; .5 INJECTION, POWDER, FOR SOLUTION INTRAVENOUS at 05:08

## 2024-08-28 RX ADMIN — METHOCARBAMOL 500 MG: 100 INJECTION INTRAMUSCULAR; INTRAVENOUS at 05:08

## 2024-08-28 NOTE — NURSING
Pt. Called saying that her PICC line came out. Went to assess it and sutures were intact, blood return present in all 3 lumen. Dressing was slightly off, told patient will get new dressing and come right back. Left room for 2 minutes when I came back NG tube was out a couple inches. I stopped suction and told her I will replace it. Pt. Refuse to have new NG tube place and wanted to have the same one pushed back in. I did just that and placed order for AP chest xray. Currently waiting for result to start back suction.

## 2024-08-28 NOTE — SUBJECTIVE & OBJECTIVE
Interval History: minimal NGT output. Passing flatus. Has been OOB ambulating. Pain well controlled    Medications:  Continuous Infusions:   D5 and 0.9% NaCl   Intravenous Continuous 100 mL/hr at 08/28/24 0351 New Bag at 08/28/24 0351    hydromorphone in 0.9 % NaCl 6 mg/30 ml   Intravenous Continuous   Rate Change at 08/27/24 1330     Scheduled Meds:   carvediloL  12.5 mg Oral BID    mupirocin   Nasal BID    piperacillin-tazobactam (Zosyn) IV (PEDS and ADULTS) (extended infusion is not appropriate)  4.5 g Intravenous Q8H     PRN Meds:  Current Facility-Administered Medications:     0.9% NaCl, , Intravenous, PRN    butamben-TETRAcaine-benzocaine 2%-2%-14% (200 mg/sec), 1 spray, Topical (Top), Q4H PRN    dextrose 10%, 12.5 g, Intravenous, PRN    dextrose 10%, 25 g, Intravenous, PRN    naloxone, 0.02 mg, Intravenous, PRN    ondansetron, 4 mg, Intravenous, Q6H PRN     Review of patient's allergies indicates:   Allergen Reactions    Sulfa (sulfonamide antibiotics) Other (See Comments)    Sulfamethoxazole-trimethoprim     Trimethoprim Other (See Comments)     Objective:     Vital Signs (Most Recent):  Temp: 97.8 °F (36.6 °C) (08/28/24 1135)  Pulse: 108 (08/28/24 1402)  Resp: 18 (08/28/24 1135)  BP: (!) 143/63 (08/28/24 1445)  SpO2: 99 % (08/28/24 1402) Vital Signs (24h Range):  Temp:  [97.5 °F (36.4 °C)-98.3 °F (36.8 °C)] 97.8 °F (36.6 °C)  Pulse:  [] 108  Resp:  [16-23] 18  SpO2:  [91 %-100 %] 99 %  BP: (123-176)/(61-83) 143/63     Weight: 100.8 kg (222 lb 3.6 oz)  Body mass index is 36.98 kg/m².    Intake/Output - Last 3 Shifts         08/26 0700 08/27 0659 08/27 0700 08/28 0659 08/28 0700 08/29 0659    I.V. (mL/kg)  10 (0.1) 4 (0)    IV Piggyback 1500      Total Intake(mL/kg) 1500 (13.7) 10 (0.1) 4 (0)    Urine (mL/kg/hr) 300 (0.1)      Drains  50     Total Output 300 50     Net +1200 -40 +4           Urine Occurrence  4 x              Physical Exam  Constitutional:       Appearance: She is well-developed.    HENT:      Head: Normocephalic and atraumatic.   Eyes:      Conjunctiva/sclera: Conjunctivae normal.      Pupils: Pupils are equal, round, and reactive to light.   Neck:      Thyroid: No thyromegaly.   Cardiovascular:      Rate and Rhythm: Normal rate and regular rhythm.   Pulmonary:      Effort: Pulmonary effort is normal. No respiratory distress.   Abdominal:      General: There is no distension.      Palpations: Abdomen is soft. There is no mass.      Tenderness: There is no abdominal tenderness.      Comments: Wound vac in place with good seal   Musculoskeletal:         General: No tenderness. Normal range of motion.      Cervical back: Normal range of motion.   Skin:     General: Skin is warm and dry.      Capillary Refill: Capillary refill takes less than 2 seconds.   Neurological:      General: No focal deficit present.      Mental Status: She is alert and oriented to person, place, and time.          Significant Labs:  I have reviewed all pertinent lab results within the past 24 hours.  CBC:   Recent Labs   Lab 08/27/24  0546   WBC 12.40   RBC 4.30   HGB 11.4*   HCT 36.4*      MCV 85   MCH 26.5*   MCHC 31.3*     CMP:   Recent Labs   Lab 08/27/24  0546   *   CALCIUM 8.0*   ALBUMIN 1.7*   PROT 5.2*      K 3.9   CO2 25      BUN 13   CREATININE 0.9   ALKPHOS 88   ALT 24   AST 25   BILITOT 1.3*       Significant Diagnostics:  I have reviewed all pertinent imaging results/findings within the past 24 hours.

## 2024-08-28 NOTE — PROGRESS NOTES
[facility]  W[facility] Care    Patient Name:  Estefany Berumen   MRN:  [id number]  Date: 8/28/2024  Diagnosis: S/P exploratory laparotomy    History:     Past Medical History:   Diagnosis Date    Hypertension     Myocardial infarction     Uterine prolapse 01/05/2024       Social History     Socioeconomic History    Marital status:    Tobacco Use    Smoking status: Never    Smokeless tobacco: Never   Substance and Sexual Activity    Alcohol use: Not Currently    Drug use: Never    Sexual activity: Yes     Partners: Male     Birth control/protection: Implant     Social Determinants of Health     Financial Resource Strain: Patient Declined (8/16/2024)    Overall Financial Resource Strain (CARDIA)     Difficulty of Paying Living Expenses: Patient declined   Food Insecurity: Patient Declined (8/16/2024)    Hunger Vital Sign     Worried About Running Out of Food in the Last Year: Patient declined     Ran Out of Food in the Last Year: Patient declined   Physical Activity: Patient Declined (8/16/2024)    Exercise Vital Sign     Days of Exercise per Week: Patient declined     Minutes of Exercise per Session: Patient declined   Stress: Patient Declined (8/16/2024)    [employer] - Occupational Stress Questionnaire     Feeling of Stress : Patient declined   Housing Stability: Unknown (8/16/2024)    Housing Stability Vital Sign     Unable to Pay for Housing in the Last Year: Patient declined       Precautions:     Allergies as of 08/25/2024 - Reviewed 08/25/2024   Allergen Reaction Noted    Sulfa (sulfonamide antibiotics) Other (See Comments) 07/19/2018    Sulfamethoxazole-trimethoprim  09/01/2022    Trimethoprim Other (See Comments) 04/21/2020       [facility] Assessment Details/Treatment     Follow up for wound vac management and dressing change.  Patient is POD2 from laparotomy procedure per Dr. Morrow.   Patient is awake and alert and premedicated for pain by self with PCA.    Wound vac  paused, clamped, disconnected. Dressing gently removed.   Surgical incision to midline abdomen - measures 15x4.5x3cm. wound bed moist red and yellow adipose tissue, small amount serosanguinous drainage noted. Wound photo would not upload into EPIC. No slough or necrotic tissue, no erythema, no purulence present.     Wound cleansed with vashe and patted dry. Edel wound skin painted with cavilon. Wound filled with 1 piece black foam and sealed with drape. Sensatrac pad and tubing applied and attached to I wound vac ULTA at continuous -125 mmHg suction with good seal.   Patient tolerated care well. Plan for next wound vac dressing change Friday, and every Tu/F to follow. Will need CM consult for HH and home wound vac prior to discharge.    Reportable conditions for Patients utilizing Negative Pressure Wound Vac Therapy:  1. Loss of seal, raised foam, or wound drainage suddenly decreasing in amount or stopping.  2. Bright red blood or rapid filling of the cannister  3. Periwound erythema, heat, edema, pain, elevated temperature and white blood cell count, cloudy or foul-smelling wound drainage, excess bleeding, macerated periwound skin  4. Wound drainage becoming foul smelling and cloudy  5. Inability to trouble shoot alarm for greater than two (2) hours    Primary Nurse Assessment should include the following - Document VAC in the NPWT LDA  1. Document system patency  2. Amount and description of wound fluid  3. Pain level  4. Tolerance of NPWT  5. Level of suction   6. Color of foam  7. Any air leak noted         08/28/24 0845   WOCN Assessment   WOCN Total Time (mins) 60   Visit Date 08/28/24   Visit Time 0845   Consult Type New   WOCN Speciality Wound   WOCN List wound vac   Wound surgical   Number of Wounds 1   Procedure wound vac   Intervention assessed;changed;chart review;coordination of care;team conference;orders   Teaching on-going;complication;discharge        Wound 08/26/24 2057 Incision medial Abdomen  midline   Date First Assessed/Time First Assessed: 08/26/24 2057   Present on Original Admission: No  Primary Wound Type: Incision  Orientation: medial  Location: Abdomen  Incision Type: midline  Closure Method: (c) Other (see comments)   Wound Image   (photo did not upload)   Incision WDL ex   Dressing Appearance Intact;Moist drainage   Drainage Amount Small   Drainage Characteristics/Odor Serosanguineous   Appearance Red;Yellow;Adipose;Moist   Red (%), Wound Tissue Color 80 %   Yellow (%), Wound Tissue Color 20 %   Periwound Area Intact   Wound Edges Open;Defined   Wound Length (cm) 15 cm   Wound Width (cm) 4.5 cm   Wound Depth (cm) 3 cm   Wound Volume (cm^3) 202.5 cm^3   Wound Surface Area (cm^2) 67.5 cm^2   Care Cleansed with:;Antimicrobial agent;Applied:;Skin Barrier   Dressing Changed  (wound vac)   Dressing Change Due 08/30/24        Negative Pressure Wound Therapy  08/26/24 1930 anterior   Placement Date/Time: 08/26/24 1930   Orientation: anterior  Location: Abdomen   NPWT Type Vacuum Therapy   Therapy Setting NPWT Continuous therapy   Pressure Setting NPWT 125 mmHg   Therapy Interventions NPWT Dressing changed   Sponges Inserted NPWT Black;1   Sponges Removed NPWT Black;1       Recommendations made to primary team for ongoing wound vac management, CM consult for home wound vac and  for dressing changes. . Orders placed.     08/28/2024

## 2024-08-28 NOTE — PROGRESS NOTES
[facility]  Obstetrics & Gynecology  Progress Note    Patient Name: Estefany Berumen  MRN: [id number]  Admission Date: 8/25/2024  Primary Care Provider: Lm Rdz MD  Principal Problem: S/P exploratory laparotomy    Subjective:     HPI:  49 y/o s/p ralhbso for uterine prolapse presents to ER for worsening abdominal pain.  Patient reports actually passing out due to pain.  Feels she has been unable to pass gas.  Patient reports poor appetite due to pain.  Also feels her belly has become more distended.    Interval History: POD 2 s/p washout with small bowel resection. Wound vac in place, serosanguinous output appreciated in cannister. NGT in place, to suction. Patient reports flatus and has been ambulating with assistance. No emesis today. Requesting UA due to foul odor with urination; denies frequency or dysuria. Also inquiring about decreasing or switching pain medication due to groggy/loopy feeling. Remains NPO but is eager to eat.     Scheduled Meds:   mupirocin   Nasal BID    piperacillin-tazobactam (Zosyn) IV (PEDS and ADULTS) (extended infusion is not appropriate)  4.5 g Intravenous Q8H     Continuous Infusions:   D5 and 0.9% NaCl   Intravenous Continuous 100 mL/hr at 08/28/24 0351 New Bag at 08/28/24 0351    hydromorphone in 0.9 % NaCl 6 mg/30 ml   Intravenous Continuous   Rate Change at 08/27/24 1330     PRN Meds:  Current Facility-Administered Medications:     0.9% NaCl, , Intravenous, PRN    butamben-TETRAcaine-benzocaine 2%-2%-14% (200 mg/sec), 1 spray, Topical (Top), Q4H PRN    dextrose 10%, 12.5 g, Intravenous, PRN    dextrose 10%, 25 g, Intravenous, PRN    naloxone, 0.02 mg, Intravenous, PRN    ondansetron, 4 mg, Intravenous, Q6H PRN    Review of patient's allergies indicates:   Allergen Reactions    Sulfa (sulfonamide antibiotics) Other (See Comments)    Sulfamethoxazole-trimethoprim     Trimethoprim Other (See Comments)       Objective:     Vital Signs (Most Recent):  Temp: 97.8 °F  (36.6 °C) (08/28/24 0824)  Pulse: 101 (08/28/24 0824)  Resp: (!) 23 (08/28/24 0830)  BP: (!) 145/82 (08/28/24 0824)  SpO2: 100 % (08/28/24 0824) Vital Signs (24h Range):  Temp:  [97.5 °F (36.4 °C)-98.3 °F (36.8 °C)] 97.8 °F (36.6 °C)  Pulse:  [] 101  Resp:  [16-26] 23  SpO2:  [97 %-100 %] 100 %  BP: (123-176)/(61-83) 145/82     Weight: 110.9 kg (244 lb 7.8 oz)  Body mass index is 40.69 kg/m².  Patient's last menstrual period was 08/05/2024.    I&O (Last 24H):    Intake/Output Summary (Last 24 hours) at 8/28/2024 0913  Last data filed at 8/28/2024 0826  Gross per 24 hour   Intake 8 ml   Output --   Net 8 ml         Laboratory:  Recent Lab Results       None          I have personallly reviewed all pertinent lab results from the last 24 hours.       Physical Exam:   Constitutional: She is oriented to person, place, and time. She appears well-developed and well-nourished.    HENT:   Head: Normocephalic and atraumatic.   NGT in place, connected to suction.    Eyes: Right eye exhibits no discharge. Left eye exhibits no discharge. No scleral icterus.      Pulmonary/Chest: Effort normal. No respiratory distress.        Abdominal: She exhibits distension and abdominal incision. There is abdominal tenderness.   Wound vac in place on abdomen, good seal appreciated. Serosanguinous output appreciated in cannister.              Musculoskeletal: Moves all extremeties.       Neurological: She is alert and oriented to person, place, and time.    Skin: Skin is warm and dry. She is not diaphoretic.             Assessment/Plan:     * S/P exploratory laparotomy; small bowel resection  Underwent takeback with washout and small bowel resection on 8/26/24.  - POD 2  - NGT in place, set to suction  - NPO, advance diet per colorectal/general surgery  - Multimodal pain control, defer to colorectal/general surgery  - Wound vac in place, wound care following  - Voiding spontaneously  - PT to see and treat  - Encouraged OOBTC, ambulation,  "and IS use        Injury of small intestine  - See "s/p exploratory laparotomy; small bowel resection" for plan    Status post laparoscopic hysterectomy  Underwent RALH/BSO on 8/22. Now s/p takeback for small bowel resection and washout.  - POD 6 s/p RALH  - Denies vaginal bleeding  - Multimodal pain control, patient requesting decrease in medication, defer to colorectal/general surgery  - Voiding spontaneously  - PT to see and treat  - Encouraged OOBTC, ambulation, and IS use  - Will continue to monitor      Hypertension  - BP stable, hold at this time        ISAEL HintonC  Obstetrics & Gynecology  [facility]    "

## 2024-08-28 NOTE — ASSESSMENT & PLAN NOTE
POD #2- s/p diagnostic laparoscopy converted to laparotomy and small bowel resection for enterotomy       -- ok to dc NGT  -- start CLD  -- IVF, can stop when tolerating PO  -- dc PCA, multimodal pain control  -- continue zosyn due to manish contamination 4d postoperatively  -- continue wound vac- wound care consult for assistance, home health  -- dc zhao   -- encourage ambulation and IS

## 2024-08-28 NOTE — ASSESSMENT & PLAN NOTE
Underwent RALH/BSO on 8/22. Now s/p takeback for small bowel resection and washout.  - POD 6 s/p RALH  - Denies vaginal bleeding  - Multimodal pain control, patient requesting decrease in medication, defer to colorectal/general surgery  - Voiding spontaneously  - PT to see and treat  - Encouraged OOBTC, ambulation, and IS use  - Will continue to monitor

## 2024-08-28 NOTE — NURSING
Patient transferred to room 543, report given to nurse Benoit. Kaycee assumed care at approximately 0822.

## 2024-08-28 NOTE — PROGRESS NOTES
[facility]  General Surgery  Progress Note    Subjective:     History of Present Illness:  Estefany Berumen is a 48 y.o. female w/ history Robotic KRISTY/BSO on 8/22/2024 w/ Dr. Bari Dwyer on whom we were consulted for SBO vs ileus.  Pt presented to the ED last night c/o abdominal pain and distention and reported syncopal episode 2/2 pain.  She states that she has had consistent pain since surgery and has not had any flatus or bowel function since the surgery.  In the ED she was afebrile, tachy to 120s, and borderline hypotensive with BP in the 90s systolic.  Labs revealed a leukocytosis of 14K but were otherwise normal.  CT scan was done which showed moderate generalized free air and free fluid and mildly dilated left-sided loops of small bowel which could reflect ileus or obstruction.  She was admitted to gyn and we were consulted for assistance w/ SBO management.  NGT was placed in the ER and has had minimal output since then as far as I can tell.  Today she states she feels a little better and that her abdomen feels less distended.  She is hungry and wants to know when she can eat.        Post-Op Info:  Procedure(s) (LRB):  LAPAROSCOPY, DIAGNOSTIC (N/A)  EXCISION, SMALL INTESTINE (N/A)  APPLICATION, ACELLULAR HUMAN DERMAL ALLOGRAFT (N/A)  LAPAROTOMY, EXPLORATORY (N/A)  LAPAROTOMY, EXPLORATORY (N/A)  APPLICATION, WOUND VAC (N/A)  BLOCK, TRANSVERSUS ABDOMINIS PLANE (Bilateral)   2 Days Post-Op     Interval History: minimal NGT output. Passing flatus. Has been OOB ambulating. Pain well controlled    Medications:  Continuous Infusions:   D5 and 0.9% NaCl   Intravenous Continuous 100 mL/hr at 08/28/24 0351 New Bag at 08/28/24 0351    hydromorphone in 0.9 % NaCl 6 mg/30 ml   Intravenous Continuous   Rate Change at 08/27/24 1330     Scheduled Meds:   carvediloL  12.5 mg Oral BID    mupirocin   Nasal BID    piperacillin-tazobactam (Zosyn) IV (PEDS and ADULTS) (extended infusion is not appropriate)  4.5 g  Intravenous Q8H     PRN Meds:  Current Facility-Administered Medications:     0.9% NaCl, , Intravenous, PRN    butamben-TETRAcaine-benzocaine 2%-2%-14% (200 mg/sec), 1 spray, Topical (Top), Q4H PRN    dextrose 10%, 12.5 g, Intravenous, PRN    dextrose 10%, 25 g, Intravenous, PRN    naloxone, 0.02 mg, Intravenous, PRN    ondansetron, 4 mg, Intravenous, Q6H PRN     Review of patient's allergies indicates:   Allergen Reactions    Sulfa (sulfonamide antibiotics) Other (See Comments)    Sulfamethoxazole-trimethoprim     Trimethoprim Other (See Comments)     Objective:     Vital Signs (Most Recent):  Temp: 97.8 °F (36.6 °C) (08/28/24 1135)  Pulse: 108 (08/28/24 1402)  Resp: 18 (08/28/24 1135)  BP: (!) 143/63 (08/28/24 1445)  SpO2: 99 % (08/28/24 1402) Vital Signs (24h Range):  Temp:  [97.5 °F (36.4 °C)-98.3 °F (36.8 °C)] 97.8 °F (36.6 °C)  Pulse:  [] 108  Resp:  [16-23] 18  SpO2:  [91 %-100 %] 99 %  BP: (123-176)/(61-83) 143/63     Weight: 100.8 kg (222 lb 3.6 oz)  Body mass index is 36.98 kg/m².    Intake/Output - Last 3 Shifts         08/26 0700 08/27 0659 08/27 0700 08/28 0659 08/28 0700 08/29 0659    I.V. (mL/kg)  10 (0.1) 4 (0)    IV Piggyback 1500      Total Intake(mL/kg) 1500 (13.7) 10 (0.1) 4 (0)    Urine (mL/kg/hr) 300 (0.1)      Drains  50     Total Output 300 50     Net +1200 -40 +4           Urine Occurrence  4 x              Physical Exam  Constitutional:       Appearance: She is well-developed.   HENT:      Head: Normocephalic and atraumatic.   Eyes:      Conjunctiva/sclera: Conjunctivae normal.      Pupils: Pupils are equal, round, and reactive to light.   Neck:      Thyroid: No thyromegaly.   Cardiovascular:      Rate and Rhythm: Normal rate and regular rhythm.   Pulmonary:      Effort: Pulmonary effort is normal. No respiratory distress.   Abdominal:      General: There is no distension.      Palpations: Abdomen is soft. There is no mass.      Tenderness: There is no abdominal tenderness.       Comments: Wound vac in place with good seal   Musculoskeletal:         General: No tenderness. Normal range of motion.      Cervical back: Normal range of motion.   Skin:     General: Skin is warm and dry.      Capillary Refill: Capillary refill takes less than 2 seconds.   Neurological:      General: No focal deficit present.      Mental Status: She is alert and oriented to person, place, and time.          Significant Labs:  I have reviewed all pertinent lab results within the past 24 hours.  CBC:   Recent Labs   Lab 08/27/24  0546   WBC 12.40   RBC 4.30   HGB 11.4*   HCT 36.4*      MCV 85   MCH 26.5*   MCHC 31.3*     CMP:   Recent Labs   Lab 08/27/24  0546   *   CALCIUM 8.0*   ALBUMIN 1.7*   PROT 5.2*      K 3.9   CO2 25      BUN 13   CREATININE 0.9   ALKPHOS 88   ALT 24   AST 25   BILITOT 1.3*       Significant Diagnostics:  I have reviewed all pertinent imaging results/findings within the past 24 hours.  Assessment/Plan:     * S/P exploratory laparotomy; small bowel resection  POD #2- s/p diagnostic laparoscopy converted to laparotomy and small bowel resection for enterotomy       -- ok to dc NGT  -- start CLD  -- IVF, can stop when tolerating PO  -- dc PCA, multimodal pain control  -- continue zosyn due to manish contamination 4d postoperatively  -- continue wound vac- wound care consult for assistance, home health  -- dc zhao   -- encourage ambulation and IS      Injury of small intestine  See exlap assessment and plan     Hypertension  -- management as per primary team         Theresa Morrow MD  General Surgery  [facility]

## 2024-08-28 NOTE — ASSESSMENT & PLAN NOTE
Underwent takeback with washout and small bowel resection on 8/26/24.  - POD 2  - NGT in place, set to suction  - NPO, advance diet per colorectal/general surgery  - Multimodal pain control, defer to colorectal/general surgery  - Wound vac in place, wound care following  - Voiding spontaneously  - PT to see and treat  - Encouraged OOBTC, ambulation, and IS use

## 2024-08-28 NOTE — SUBJECTIVE & OBJECTIVE
Interval History: POD 2 s/p washout with small bowel resection. Wound vac in place, serosanguinous output appreciated in cannister. NGT in place, to suction. Patient reports flatus and has been ambulating with assistance. No emesis today. Requesting UA due to foul odor with urination; denies frequency or dysuria. Also inquiring about decreasing or switching pain medication due to groggy/loopy feeling. Remains NPO but is eager to eat.     Scheduled Meds:   mupirocin   Nasal BID    piperacillin-tazobactam (Zosyn) IV (PEDS and ADULTS) (extended infusion is not appropriate)  4.5 g Intravenous Q8H     Continuous Infusions:   D5 and 0.9% NaCl   Intravenous Continuous 100 mL/hr at 08/28/24 0351 New Bag at 08/28/24 0351    hydromorphone in 0.9 % NaCl 6 mg/30 ml   Intravenous Continuous   Rate Change at 08/27/24 1330     PRN Meds:  Current Facility-Administered Medications:     0.9% NaCl, , Intravenous, PRN    butamben-TETRAcaine-benzocaine 2%-2%-14% (200 mg/sec), 1 spray, Topical (Top), Q4H PRN    dextrose 10%, 12.5 g, Intravenous, PRN    dextrose 10%, 25 g, Intravenous, PRN    naloxone, 0.02 mg, Intravenous, PRN    ondansetron, 4 mg, Intravenous, Q6H PRN    Review of patient's allergies indicates:   Allergen Reactions    Sulfa (sulfonamide antibiotics) Other (See Comments)    Sulfamethoxazole-trimethoprim     Trimethoprim Other (See Comments)       Objective:     Vital Signs (Most Recent):  Temp: 97.8 °F (36.6 °C) (08/28/24 0824)  Pulse: 101 (08/28/24 0824)  Resp: (!) 23 (08/28/24 0830)  BP: (!) 145/82 (08/28/24 0824)  SpO2: 100 % (08/28/24 0824) Vital Signs (24h Range):  Temp:  [97.5 °F (36.4 °C)-98.3 °F (36.8 °C)] 97.8 °F (36.6 °C)  Pulse:  [] 101  Resp:  [16-26] 23  SpO2:  [97 %-100 %] 100 %  BP: (123-176)/(61-83) 145/82     Weight: 110.9 kg (244 lb 7.8 oz)  Body mass index is 40.69 kg/m².  Patient's last menstrual period was 08/05/2024.    I&O (Last 24H):    Intake/Output Summary (Last 24 hours) at 8/28/2024  0913  Last data filed at 8/28/2024 0826  Gross per 24 hour   Intake 8 ml   Output --   Net 8 ml         Laboratory:  Recent Lab Results       None          I have personallly reviewed all pertinent lab results from the last 24 hours.       Physical Exam:   Constitutional: She is oriented to person, place, and time. She appears well-developed and well-nourished.    HENT:   Head: Normocephalic and atraumatic.   NGT in place, connected to suction.    Eyes: Right eye exhibits no discharge. Left eye exhibits no discharge. No scleral icterus.      Pulmonary/Chest: Effort normal. No respiratory distress.        Abdominal: She exhibits distension and abdominal incision. There is abdominal tenderness.   Wound vac in place on abdomen, good seal appreciated. Serosanguinous output appreciated in cannister.              Musculoskeletal: Moves all extremeties.       Neurological: She is alert and oriented to person, place, and time.    Skin: Skin is warm and dry. She is not diaphoretic.

## 2024-08-28 NOTE — PLAN OF CARE
[employer] wound vac placed in chart for wound vac. CM called [employer] ([facility]) and confirmed insurance accepted. MD notified via secure chat.

## 2024-08-29 PROBLEM — N30.01 ACUTE CYSTITIS WITH HEMATURIA: Status: ACTIVE | Noted: 2024-08-29

## 2024-08-29 LAB
AMORPH CRY URNS QL MICRO: ABNORMAL
BACTERIA #/AREA URNS HPF: ABNORMAL /HPF
BILIRUB UR QL STRIP: NEGATIVE
CLARITY UR: ABNORMAL
COLOR UR: ABNORMAL
FINAL PATHOLOGIC DIAGNOSIS: NORMAL
GLUCOSE UR QL STRIP: NEGATIVE
GROSS: NORMAL
HGB UR QL STRIP: ABNORMAL
HYALINE CASTS #/AREA URNS LPF: 0 /LPF
KETONES UR QL STRIP: ABNORMAL
LEUKOCYTE ESTERASE UR QL STRIP: ABNORMAL
Lab: NORMAL
MICROSCOPIC COMMENT: ABNORMAL
NITRITE UR QL STRIP: NEGATIVE
OHS QRS DURATION: 84 MS
OHS QTC CALCULATION: 444 MS
PH UR STRIP: 6 [PH] (ref 5–8)
POCT GLUCOSE: 105 MG/DL (ref 70–110)
POCT GLUCOSE: 114 MG/DL (ref 70–110)
POCT GLUCOSE: 82 MG/DL (ref 70–110)
PROT UR QL STRIP: ABNORMAL
RBC #/AREA URNS HPF: >100 /HPF (ref 0–4)
SP GR UR STRIP: >1.03 (ref 1–1.03)
SQUAMOUS #/AREA URNS HPF: 4 /HPF
URN SPEC COLLECT METH UR: ABNORMAL
UROBILINOGEN UR STRIP-ACNC: NEGATIVE EU/DL
WBC #/AREA URNS HPF: >100 /HPF (ref 0–5)
WBC CLUMPS URNS QL MICRO: ABNORMAL
YEAST URNS QL MICRO: ABNORMAL

## 2024-08-29 PROCEDURE — 93005 ELECTROCARDIOGRAM TRACING: CPT

## 2024-08-29 PROCEDURE — 63600175 PHARM REV CODE 636 W HCPCS: Performed by: STUDENT IN AN ORGANIZED HEALTH CARE EDUCATION/TRAINING PROGRAM

## 2024-08-29 PROCEDURE — 87086 URINE CULTURE/COLONY COUNT: CPT | Performed by: OBSTETRICS & GYNECOLOGY

## 2024-08-29 PROCEDURE — 63600175 PHARM REV CODE 636 W HCPCS: Performed by: OBSTETRICS & GYNECOLOGY

## 2024-08-29 PROCEDURE — 25000003 PHARM REV CODE 250: Performed by: STUDENT IN AN ORGANIZED HEALTH CARE EDUCATION/TRAINING PROGRAM

## 2024-08-29 PROCEDURE — 25000003 PHARM REV CODE 250: Performed by: OBSTETRICS & GYNECOLOGY

## 2024-08-29 PROCEDURE — 81000 URINALYSIS NONAUTO W/SCOPE: CPT | Performed by: OBSTETRICS & GYNECOLOGY

## 2024-08-29 PROCEDURE — 25000003 PHARM REV CODE 250: Performed by: PHYSICIAN ASSISTANT

## 2024-08-29 PROCEDURE — 11000001 HC ACUTE MED/SURG PRIVATE ROOM

## 2024-08-29 PROCEDURE — 93010 ELECTROCARDIOGRAM REPORT: CPT | Mod: ,,, | Performed by: INTERNAL MEDICINE

## 2024-08-29 PROCEDURE — 99024 POSTOP FOLLOW-UP VISIT: CPT | Mod: ,,, | Performed by: OBSTETRICS & GYNECOLOGY

## 2024-08-29 RX ORDER — GLYCERIN 1 G/1
1 SUPPOSITORY RECTAL ONCE
Status: COMPLETED | OUTPATIENT
Start: 2024-08-29 | End: 2024-08-29

## 2024-08-29 RX ORDER — LINEZOLID 2 MG/ML
600 INJECTION, SOLUTION INTRAVENOUS
Status: DISCONTINUED | OUTPATIENT
Start: 2024-08-29 | End: 2024-08-30 | Stop reason: HOSPADM

## 2024-08-29 RX ORDER — ENOXAPARIN SODIUM 100 MG/ML
40 INJECTION SUBCUTANEOUS EVERY 24 HOURS
Status: DISCONTINUED | OUTPATIENT
Start: 2024-08-29 | End: 2024-08-30 | Stop reason: HOSPADM

## 2024-08-29 RX ORDER — POLYETHYLENE GLYCOL 3350 17 G/17G
17 POWDER, FOR SOLUTION ORAL DAILY
Status: DISCONTINUED | OUTPATIENT
Start: 2024-08-29 | End: 2024-08-30 | Stop reason: HOSPADM

## 2024-08-29 RX ADMIN — ENOXAPARIN SODIUM 40 MG: 40 INJECTION SUBCUTANEOUS at 05:08

## 2024-08-29 RX ADMIN — CARVEDILOL 12.5 MG: 12.5 TABLET, FILM COATED ORAL at 09:08

## 2024-08-29 RX ADMIN — METHOCARBAMOL 1000 MG: 500 TABLET ORAL at 02:08

## 2024-08-29 RX ADMIN — KETOROLAC TROMETHAMINE 15 MG: 30 INJECTION, SOLUTION INTRAMUSCULAR at 05:08

## 2024-08-29 RX ADMIN — LINEZOLID 600 MG: 600 INJECTION, SOLUTION INTRAVENOUS at 08:08

## 2024-08-29 RX ADMIN — OXYCODONE HYDROCHLORIDE 5 MG: 5 TABLET ORAL at 08:08

## 2024-08-29 RX ADMIN — CARVEDILOL 12.5 MG: 12.5 TABLET, FILM COATED ORAL at 08:08

## 2024-08-29 RX ADMIN — LINEZOLID 600 MG: 600 INJECTION, SOLUTION INTRAVENOUS at 10:08

## 2024-08-29 RX ADMIN — OXYCODONE HYDROCHLORIDE 5 MG: 5 TABLET ORAL at 01:08

## 2024-08-29 RX ADMIN — GLYCERIN 1 SUPPOSITORY: 2 SUPPOSITORY RECTAL at 05:08

## 2024-08-29 RX ADMIN — METHOCARBAMOL 1000 MG: 500 TABLET ORAL at 09:08

## 2024-08-29 RX ADMIN — PIPERACILLIN SODIUM AND TAZOBACTAM SODIUM 4.5 G: 4; .5 INJECTION, POWDER, FOR SOLUTION INTRAVENOUS at 05:08

## 2024-08-29 RX ADMIN — GLYCERIN 1 SUPPOSITORY: 2 SUPPOSITORY RECTAL at 02:08

## 2024-08-29 RX ADMIN — MUPIROCIN: 20 OINTMENT TOPICAL at 08:08

## 2024-08-29 RX ADMIN — METHOCARBAMOL 1000 MG: 500 TABLET ORAL at 08:08

## 2024-08-29 RX ADMIN — ACETAMINOPHEN 1000 MG: 500 TABLET ORAL at 10:08

## 2024-08-29 RX ADMIN — POLYETHYLENE GLYCOL 3350 17 G: 17 POWDER, FOR SOLUTION ORAL at 02:08

## 2024-08-29 RX ADMIN — ACETAMINOPHEN 1000 MG: 500 TABLET ORAL at 05:08

## 2024-08-29 RX ADMIN — ACETAMINOPHEN 1000 MG: 500 TABLET ORAL at 02:08

## 2024-08-29 RX ADMIN — PIPERACILLIN SODIUM AND TAZOBACTAM SODIUM 4.5 G: 4; .5 INJECTION, POWDER, FOR SOLUTION INTRAVENOUS at 02:08

## 2024-08-29 RX ADMIN — OXYCODONE HYDROCHLORIDE 5 MG: 5 TABLET ORAL at 09:08

## 2024-08-29 RX ADMIN — KETOROLAC TROMETHAMINE 15 MG: 30 INJECTION, SOLUTION INTRAMUSCULAR at 12:08

## 2024-08-29 RX ADMIN — DEXTROSE AND SODIUM CHLORIDE: 5; 900 INJECTION, SOLUTION INTRAVENOUS at 05:08

## 2024-08-29 RX ADMIN — KETOROLAC TROMETHAMINE 15 MG: 30 INJECTION, SOLUTION INTRAMUSCULAR at 02:08

## 2024-08-29 RX ADMIN — PIPERACILLIN SODIUM AND TAZOBACTAM SODIUM 4.5 G: 4; .5 INJECTION, POWDER, FOR SOLUTION INTRAVENOUS at 10:08

## 2024-08-29 RX ADMIN — MUPIROCIN: 20 OINTMENT TOPICAL at 10:08

## 2024-08-29 NOTE — ASSESSMENT & PLAN NOTE
Underwent RALH/BSO on 8/22. Now s/p takeback for small bowel resection and washout.  - POD 7 s/p RALH  - Denies overt vaginal bleeding, although she states that it's hard to tell  - Optimize multimodal pain control  - Voiding spontaneously  - PT to see and treat  - Encouraged OOBTC, ambulation, and IS use  - Will continue to monitor

## 2024-08-29 NOTE — PLAN OF CARE
Discussed poc with pt, pt verbalized understanding    Purposeful rounding every 2hours    VS wnl  Cardiac monitoring in use, pt is NSR, tele monitor # [id number]  Fall precautions in place, remains injury free  Pt c/o pain 7/10  Pain and nausea under control with PRN meds    IVFs  Accurate I&Os  Abx given as prescribed  Bed locked at lowest position  Call light within reach    Chart check complete  Will cont with POC

## 2024-08-29 NOTE — SUBJECTIVE & OBJECTIVE
Interval History: continues to pass flatus. Pain better controlled today. Tolerated CLD without nausea or emesis    Medications:  Continuous Infusions:   D5 and 0.9% NaCl   Intravenous Continuous 100 mL/hr at 08/29/24 0548 New Bag at 08/29/24 0548     Scheduled Meds:   acetaminophen  1,000 mg Oral Q8H    carvediloL  12.5 mg Oral BID    glycerin adult  1 suppository Rectal Once    ketorolac  15 mg Intravenous Q6H    linezolid  600 mg Intravenous Q12H    methocarbamoL  1,000 mg Oral TID    mupirocin   Nasal BID    piperacillin-tazobactam (Zosyn) IV (PEDS and ADULTS) (extended infusion is not appropriate)  4.5 g Intravenous Q8H    polyethylene glycol  17 g Oral Daily     PRN Meds:  Current Facility-Administered Medications:     0.9% NaCl, , Intravenous, PRN    butamben-TETRAcaine-benzocaine 2%-2%-14% (200 mg/sec), 1 spray, Topical (Top), Q4H PRN    dextrose 10%, 12.5 g, Intravenous, PRN    dextrose 10%, 25 g, Intravenous, PRN    HYDROmorphone, 0.2 mg, Intravenous, Q6H PRN    naloxone, 0.02 mg, Intravenous, PRN    ondansetron, 4 mg, Intravenous, Q6H PRN    oxyCODONE, 5 mg, Oral, Q4H PRN     Review of patient's allergies indicates:   Allergen Reactions    Sulfa (sulfonamide antibiotics) Other (See Comments)    Sulfamethoxazole-trimethoprim     Trimethoprim Other (See Comments)     Objective:     Vital Signs (Most Recent):  Temp: 98 °F (36.7 °C) (08/29/24 1443)  Pulse: 98 (08/29/24 1443)  Resp: 17 (08/29/24 1443)  BP: (!) 147/75 (08/29/24 1443)  SpO2: 97 % (08/29/24 1443) Vital Signs (24h Range):  Temp:  [97.5 °F (36.4 °C)-98.6 °F (37 °C)] 98 °F (36.7 °C)  Pulse:  [] 98  Resp:  [15-20] 17  SpO2:  [97 %-100 %] 97 %  BP: (137-171)/(69-80) 147/75     Weight: 106.4 kg (234 lb 9.1 oz)  Body mass index is 39.03 kg/m².    Intake/Output - Last 3 Shifts         08/27 0700 08/28 0659 08/28 0700 08/29 0659 08/29 0700 08/30 0659    I.V. (mL/kg) 10 (0.1) 6 (0.1)     IV Piggyback       Total Intake(mL/kg) 10 (0.1) 6 (0.1)      Urine (mL/kg/hr)       Drains 50      Total Output 50      Net -40 +6            Urine Occurrence 4 x 1 x 1 x             Physical Exam  Constitutional:       Appearance: She is well-developed.   HENT:      Head: Normocephalic and atraumatic.   Eyes:      Conjunctiva/sclera: Conjunctivae normal.      Pupils: Pupils are equal, round, and reactive to light.   Neck:      Thyroid: No thyromegaly.   Cardiovascular:      Rate and Rhythm: Normal rate and regular rhythm.   Pulmonary:      Effort: Pulmonary effort is normal. No respiratory distress.   Abdominal:      General: There is distension (improved from prior).      Palpations: Abdomen is soft. There is no mass.      Tenderness: There is abdominal tenderness (appropriate, no rebound or guarding).      Comments: Wound vac in place with good seal   Musculoskeletal:         General: No tenderness. Normal range of motion.      Cervical back: Normal range of motion.   Skin:     General: Skin is warm and dry.      Capillary Refill: Capillary refill takes less than 2 seconds.   Neurological:      General: No focal deficit present.      Mental Status: She is alert and oriented to person, place, and time.          Significant Labs:  I have reviewed all pertinent lab results within the past 24 hours.  CBC:   Recent Labs   Lab 08/27/24  0546   WBC 12.40   RBC 4.30   HGB 11.4*   HCT 36.4*      MCV 85   MCH 26.5*   MCHC 31.3*     CMP:   Recent Labs   Lab 08/27/24  0546   *   CALCIUM 8.0*   ALBUMIN 1.7*   PROT 5.2*      K 3.9   CO2 25      BUN 13   CREATININE 0.9   ALKPHOS 88   ALT 24   AST 25   BILITOT 1.3*       Significant Diagnostics:  I have reviewed all pertinent imaging results/findings within the past 24 hours.

## 2024-08-29 NOTE — PLAN OF CARE
"   08/29/24 1516   Rounds   Attendance ;Charge nurse;Physical therapist   Discharge Plan A Home Health   Why the patient remains in the hospital Requires continued medical care   Transition of Care Barriers None       Assessment/Plan:      * S/P exploratory laparotomy; small bowel resection  Underwent takeback with washout and small bowel resection on 8/26/24.  - POD 3  - Tolerating CLD, advance diet per colorectal/general surgery  - Optimize ultimodal pain control, defer to colorectal/general surgery  - Zosyn x 4 days post-op   - Wound vac in place, wound care following  - Voiding spontaneously  - PT to see and treat  - Encouraged OOBTC, ambulation, and IS use           Injury of small intestine  - See "s/p exploratory laparotomy; small bowel resection" for plan     Status post laparoscopic hysterectomy  Underwent RALH/BSO on 8/22. Now s/p takeback for small bowel resection and washout.  - POD 7 s/p RALH  - Denies overt vaginal bleeding, although she states that it's hard to tell  - Optimize multimodal pain control  - Voiding spontaneously  - PT to see and treat  - Encouraged OOBTC, ambulation, and IS use  - Will continue to monitor        Hypertension  - Continue home Carvedilol         "

## 2024-08-29 NOTE — ANESTHESIA POSTPROCEDURE EVALUATION
Anesthesia Post Evaluation    Patient: Estefany Berumen    Procedure(s) Performed: Procedure(s) (LRB):  XI ROBOTIC HYSTERECTOMY,WITH SALPINGO-OOPHORECTOMY (Bilateral)  REMOVAL, INTRAUTERINE DEVICE (N/A)    Final Anesthesia Type: general      Patient location during evaluation: PACU  Patient participation: Yes- Able to Participate  Level of consciousness: awake and alert, oriented and awake  Post-procedure vital signs: reviewed and stable  Pain management: adequate  Airway patency: patent    PONV status at discharge: No PONV  Anesthetic complications: no      Cardiovascular status: blood pressure returned to baseline  Respiratory status: unassisted  Hydration status: euvolemic  Follow-up not needed.              Vitals Value Taken Time   /85 08/22/24 1200   Temp 36.5 °C (97.7 °F) 08/22/24 1040   Pulse 93 08/22/24 1209   Resp 18 08/22/24 1208   SpO2 98 % 08/22/24 1209   Vitals shown include unfiled device data.      Event Time   Out of Recovery 12:11:03         Pain/Yoanna Score: Pain Rating Prior to Med Admin: 6 (8/29/2024  5:59 AM)  Pain Rating Post Med Admin: 6 (8/29/2024  2:24 AM)  Yoanna Score: 10 (8/28/2024  7:05 PM)

## 2024-08-29 NOTE — PLAN OF CARE
08/29/24 1122   Post-Acute Status   Post-Acute Authorization HME;Home Health   HME Status Pending post-acute provider review/more information requested   Home Health Status   (pending orders)   Discharge Delays None known at this time   Discharge Plan   Discharge Plan A Home Health     Sw faxed [facility] vac order to [facility]; pending approval and delivery.     HH pending MD orders and arrangements.

## 2024-08-29 NOTE — PLAN OF CARE
Discussed poc with pt, pt verbalized understanding    Purposeful rounding     Cardiac monitoring in use tele monitor # [id number]     Fall precautions in place, remains injury free. Pt ambulates with 1 person assist and a walker    Pain  under control with PRN meds. Pt reports having pain at 10/10 on multiple occasions, but decline IV pain meds    IVFs infusing    Accurate I&Os    Abx given as prescribed    Pt with multiple reports of having foul smelling urine. Pt then reported that she was having some hematuria. UA with culture ordered and sent to the lab    Bed locked at lowest position    Call light within reach    Chart check continued    Will cont with POC

## 2024-08-29 NOTE — PROGRESS NOTES
'[facility]  Obstetrics & Gynecology  Progress Note    Patient Name: Estefany Berumen  MRN: [id number]  Admission Date: 8/25/2024  Primary Care Provider: Lm Rdz MD  Principal Problem: S/P exploratory laparotomy    Subjective:     HPI:  49 y/o s/p ralhbso for uterine prolapse presents to ER for worsening abdominal pain.  Patient reports actually passing out due to pain.  Feels she has been unable to pass gas.  Patient reports poor appetite due to pain.  Also feels her belly has become more distended.    Interval History: POD 3 s/p ex lap. Wound vac in place with good seal, serosanguinous drainage appreciated in cannister. Tolerating CLD without emesis. Reports some flatus but no bowel movement. Pain not well managed and patient tearful during exam. Struggling with ambulation today due to pain.  UA concerning for UTI, will add Linezolid to abx regimen pending cultures.     Scheduled Meds:   acetaminophen  1,000 mg Oral Q8H    carvediloL  12.5 mg Oral BID    ketorolac  15 mg Intravenous Q6H    linezolid  600 mg Intravenous Q12H    methocarbamoL  1,000 mg Oral TID    mupirocin   Nasal BID    piperacillin-tazobactam (Zosyn) IV (PEDS and ADULTS) (extended infusion is not appropriate)  4.5 g Intravenous Q8H     Continuous Infusions:   D5 and 0.9% NaCl   Intravenous Continuous 100 mL/hr at 08/29/24 0548 New Bag at 08/29/24 0548     PRN Meds:  Current Facility-Administered Medications:     0.9% NaCl, , Intravenous, PRN    butamben-TETRAcaine-benzocaine 2%-2%-14% (200 mg/sec), 1 spray, Topical (Top), Q4H PRN    dextrose 10%, 12.5 g, Intravenous, PRN    dextrose 10%, 25 g, Intravenous, PRN    HYDROmorphone, 0.2 mg, Intravenous, Q6H PRN    naloxone, 0.02 mg, Intravenous, PRN    ondansetron, 4 mg, Intravenous, Q6H PRN    oxyCODONE, 5 mg, Oral, Q4H PRN    Review of patient's allergies indicates:   Allergen Reactions    Sulfa (sulfonamide antibiotics) Other (See Comments)    Sulfamethoxazole-trimethoprim      Trimethoprim Other (See Comments)       Objective:     Vital Signs (Most Recent):  Temp: 98.2 °F (36.8 °C) (08/29/24 0749)  Pulse: 83 (08/29/24 0801)  Resp: 17 (08/29/24 0953)  BP: (!) 156/75 (08/29/24 0749)  SpO2: 97 % (08/29/24 0749) Vital Signs (24h Range):  Temp:  [97.5 °F (36.4 °C)-98.6 °F (37 °C)] 98.2 °F (36.8 °C)  Pulse:  [] 83  Resp:  [15-20] 17  SpO2:  [91 %-100 %] 97 %  BP: (143-171)/(63-81) 156/75     Weight: 106.4 kg (234 lb 9.1 oz)  Body mass index is 39.03 kg/m².  Patient's last menstrual period was 08/05/2024.    I&O (Last 24H):    Intake/Output Summary (Last 24 hours) at 8/29/2024 0956  Last data filed at 8/28/2024 1824  Gross per 24 hour   Intake 2 ml   Output --   Net 2 ml         Laboratory:  Recent Lab Results         08/29/24  0402   08/28/24  1455        Amorphous, UA Occasional         Appearance, UA Cloudy         Bacteria, UA None         Bilirubin (UA) Negative         Color, UA Orange         Glucose, UA Negative         Hyaline Casts, UA 0         Ketones, UA Trace         Leukocyte Esterase, UA 1+         Microscopic Comment SEE COMMENT  Comment: Other formed elements not mentioned in the report are not   present in the microscopic examination.            NITRITE UA Negative         Blood, UA 3+         QRS Duration   74       OHS QTC Calculation   435       pH, UA 6.0         Protein, UA 1+  Comment: Recommend a 24 hour urine protein or a urine   protein/creatinine ratio if globulin induced proteinuria is  clinically suspected.           RBC, UA >100         Spec Grav UA >1.030         Specimen UA Urine, Clean Catch         Squam Epithel, UA 4         UROBILINOGEN UA Negative         WBC Clumps, UA None         WBC, UA >100         Yeast, UA None               I have personallly reviewed all pertinent lab results from the last 24 hours.         Physical Exam:   Constitutional: She is oriented to person, place, and time. She appears well-developed and well-nourished.    HENT:  "  Head: Normocephalic and atraumatic.    Eyes: Right eye exhibits no discharge. Left eye exhibits no discharge. No scleral icterus.      Pulmonary/Chest: Effort normal. No respiratory distress.        Abdominal: She exhibits distension and abdominal incision. There is abdominal tenderness.   Wound vac in place on abdomen, good seal appreciated. Serosanguinous output appreciated in cannister.              Musculoskeletal: Moves all extremeties.       Neurological: She is alert and oriented to person, place, and time.    Skin: Skin is warm and dry. She is not diaphoretic.          Assessment/Plan:     * S/P exploratory laparotomy; small bowel resection  Underwent takeback with washout and small bowel resection on 8/26/24.  - POD 3  - Tolerating CLD, advance diet per colorectal/general surgery  - Optimize ultimodal pain control, defer to colorectal/general surgery  - Zosyn x 4 days post-op   - Wound vac in place, wound care following  - Voiding spontaneously  - PT to see and treat  - Encouraged OOBTC, ambulation, and IS use        Injury of small intestine  - See "s/p exploratory laparotomy; small bowel resection" for plan    Status post laparoscopic hysterectomy  Underwent RALH/BSO on 8/22. Now s/p takeback for small bowel resection and washout.  - POD 7 s/p RALH  - Denies overt vaginal bleeding, although she states that it's hard to tell  - Optimize multimodal pain control  - Voiding spontaneously  - PT to see and treat  - Encouraged OOBTC, ambulation, and IS use  - Will continue to monitor      Hypertension  - Continue home Carvedilol         Rahel Mckenzie PA-C  Obstetrics & Gynecology  [facility]    "

## 2024-08-29 NOTE — PT/OT/SLP PROGRESS
Physical Therapy      Patient Name:  Estefany Berumen   MRN:  [id number]    PT orders received, EVAL initiated via chart review. Presented to pt's room at 0915. Patient not seen today secondary to Patient unwilling to participate. Pt lethargic, difficult to maintain arousal, would shake head yes/no to some questions, refused all mobility at this time. Will continue efforts.    Viry Kohli, PT, DPT  8/29/2024   0915

## 2024-08-29 NOTE — PROGRESS NOTES
"Was notified by RN that patient was experiencing some left sided chest pain.  Vital signs all stable "bp:144/75 p 99: o2:99. "    Upon arrival to bedside, pt was sitting on commode, and had just passed flatus, and had a bowel movement.  States she feels much better and feels like the chest pain improved.  CV: mild tachycardia, but regular  RESP: decreased breath sounds in left lung base, but otherwise clear    I suspect pt's gas was likely causing referred chest pain since it seemed to improve s/p flatus and BM.  Will check EKG and CXR, especially with reduced LLL lung sounds.  "

## 2024-08-29 NOTE — SUBJECTIVE & OBJECTIVE
Interval History: POD 3 s/p ex lap. Wound vac in place with good seal, serosanguinous drainage appreciated in cannister. Tolerating CLD without emesis. Reports some flatus but no bowel movement. Pain not well managed and patient tearful during exam. Struggling with ambulation today due to pain.  UA concerning for UTI, will add Linezolid to abx regimen pending cultures.     Scheduled Meds:   acetaminophen  1,000 mg Oral Q8H    carvediloL  12.5 mg Oral BID    ketorolac  15 mg Intravenous Q6H    linezolid  600 mg Intravenous Q12H    methocarbamoL  1,000 mg Oral TID    mupirocin   Nasal BID    piperacillin-tazobactam (Zosyn) IV (PEDS and ADULTS) (extended infusion is not appropriate)  4.5 g Intravenous Q8H     Continuous Infusions:   D5 and 0.9% NaCl   Intravenous Continuous 100 mL/hr at 08/29/24 0548 New Bag at 08/29/24 0548     PRN Meds:  Current Facility-Administered Medications:     0.9% NaCl, , Intravenous, PRN    butamben-TETRAcaine-benzocaine 2%-2%-14% (200 mg/sec), 1 spray, Topical (Top), Q4H PRN    dextrose 10%, 12.5 g, Intravenous, PRN    dextrose 10%, 25 g, Intravenous, PRN    HYDROmorphone, 0.2 mg, Intravenous, Q6H PRN    naloxone, 0.02 mg, Intravenous, PRN    ondansetron, 4 mg, Intravenous, Q6H PRN    oxyCODONE, 5 mg, Oral, Q4H PRN    Review of patient's allergies indicates:   Allergen Reactions    Sulfa (sulfonamide antibiotics) Other (See Comments)    Sulfamethoxazole-trimethoprim     Trimethoprim Other (See Comments)       Objective:     Vital Signs (Most Recent):  Temp: 98.2 °F (36.8 °C) (08/29/24 0749)  Pulse: 83 (08/29/24 0801)  Resp: 17 (08/29/24 0953)  BP: (!) 156/75 (08/29/24 0749)  SpO2: 97 % (08/29/24 0749) Vital Signs (24h Range):  Temp:  [97.5 °F (36.4 °C)-98.6 °F (37 °C)] 98.2 °F (36.8 °C)  Pulse:  [] 83  Resp:  [15-20] 17  SpO2:  [91 %-100 %] 97 %  BP: (143-171)/(63-81) 156/75     Weight: 106.4 kg (234 lb 9.1 oz)  Body mass index is 39.03 kg/m².  Patient's last menstrual period was  08/05/2024.    I&O (Last 24H):    Intake/Output Summary (Last 24 hours) at 8/29/2024 0956  Last data filed at 8/28/2024 1824  Gross per 24 hour   Intake 2 ml   Output --   Net 2 ml         Laboratory:  Recent Lab Results         08/29/24  0402   08/28/24  1455        Amorphous, UA Occasional         Appearance, UA Cloudy         Bacteria, UA None         Bilirubin (UA) Negative         Color, UA Orange         Glucose, UA Negative         Hyaline Casts, UA 0         Ketones, UA Trace         Leukocyte Esterase, UA 1+         Microscopic Comment SEE COMMENT  Comment: Other formed elements not mentioned in the report are not   present in the microscopic examination.            NITRITE UA Negative         Blood, UA 3+         QRS Duration   74       OHS QTC Calculation   435       pH, UA 6.0         Protein, UA 1+  Comment: Recommend a 24 hour urine protein or a urine   protein/creatinine ratio if globulin induced proteinuria is  clinically suspected.           RBC, UA >100         Spec Grav UA >1.030         Specimen UA Urine, Clean Catch         Squam Epithel, UA 4         UROBILINOGEN UA Negative         WBC Clumps, UA None         WBC, UA >100         Yeast, UA None               I have personallly reviewed all pertinent lab results from the last 24 hours.         Physical Exam:   Constitutional: She is oriented to person, place, and time. She appears well-developed and well-nourished.    HENT:   Head: Normocephalic and atraumatic.    Eyes: Right eye exhibits no discharge. Left eye exhibits no discharge. No scleral icterus.      Pulmonary/Chest: Effort normal. No respiratory distress.        Abdominal: She exhibits distension and abdominal incision. There is abdominal tenderness.   Wound vac in place on abdomen, good seal appreciated. Serosanguinous output appreciated in cannister.              Musculoskeletal: Moves all extremeties.       Neurological: She is alert and oriented to person, place, and time.    Skin:  Skin is warm and dry. She is not diaphoretic.

## 2024-08-29 NOTE — PROGRESS NOTES
[facility]  General Surgery  Progress Note    Subjective:     History of Present Illness:  Estefany Berumen is a 48 y.o. female w/ history Robotic KRISTY/BSO on 8/22/2024 w/ Dr. Bari Dwyer on whom we were consulted for SBO vs ileus.  Pt presented to the ED last night c/o abdominal pain and distention and reported syncopal episode 2/2 pain.  She states that she has had consistent pain since surgery and has not had any flatus or bowel function since the surgery.  In the ED she was afebrile, tachy to 120s, and borderline hypotensive with BP in the 90s systolic.  Labs revealed a leukocytosis of 14K but were otherwise normal.  CT scan was done which showed moderate generalized free air and free fluid and mildly dilated left-sided loops of small bowel which could reflect ileus or obstruction.  She was admitted to gyn and we were consulted for assistance w/ SBO management.  NGT was placed in the ER and has had minimal output since then as far as I can tell.  Today she states she feels a little better and that her abdomen feels less distended.  She is hungry and wants to know when she can eat.        Post-Op Info:  Procedure(s) (LRB):  LAPAROSCOPY, DIAGNOSTIC (N/A)  EXCISION, SMALL INTESTINE (N/A)  APPLICATION, ACELLULAR HUMAN DERMAL ALLOGRAFT (N/A)  LAPAROTOMY, EXPLORATORY (N/A)  LAPAROTOMY, EXPLORATORY (N/A)  APPLICATION, WOUND VAC (N/A)  BLOCK, TRANSVERSUS ABDOMINIS PLANE (Bilateral)   3 Days Post-Op     Interval History: continues to pass flatus. Pain better controlled today. Tolerated CLD without nausea or emesis    Medications:  Continuous Infusions:   D5 and 0.9% NaCl   Intravenous Continuous 100 mL/hr at 08/29/24 0548 New Bag at 08/29/24 0548     Scheduled Meds:   acetaminophen  1,000 mg Oral Q8H    carvediloL  12.5 mg Oral BID    glycerin adult  1 suppository Rectal Once    ketorolac  15 mg Intravenous Q6H    linezolid  600 mg Intravenous Q12H    methocarbamoL  1,000 mg Oral TID    mupirocin   Nasal BID     piperacillin-tazobactam (Zosyn) IV (PEDS and ADULTS) (extended infusion is not appropriate)  4.5 g Intravenous Q8H    polyethylene glycol  17 g Oral Daily     PRN Meds:  Current Facility-Administered Medications:     0.9% NaCl, , Intravenous, PRN    butamben-TETRAcaine-benzocaine 2%-2%-14% (200 mg/sec), 1 spray, Topical (Top), Q4H PRN    dextrose 10%, 12.5 g, Intravenous, PRN    dextrose 10%, 25 g, Intravenous, PRN    HYDROmorphone, 0.2 mg, Intravenous, Q6H PRN    naloxone, 0.02 mg, Intravenous, PRN    ondansetron, 4 mg, Intravenous, Q6H PRN    oxyCODONE, 5 mg, Oral, Q4H PRN     Review of patient's allergies indicates:   Allergen Reactions    Sulfa (sulfonamide antibiotics) Other (See Comments)    Sulfamethoxazole-trimethoprim     Trimethoprim Other (See Comments)     Objective:     Vital Signs (Most Recent):  Temp: 98 °F (36.7 °C) (08/29/24 1443)  Pulse: 98 (08/29/24 1443)  Resp: 17 (08/29/24 1443)  BP: (!) 147/75 (08/29/24 1443)  SpO2: 97 % (08/29/24 1443) Vital Signs (24h Range):  Temp:  [97.5 °F (36.4 °C)-98.6 °F (37 °C)] 98 °F (36.7 °C)  Pulse:  [] 98  Resp:  [15-20] 17  SpO2:  [97 %-100 %] 97 %  BP: (137-171)/(69-80) 147/75     Weight: 106.4 kg (234 lb 9.1 oz)  Body mass index is 39.03 kg/m².    Intake/Output - Last 3 Shifts         08/27 0700 08/28 0659 08/28 0700 08/29 0659 08/29 0700 08/30 0659    I.V. (mL/kg) 10 (0.1) 6 (0.1)     IV Piggyback       Total Intake(mL/kg) 10 (0.1) 6 (0.1)     Urine (mL/kg/hr)       Drains 50      Total Output 50      Net -40 +6            Urine Occurrence 4 x 1 x 1 x             Physical Exam  Constitutional:       Appearance: She is well-developed.   HENT:      Head: Normocephalic and atraumatic.   Eyes:      Conjunctiva/sclera: Conjunctivae normal.      Pupils: Pupils are equal, round, and reactive to light.   Neck:      Thyroid: No thyromegaly.   Cardiovascular:      Rate and Rhythm: Normal rate and regular rhythm.   Pulmonary:      Effort: Pulmonary effort is  normal. No respiratory distress.   Abdominal:      General: There is distension (improved from prior).      Palpations: Abdomen is soft. There is no mass.      Tenderness: There is abdominal tenderness (appropriate, no rebound or guarding).      Comments: Wound vac in place with good seal   Musculoskeletal:         General: No tenderness. Normal range of motion.      Cervical back: Normal range of motion.   Skin:     General: Skin is warm and dry.      Capillary Refill: Capillary refill takes less than 2 seconds.   Neurological:      General: No focal deficit present.      Mental Status: She is alert and oriented to person, place, and time.          Significant Labs:  I have reviewed all pertinent lab results within the past 24 hours.  CBC:   Recent Labs   Lab 08/27/24  0546   WBC 12.40   RBC 4.30   HGB 11.4*   HCT 36.4*      MCV 85   MCH 26.5*   MCHC 31.3*     CMP:   Recent Labs   Lab 08/27/24  0546   *   CALCIUM 8.0*   ALBUMIN 1.7*   PROT 5.2*      K 3.9   CO2 25      BUN 13   CREATININE 0.9   ALKPHOS 88   ALT 24   AST 25   BILITOT 1.3*       Significant Diagnostics:  I have reviewed all pertinent imaging results/findings within the past 24 hours.  Assessment/Plan:     * S/P exploratory laparotomy; small bowel resection  POD #3- s/p diagnostic laparoscopy converted to laparotomy and small bowel resection for enterotomy       -- advance to regular diet  -- ok to stop mIVF  -- multimodal pain control  -- continue zosyn due to manish contamination 4d postoperatively  -- continue wound vac- wound care consult for assistance, home health  -- encourage ambulation and IS  -- lovenox ppx      Injury of small intestine  See exlap assessment and plan     Hypertension  -- management per primary team         Theresa Morrow MD  General Surgery  [facility]

## 2024-08-29 NOTE — ASSESSMENT & PLAN NOTE
POD #3- s/p diagnostic laparoscopy converted to laparotomy and small bowel resection for enterotomy       -- advance to regular diet  -- ok to stop mIVF  -- multimodal pain control  -- continue zosyn due to manish contamination 4d postoperatively  -- continue wound vac- wound care consult for assistance, home health  -- encourage ambulation and IS  -- lovenox ppx

## 2024-08-30 VITALS
OXYGEN SATURATION: 97 % | SYSTOLIC BLOOD PRESSURE: 165 MMHG | HEART RATE: 92 BPM | DIASTOLIC BLOOD PRESSURE: 79 MMHG | HEIGHT: 65 IN | WEIGHT: 234.56 LBS | TEMPERATURE: 99 F | BODY MASS INDEX: 39.08 KG/M2 | RESPIRATION RATE: 16 BRPM

## 2024-08-30 PROBLEM — S36.409A INJURY OF SMALL INTESTINE: Status: RESOLVED | Noted: 2024-08-25 | Resolved: 2024-08-30

## 2024-08-30 LAB — BACTERIA UR CULT: NO GROWTH

## 2024-08-30 PROCEDURE — 63600175 PHARM REV CODE 636 W HCPCS: Performed by: OBSTETRICS & GYNECOLOGY

## 2024-08-30 PROCEDURE — 63600175 PHARM REV CODE 636 W HCPCS: Performed by: STUDENT IN AN ORGANIZED HEALTH CARE EDUCATION/TRAINING PROGRAM

## 2024-08-30 PROCEDURE — 99900035 HC TECH TIME PER 15 MIN (STAT)

## 2024-08-30 PROCEDURE — 25000003 PHARM REV CODE 250: Performed by: OBSTETRICS & GYNECOLOGY

## 2024-08-30 PROCEDURE — 25000003 PHARM REV CODE 250: Performed by: STUDENT IN AN ORGANIZED HEALTH CARE EDUCATION/TRAINING PROGRAM

## 2024-08-30 PROCEDURE — 25000003 PHARM REV CODE 250: Performed by: PHYSICIAN ASSISTANT

## 2024-08-30 PROCEDURE — 97161 PT EVAL LOW COMPLEX 20 MIN: CPT

## 2024-08-30 RX ORDER — LINEZOLID 600 MG/1
600 TABLET, FILM COATED ORAL DAILY
Qty: 5 TABLET | Refills: 0 | Status: SHIPPED | OUTPATIENT
Start: 2024-08-30 | End: 2024-09-04

## 2024-08-30 RX ORDER — ACETAMINOPHEN 500 MG
1000 TABLET ORAL EVERY 8 HOURS
Qty: 30 TABLET | Refills: 0 | Status: SHIPPED | OUTPATIENT
Start: 2024-08-30

## 2024-08-30 RX ORDER — METHOCARBAMOL 500 MG/1
1000 TABLET, FILM COATED ORAL 3 TIMES DAILY
Qty: 60 TABLET | Refills: 0 | Status: SHIPPED | OUTPATIENT
Start: 2024-08-30 | End: 2024-09-09

## 2024-08-30 RX ORDER — OXYCODONE HYDROCHLORIDE 5 MG/1
5 TABLET ORAL EVERY 4 HOURS PRN
Qty: 30 TABLET | Refills: 0 | Status: SHIPPED | OUTPATIENT
Start: 2024-08-30

## 2024-08-30 RX ADMIN — ACETAMINOPHEN 1000 MG: 500 TABLET ORAL at 06:08

## 2024-08-30 RX ADMIN — OXYCODONE HYDROCHLORIDE 5 MG: 5 TABLET ORAL at 08:08

## 2024-08-30 RX ADMIN — MUPIROCIN: 20 OINTMENT TOPICAL at 08:08

## 2024-08-30 RX ADMIN — METHOCARBAMOL 1000 MG: 500 TABLET ORAL at 08:08

## 2024-08-30 RX ADMIN — POLYETHYLENE GLYCOL 3350 17 G: 17 POWDER, FOR SOLUTION ORAL at 08:08

## 2024-08-30 RX ADMIN — KETOROLAC TROMETHAMINE 15 MG: 30 INJECTION, SOLUTION INTRAMUSCULAR at 02:08

## 2024-08-30 RX ADMIN — DEXTROSE AND SODIUM CHLORIDE: 5; 900 INJECTION, SOLUTION INTRAVENOUS at 02:08

## 2024-08-30 RX ADMIN — KETOROLAC TROMETHAMINE 15 MG: 30 INJECTION, SOLUTION INTRAMUSCULAR at 08:08

## 2024-08-30 RX ADMIN — PIPERACILLIN SODIUM AND TAZOBACTAM SODIUM 4.5 G: 4; .5 INJECTION, POWDER, FOR SOLUTION INTRAVENOUS at 05:08

## 2024-08-30 RX ADMIN — CARVEDILOL 12.5 MG: 12.5 TABLET, FILM COATED ORAL at 08:08

## 2024-08-30 NOTE — PROGRESS NOTES
[facility]  Obstetrics & Gynecology  Progress Note    Patient Name: Estefany Berumen  MRN: [id number]  Admission Date: 8/25/2024  Primary Care Provider: Lm Rdz MD  Principal Problem: S/P exploratory laparotomy    Subjective:     HPI:  49 y/o s/p ralhbso for uterine prolapse presents to ER for worsening abdominal pain.  Patient reports actually passing out due to pain.  Feels she has been unable to pass gas.  Patient reports poor appetite due to pain.  Also feels her belly has become more distended.    Interval History: POD 4 s/p take back for small bowel resection and wound vac placement. Reports improved pain control today. Had ROBF. Voiding spontaneously. Ambulating. Tolerating diet. Eager for discharge.     Scheduled Meds:   acetaminophen  1,000 mg Oral Q8H    carvediloL  12.5 mg Oral BID    enoxparin  40 mg Subcutaneous Q24H (prophylaxis, 1700)    ketorolac  15 mg Intravenous Q6H    linezolid  600 mg Intravenous Q12H    methocarbamoL  1,000 mg Oral TID    mupirocin   Nasal BID    piperacillin-tazobactam (Zosyn) IV (PEDS and ADULTS) (extended infusion is not appropriate)  4.5 g Intravenous Q8H    polyethylene glycol  17 g Oral Daily     Continuous Infusions:   D5 and 0.9% NaCl   Intravenous Continuous 100 mL/hr at 08/30/24 0229 New Bag at 08/30/24 0229     PRN Meds:  Current Facility-Administered Medications:     0.9% NaCl, , Intravenous, PRN    butamben-TETRAcaine-benzocaine 2%-2%-14% (200 mg/sec), 1 spray, Topical (Top), Q4H PRN    dextrose 10%, 12.5 g, Intravenous, PRN    dextrose 10%, 25 g, Intravenous, PRN    HYDROmorphone, 0.2 mg, Intravenous, Q6H PRN    naloxone, 0.02 mg, Intravenous, PRN    ondansetron, 4 mg, Intravenous, Q6H PRN    oxyCODONE, 5 mg, Oral, Q4H PRN    Review of patient's allergies indicates:   Allergen Reactions    Sulfa (sulfonamide antibiotics) Other (See Comments)    Sulfamethoxazole-trimethoprim     Trimethoprim Other (See Comments)       Objective:     Vital Signs  (Most Recent):  Temp: 98.8 °F (37.1 °C) (08/30/24 0802)  Pulse: 92 (08/30/24 0802)  Resp: 16 (08/30/24 0802)  BP: (!) 165/79 (08/30/24 0802)  SpO2: 97 % (08/30/24 0802) Vital Signs (24h Range):  Temp:  [97.5 °F (36.4 °C)-98.8 °F (37.1 °C)] 98.8 °F (37.1 °C)  Pulse:  [] 92  Resp:  [16-18] 16  SpO2:  [93 %-99 %] 97 %  BP: (137-186)/(70-95) 165/79     Weight: 106.4 kg (234 lb 9.1 oz)  Body mass index is 39.03 kg/m².  Patient's last menstrual period was 08/05/2024.    I&O (Last 24H):  No intake or output data in the 24 hours ending 08/30/24 0829      Laboratory:  Recent Lab Results         08/29/24  1824   08/29/24  1704   08/29/24  1535   08/29/24  1224        QRS Duration     84         [facility] QTC Calculation     444         POCT Glucose 114   82     105             I have personallly reviewed all pertinent lab results from the last 24 hours.      Physical Exam:   Constitutional: She is oriented to person, place, and time. She appears well-developed and well-nourished.    HENT:   Head: Normocephalic and atraumatic.    Eyes: Right eye exhibits no discharge. Left eye exhibits no discharge. No scleral icterus.      Pulmonary/Chest: Effort normal. No respiratory distress.        Abdominal: She exhibits distension and abdominal incision. There is abdominal tenderness.   Wound vac in place on abdomen, good seal appreciated. Serosanguinous output appreciated in cannister.              Musculoskeletal: Moves all extremeties.       Neurological: She is alert and oriented to person, place, and time.    Skin: Skin is warm and dry. She is not diaphoretic.             Assessment/Plan:     * S/P exploratory laparotomy; small bowel resection  Underwent takeback with washout and small bowel resection on 8/26/24.  - POD 4  - Tolerating diet  - Continue multimodal pain control  - Zosyn x 4 days post-op while inpatient  - Wound vac in place, wound care following  - Voiding spontaneously  - PT to see and treat  - Encouraged OOBTC,  "ambulation, and IS use  - Anticipate discharge today if home wound vac cannister is delivered        Acute cystitis with hematuria  - Linezolid x 7 days  - Cultures pending    Injury of small intestine  - See "s/p exploratory laparotomy; small bowel resection" for plan    Status post laparoscopic hysterectomy  Underwent RALH/BSO on 8/22. Now s/p takeback for small bowel resection and washout.  - POD 8 s/p RALH  - Denies vaginal bleeding  - Multimodal pain control  - Voiding spontaneously  - PT to see and treat  - Encouraged OOBTC, ambulation, and IS use  - Anticipate d/c today      Hypertension  - Continue home Carvedilol         ISAEL HintonC  Obstetrics & Gynecology  [facility]    "

## 2024-08-30 NOTE — NURSING
Pt being discharged with family member at bedside. Exchanged wound vac to home wound vac. D/c pt iv. Educated pt on discharge instructions stated she understood. Pt left via wheelchair .

## 2024-08-30 NOTE — DISCHARGE SUMMARY
[facility]  Obstetrics & Gynecology  Discharge Summary    Patient Name: Estefany Berumen  MRN: [id number]  Admission Date: 8/25/2024  Hospital Length of Stay: 5 days  Discharge Date and Time:  08/30/2024 8:39 AM  Attending Physician: Tigist Mejia MD   Discharging Provider: Rahel Mckenzie PA-C  Primary Care Provider: Lm Rdz MD    HPI:  47 y/o s/p ralhbso for uterine prolapse presents to ER for worsening abdominal pain.  Patient reports actually passing out due to pain.  Feels she has been unable to pass gas.  Patient reports poor appetite due to pain.  Also feels her belly has become more distended.    Hospital Course:  08/25/2024--CT scan--Moderate generalized free air and free fluid. Hollow viscus perforation should be considered. This could also be sequela of surgery but this is slightly less favored.   Mildly dilated left-sided loops of small bowel which could reflect ileus or obstruction.  NG tube placed, general surgery consulted  8/26/24 - Patient reports feeling some improvement, but still has lower abdominal pain.  Still with mild nausea.  No vomiting.;clinically worse, taken for  dx lapscope; exp lap with small bowel resection  8/27/24 --pd #1 s/p exp lap small bowel resection, NG tube in place  8/28/24: POD 2. Wound vac and NGT in place. No emesis this AM. Ambulating.   8/29/24: POD 3 s/p ex lap. Wound vac in place. Tolerating CLD. Pain not well managed. UA concerning for UTI.   8/30/24: POD 4. Pain much improved today, had ROBF. Eager to go home. Discharge pending home wound vac delivery.     Goals of Care Treatment Preferences:  Code Status: Full Code      Procedure(s) (LRB):  LAPAROSCOPY, DIAGNOSTIC (N/A)  EXCISION, SMALL INTESTINE (N/A)  APPLICATION, ACELLULAR HUMAN DERMAL ALLOGRAFT (N/A)  LAPAROTOMY, EXPLORATORY (N/A)  LAPAROTOMY, EXPLORATORY (N/A)  APPLICATION, WOUND VAC (N/A)  BLOCK, TRANSVERSUS ABDOMINIS PLANE (Bilateral)     Consults (From admission, onward)           Status Ordering Provider     IP consult to case management  Once        Provider:  (Not yet assigned)    Completed KINJAL RECINOS     Inpatient consult to General Surgery  Once        Provider:  Paulina Bah MD    Completed PORSHA SCHUMACHER            Significant Diagnostic Studies: Labs: All labs within the past 24 hours have been reviewed      Pending Diagnostic Studies:       None          Final Active Diagnoses:    Diagnosis Date Noted POA    PRINCIPAL PROBLEM:  S/P exploratory laparotomy; small bowel resection [Z98.890] 08/27/2024 Not Applicable    Acute cystitis with hematuria [N30.01] 08/29/2024 No    Status post laparoscopic hysterectomy [Z90.710] 08/22/2024 Yes    Hypertension [I10] 01/05/2024 Yes      Problems Resolved During this Admission:    Diagnosis Date Noted Date Resolved POA    Injury of small intestine [S36.409A] 08/25/2024 08/30/2024 Yes        Discharged Condition: good    Disposition: Home-Health Care [facility]    Follow Up:    Patient Instructions:      Diet Adult Regular     Other restrictions (specify):   Order Comments: No heavy lifting (over 10lbs) and no strenuous activity for at least 6 weeks. No soaking in water of any kind (tubs, hot tubs, pools, etc) until approved by MD at follow-up.     No driving until:   Order Comments: No driving while on pain medications.     Notify your health care provider if you experience any of the following:  temperature >100.4     Notify your health care provider if you experience any of the following:  persistent nausea and vomiting or diarrhea     Notify your health care provider if you experience any of the following:  severe uncontrolled pain     Notify your health care provider if you experience any of the following:  redness, tenderness, or signs of infection (pain, swelling, redness, odor or green/yellow discharge around incision site)     Notify your health care provider if you experience any of the following:  difficulty breathing or increased  cough     Notify your health care provider if you experience any of the following:  severe persistent headache     Notify your health care provider if you experience any of the following:  worsening rash     Notify your health care provider if you experience any of the following:  persistent dizziness, light-headedness, or visual disturbances     Notify your health care provider if you experience any of the following:  increased confusion or weakness     Pelvic Rest   Order Comments: 12 weeks     Change dressing (specify)   Order Comments: Dressing change: 2 times per week with Home Health.     Medications:  Reconciled Home Medications:      Medication List        START taking these medications      acetaminophen 500 MG tablet  Commonly known as: TYLENOL  Take 2 tablets (1,000 mg total) by mouth every 8 (eight) hours.     linezolid 600 mg Tab  Commonly known as: ZYVOX  Take 1 tablet (600 mg total) by mouth once daily. for 5 days     methocarbamoL 1,000 mg Tab  Take 1,000 mg by mouth 3 (three) times daily. for 10 days     oxyCODONE 5 MG immediate release tablet  Commonly known as: ROXICODONE  Take 1 tablet (5 mg total) by mouth every 4 (four) hours as needed for Pain.            CONTINUE taking these medications      carvediloL 12.5 MG tablet  Commonly known as: COREG  Take 12.5 mg by mouth 2 (two) times daily.     olmesartan-hydrochlorothiazide 40-12.5 mg Tab  Commonly known as: BENICAR HCT  Take 1 tablet by mouth once daily.            STOP taking these medications      HYDROcodone-acetaminophen 5-325 mg per tablet  Commonly known as: JEWEL Mckenzie PA-C  Obstetrics & Gynecology  [facility]

## 2024-08-30 NOTE — ASSESSMENT & PLAN NOTE
Underwent RALH/BSO on 8/22. Now s/p takeback for small bowel resection and washout.  - POD 8 s/p RALH  - Denies vaginal bleeding  - Multimodal pain control  - Voiding spontaneously  - PT to see and treat  - Encouraged OOBTC, ambulation, and IS use  - Anticipate d/c today

## 2024-08-30 NOTE — PROGRESS NOTES
F/U visit with Ms. Berumen for wound vac dressing change prior to discharge. Patient was premedicated for pain by primary nurse.     Wound vac paused, clamped, disconnected. Dressing gently removed.   Surgical incision to midline abdomen - measures 15x4.5x3cm. wound bed moist red and yellow adipose tissue, small amount serosanguinous drainage noted.  Wound bed moist red and yellow subcutaneous tissue, budding granulation tissue.    Wound cleansed with vashe and patted dry. Edel wound skin painted with cavilon. Wound filled with 1 piece black foam and sealed with drape. Sensatrac pad and tubing applied and attached to [facility] wound vac ULTA at continuous -125 mmHg suction with good seal.   Patient tolerated care well. Discharge home wound vac education initiated including troubleshooting alarms.

## 2024-08-30 NOTE — SUBJECTIVE & OBJECTIVE
Interval History: had BM and tolerating diet. Pain well controlled    Medications:  Continuous Infusions:   D5 and 0.9% NaCl   Intravenous Continuous 100 mL/hr at 08/30/24 0229 New Bag at 08/30/24 0229     Scheduled Meds:   acetaminophen  1,000 mg Oral Q8H    carvediloL  12.5 mg Oral BID    enoxparin  40 mg Subcutaneous Q24H (prophylaxis, 1700)    ketorolac  15 mg Intravenous Q6H    linezolid  600 mg Intravenous Q12H    methocarbamoL  1,000 mg Oral TID    mupirocin   Nasal BID    piperacillin-tazobactam (Zosyn) IV (PEDS and ADULTS) (extended infusion is not appropriate)  4.5 g Intravenous Q8H    polyethylene glycol  17 g Oral Daily     PRN Meds:  Current Facility-Administered Medications:     0.9% NaCl, , Intravenous, PRN    butamben-TETRAcaine-benzocaine 2%-2%-14% (200 mg/sec), 1 spray, Topical (Top), Q4H PRN    dextrose 10%, 12.5 g, Intravenous, PRN    dextrose 10%, 25 g, Intravenous, PRN    HYDROmorphone, 0.2 mg, Intravenous, Q6H PRN    naloxone, 0.02 mg, Intravenous, PRN    ondansetron, 4 mg, Intravenous, Q6H PRN    oxyCODONE, 5 mg, Oral, Q4H PRN     Review of patient's allergies indicates:   Allergen Reactions    Sulfa (sulfonamide antibiotics) Other (See Comments)    Sulfamethoxazole-trimethoprim     Trimethoprim Other (See Comments)     Objective:     Vital Signs (Most Recent):  Temp: 98.8 °F (37.1 °C) (08/30/24 0802)  Pulse: 92 (08/30/24 0836)  Resp: 16 (08/30/24 0836)  BP: (!) 165/79 (08/30/24 0836)  SpO2: 97 % (08/30/24 0802) Vital Signs (24h Range):  Temp:  [97.5 °F (36.4 °C)-98.8 °F (37.1 °C)] 98.8 °F (37.1 °C)  Pulse:  [] 92  Resp:  [16-18] 16  SpO2:  [93 %-99 %] 97 %  BP: (137-186)/(70-95) 165/79     Weight: 106.4 kg (234 lb 9.1 oz)  Body mass index is 39.03 kg/m².    Intake/Output - Last 3 Shifts         08/28 0700 08/29 0659 08/29 0700 08/30 0659 08/30 0700 08/31 0659    I.V. (mL/kg) 6 (0.1)      Total Intake(mL/kg) 6 (0.1)      Drains       Total Output       Net +6             Urine  Occurrence 1 x 4 x     Stool Occurrence  1 x              Physical Exam  Constitutional:       Appearance: She is well-developed.   HENT:      Head: Normocephalic and atraumatic.   Eyes:      Conjunctiva/sclera: Conjunctivae normal.      Pupils: Pupils are equal, round, and reactive to light.   Neck:      Thyroid: No thyromegaly.   Cardiovascular:      Rate and Rhythm: Normal rate and regular rhythm.   Pulmonary:      Effort: Pulmonary effort is normal. No respiratory distress.   Abdominal:      General: There is no distension.      Palpations: Abdomen is soft. There is no mass.      Tenderness: There is no abdominal tenderness.      Comments: Sound vac with good seal   Musculoskeletal:         General: No tenderness. Normal range of motion.      Cervical back: Normal range of motion.   Skin:     General: Skin is warm and dry.      Capillary Refill: Capillary refill takes less than 2 seconds.   Neurological:      General: No focal deficit present.      Mental Status: She is alert and oriented to person, place, and time.          Significant Labs:  I have reviewed all pertinent lab results within the past 24 hours.  CBC:   Recent Labs   Lab 08/27/24  0546   WBC 12.40   RBC 4.30   HGB 11.4*   HCT 36.4*      MCV 85   MCH 26.5*   MCHC 31.3*     CMP:   Recent Labs   Lab 08/27/24  0546   *   CALCIUM 8.0*   ALBUMIN 1.7*   PROT 5.2*      K 3.9   CO2 25      BUN 13   CREATININE 0.9   ALKPHOS 88   ALT 24   AST 25   BILITOT 1.3*       Significant Diagnostics:  I have reviewed all pertinent imaging results/findings within the past 24 hours.

## 2024-08-30 NOTE — PROGRESS NOTES
[facility] Surg  General Surgery  Progress Note    Subjective:     History of Present Illness:  Estefany Berumen is a 48 y.o. female w/ history Robotic KRISTY/BSO on 8/22/2024 w/ Dr. Bari Dwyer on whom we were consulted for SBO vs ileus.  Pt presented to the ED last night c/o abdominal pain and distention and reported syncopal episode 2/2 pain.  She states that she has had consistent pain since surgery and has not had any flatus or bowel function since the surgery.  In the ED she was afebrile, tachy to 120s, and borderline hypotensive with BP in the 90s systolic.  Labs revealed a leukocytosis of 14K but were otherwise normal.  CT scan was done which showed moderate generalized free air and free fluid and mildly dilated left-sided loops of small bowel which could reflect ileus or obstruction.  She was admitted to gyn and we were consulted for assistance w/ SBO management.  NGT was placed in the ER and has had minimal output since then as far as I can tell.  Today she states she feels a little better and that her abdomen feels less distended.  She is hungry and wants to know when she can eat.        Post-Op Info:  Procedure(s) (LRB):  LAPAROSCOPY, DIAGNOSTIC (N/A)  EXCISION, SMALL INTESTINE (N/A)  APPLICATION, ACELLULAR HUMAN DERMAL ALLOGRAFT (N/A)  LAPAROTOMY, EXPLORATORY (N/A)  LAPAROTOMY, EXPLORATORY (N/A)  APPLICATION, WOUND VAC (N/A)  BLOCK, TRANSVERSUS ABDOMINIS PLANE (Bilateral)   4 Days Post-Op     Interval History: had BM and tolerating diet. Pain well controlled    Medications:  Continuous Infusions:   D5 and 0.9% NaCl   Intravenous Continuous 100 mL/hr at 08/30/24 0229 New Bag at 08/30/24 0229     Scheduled Meds:   acetaminophen  1,000 mg Oral Q8H    carvediloL  12.5 mg Oral BID    enoxparin  40 mg Subcutaneous Q24H (prophylaxis, 1700)    ketorolac  15 mg Intravenous Q6H    linezolid  600 mg Intravenous Q12H    methocarbamoL  1,000 mg Oral TID    mupirocin   Nasal BID    piperacillin-tazobactam (Zosyn)  IV (PEDS and ADULTS) (extended infusion is not appropriate)  4.5 g Intravenous Q8H    polyethylene glycol  17 g Oral Daily     PRN Meds:  Current Facility-Administered Medications:     0.9% NaCl, , Intravenous, PRN    butamben-TETRAcaine-benzocaine 2%-2%-14% (200 mg/sec), 1 spray, Topical (Top), Q4H PRN    dextrose 10%, 12.5 g, Intravenous, PRN    dextrose 10%, 25 g, Intravenous, PRN    HYDROmorphone, 0.2 mg, Intravenous, Q6H PRN    naloxone, 0.02 mg, Intravenous, PRN    ondansetron, 4 mg, Intravenous, Q6H PRN    oxyCODONE, 5 mg, Oral, Q4H PRN     Review of patient's allergies indicates:   Allergen Reactions    Sulfa (sulfonamide antibiotics) Other (See Comments)    Sulfamethoxazole-trimethoprim     Trimethoprim Other (See Comments)     Objective:     Vital Signs (Most Recent):  Temp: 98.8 °F (37.1 °C) (08/30/24 0802)  Pulse: 92 (08/30/24 0836)  Resp: 16 (08/30/24 0836)  BP: (!) 165/79 (08/30/24 0836)  SpO2: 97 % (08/30/24 0802) Vital Signs (24h Range):  Temp:  [97.5 °F (36.4 °C)-98.8 °F (37.1 °C)] 98.8 °F (37.1 °C)  Pulse:  [] 92  Resp:  [16-18] 16  SpO2:  [93 %-99 %] 97 %  BP: (137-186)/(70-95) 165/79     Weight: 106.4 kg (234 lb 9.1 oz)  Body mass index is 39.03 kg/m².    Intake/Output - Last 3 Shifts         08/28 0700 08/29 0659 08/29 0700 08/30 0659 08/30 0700 08/31 0659    I.V. (mL/kg) 6 (0.1)      Total Intake(mL/kg) 6 (0.1)      Drains       Total Output       Net +6             Urine Occurrence 1 x 4 x     Stool Occurrence  1 x              Physical Exam  Constitutional:       Appearance: She is well-developed.   HENT:      Head: Normocephalic and atraumatic.   Eyes:      Conjunctiva/sclera: Conjunctivae normal.      Pupils: Pupils are equal, round, and reactive to light.   Neck:      Thyroid: No thyromegaly.   Cardiovascular:      Rate and Rhythm: Normal rate and regular rhythm.   Pulmonary:      Effort: Pulmonary effort is normal. No respiratory distress.   Abdominal:      General: There is no  distension.      Palpations: Abdomen is soft. There is no mass.      Tenderness: There is no abdominal tenderness.      Comments: Sound vac with good seal   Musculoskeletal:         General: No tenderness. Normal range of motion.      Cervical back: Normal range of motion.   Skin:     General: Skin is warm and dry.      Capillary Refill: Capillary refill takes less than 2 seconds.   Neurological:      General: No focal deficit present.      Mental Status: She is alert and oriented to person, place, and time.          Significant Labs:  I have reviewed all pertinent lab results within the past 24 hours.  CBC:   Recent Labs   Lab 08/27/24  0546   WBC 12.40   RBC 4.30   HGB 11.4*   HCT 36.4*      MCV 85   MCH 26.5*   MCHC 31.3*     CMP:   Recent Labs   Lab 08/27/24  0546   *   CALCIUM 8.0*   ALBUMIN 1.7*   PROT 5.2*      K 3.9   CO2 25      BUN 13   CREATININE 0.9   ALKPHOS 88   ALT 24   AST 25   BILITOT 1.3*       Significant Diagnostics:  I have reviewed all pertinent imaging results/findings within the past 24 hours.  Assessment/Plan:     * S/P exploratory laparotomy; small bowel resection  POD #4- s/p diagnostic laparoscopy converted to laparotomy and small bowel resection for enterotomy       --  regular diet  -- ok to stop mIVF  -- multimodal pain control  -- continue zosyn due to manish contamination 4d postoperatively, last day today. Remainder of abx duration per gyn  -- continue wound vac- wound care consult for assistance, home health  -- encourage ambulation and IS  -- lovenox ppx    Ok to discharge. Will followup in 2 weeks for wound check      Hypertension  -- management per primary team         Theresa Morrow MD  General Surgery  [facility] - Med Surg

## 2024-08-30 NOTE — PLAN OF CARE
Free from falls. Ambulates to bathroom. IV abx maintained. PRN pain meds managed care. No s/s of acute distress. 12hr chart check complete.

## 2024-08-30 NOTE — PLAN OF CARE
[facility] - Med Surg  Discharge Final Note    Primary Care Provider: Lm Rdz MD    Expected Discharge Date: 8/30/2024    Final Discharge Note (most recent)       Final Note - 08/30/24 1046          Final Note    Assessment Type Final Discharge Note     Anticipated Discharge Disposition Home-Health Care [facility]     Hospital Resources/Appts/Education Provided Post-Acute resouces added to AVS        Post-Acute Status    Post-Acute Authorization Home Health;HME     HME Status Set-up Complete/Auth obtained     Home Health Status Set-up Complete/Auth obtained     Discharge Delays None known at this time                     Contact Info       [facility]    [address] [place] [address]       Next Steps: Call    Instructions: As needed    Theresa Morrow MD   Specialty: Colon and Rectal Surgery    [address] [place] [address] [address] [id number]   Phone: [phone]       Next Steps: Follow up in 2 week(s)          [employer] vac approved and delivered to bedside. POD form signed and emailed to [facility] with [facility].     [facility] arranged per pt's request.     Patient has no d/c needs at this time. Sw to follow up, as needed, for d/c planning purposes.

## 2024-08-30 NOTE — SUBJECTIVE & OBJECTIVE
Interval History: POD 4 s/p take back for small bowel resection and wound vac placement. Reports improved pain control today. Had ROBF. Voiding spontaneously. Ambulating. Tolerating diet. Eager for discharge.     Scheduled Meds:   acetaminophen  1,000 mg Oral Q8H    carvediloL  12.5 mg Oral BID    enoxparin  40 mg Subcutaneous Q24H (prophylaxis, 1700)    ketorolac  15 mg Intravenous Q6H    linezolid  600 mg Intravenous Q12H    methocarbamoL  1,000 mg Oral TID    mupirocin   Nasal BID    piperacillin-tazobactam (Zosyn) IV (PEDS and ADULTS) (extended infusion is not appropriate)  4.5 g Intravenous Q8H    polyethylene glycol  17 g Oral Daily     Continuous Infusions:   D5 and 0.9% NaCl   Intravenous Continuous 100 mL/hr at 08/30/24 0229 New Bag at 08/30/24 0229     PRN Meds:  Current Facility-Administered Medications:     0.9% NaCl, , Intravenous, PRN    butamben-TETRAcaine-benzocaine 2%-2%-14% (200 mg/sec), 1 spray, Topical (Top), Q4H PRN    dextrose 10%, 12.5 g, Intravenous, PRN    dextrose 10%, 25 g, Intravenous, PRN    HYDROmorphone, 0.2 mg, Intravenous, Q6H PRN    naloxone, 0.02 mg, Intravenous, PRN    ondansetron, 4 mg, Intravenous, Q6H PRN    oxyCODONE, 5 mg, Oral, Q4H PRN    Review of patient's allergies indicates:   Allergen Reactions    Sulfa (sulfonamide antibiotics) Other (See Comments)    Sulfamethoxazole-trimethoprim     Trimethoprim Other (See Comments)       Objective:     Vital Signs (Most Recent):  Temp: 98.8 °F (37.1 °C) (08/30/24 0802)  Pulse: 92 (08/30/24 0802)  Resp: 16 (08/30/24 0802)  BP: (!) 165/79 (08/30/24 0802)  SpO2: 97 % (08/30/24 0802) Vital Signs (24h Range):  Temp:  [97.5 °F (36.4 °C)-98.8 °F (37.1 °C)] 98.8 °F (37.1 °C)  Pulse:  [] 92  Resp:  [16-18] 16  SpO2:  [93 %-99 %] 97 %  BP: (137-186)/(70-95) 165/79     Weight: 106.4 kg (234 lb 9.1 oz)  Body mass index is 39.03 kg/m².  Patient's last menstrual period was 08/05/2024.    I&O (Last 24H):  No intake or output data in the 24  hours ending 08/30/24 0829      Laboratory:  Recent Lab Results         08/29/24  1824   08/29/24  1704   08/29/24  1535   08/29/24  1224        QRS Duration     84         [facility] QTC Calculation     444         POCT Glucose 114   82     105             I have personallly reviewed all pertinent lab results from the last 24 hours.      Physical Exam:   Constitutional: She is oriented to person, place, and time. She appears well-developed and well-nourished.    HENT:   Head: Normocephalic and atraumatic.    Eyes: Right eye exhibits no discharge. Left eye exhibits no discharge. No scleral icterus.      Pulmonary/Chest: Effort normal. No respiratory distress.        Abdominal: She exhibits distension and abdominal incision. There is abdominal tenderness.   Wound vac in place on abdomen, good seal appreciated. Serosanguinous output appreciated in cannister.              Musculoskeletal: Moves all extremeties.       Neurological: She is alert and oriented to person, place, and time.    Skin: Skin is warm and dry. She is not diaphoretic.

## 2024-08-30 NOTE — ASSESSMENT & PLAN NOTE
Underwent takeback with washout and small bowel resection on 8/26/24.  - POD 4  - Tolerating diet  - Continue multimodal pain control  - Zosyn x 4 days post-op while inpatient  - Wound vac in place, wound care following  - Voiding spontaneously  - PT to see and treat  - Encouraged OOBTC, ambulation, and IS use  - Anticipate discharge today if home wound vac cannister is delivered

## 2024-08-30 NOTE — ASSESSMENT & PLAN NOTE
POD #4- s/p diagnostic laparoscopy converted to laparotomy and small bowel resection for enterotomy       --  regular diet  -- ok to stop mIVF  -- multimodal pain control  -- continue zosyn due to manish contamination 4d postoperatively, last day today. Remainder of abx duration per gyn  -- continue wound vac- wound care consult for assistance, home health  -- encourage ambulation and IS  -- lovenox ppx    Ok to discharge. Will followup in 2 weeks for wound check

## 2024-08-30 NOTE — PLAN OF CARE
[facility] - Med S[facility]      HOME HEALTH ORDERS  FACE TO FACE ENCOUNTER    Patient Name: Estefany Berumen  YOB: 1975    PCP: Lm Rdz MD   PCP Address: [address] [facility] [place] [facility]  PCP Phone Number: [phone]  PCP Fax: [phone]    Encounter Date: 8/25/24    Admit to Home Health    Diagnoses:  Active Hospital Problems    Diagnosis  POA    *S/P exploratory laparotomy; small bowel resection [Z98.890]  Not Applicable    Acute cystitis with hematuria [N30.01]  No     - Add Linezolid to abx regimen  - Cultures pending      Injury of small intestine [S36.409A]  Yes    Status post laparoscopic hysterectomy [Z90.710]  Yes     08/22/2024 Robotic Hysterectomy bilateral salpingo-oophorectomy with uterosacral ligament support       Hypertension [I10]  Yes      Resolved Hospital Problems   No resolved problems to display.       Follow Up Appointments:  Future Appointments   Date Time Provider Department Center   9/24/2024 10:30 AM CAT Dwyer MD ON [facility]       Allergies:  Review of patient's allergies indicates:   Allergen Reactions    Sulfa (sulfonamide antibiotics) Other (See Comments)    Sulfamethoxazole-trimethoprim     Trimethoprim Other (See Comments)       Medications: Review discharge medications with patient and family and provide education.    Current Facility-Administered Medications   Medication Dose Route Frequency Provider Last Rate Last Admin    0.9%  NaCl infusion   Intravenous PRN Tigist Mejia MD 5 mL/hr at 08/27/24 2050 New Bag at 08/27/24 2050    acetaminophen tablet 1,000 mg  1,000 mg Oral Q8H Theresa Morrow MD   1,000 mg at 08/30/24 0628    butamben-TETRAcaine-benzocaine 2%-2%-14% (200 mg/sec) spray 1 spray  1 spray Topical (Top) Q4H PRN Julia Lemus MD   1 spray at 08/27/24 0529    carvediloL tablet 12.5 mg  12.5 mg Oral BID Rahel Mckenzie, PA-C   12.5 mg at 08/29/24 2012    dextrose 10% bolus 125  mL 125 mL  12.5 g Intravenous PRN Julia Lemus MD        dextrose 10% bolus 250 mL 250 mL  25 g Intravenous PRN Julia Lemus MD        dextrose 5 % and 0.9 % NaCl infusion   Intravenous Continuous Julia Lemus  mL/hr at 08/30/24 0229 New Bag at 08/30/24 0229    enoxaparin injection 40 mg  40 mg Subcutaneous Q24H (prophylaxis, 1700) Theresa Morrow MD   40 mg at 08/29/24 1708    HYDROmorphone (PF) injection 0.2 mg  0.2 mg Intravenous Q6H PRN Theresa Morrow MD        ketorolac injection 15 mg  15 mg Intravenous Q6H Theresa Morrow MD   15 mg at 08/30/24 0228    linezolid 600 mg/300 mL IVPB 600 mg  600 mg Intravenous Q12H Aye Dwyer MD   Stopped at 08/29/24 2122    methocarbamoL tablet 1,000 mg  1,000 mg Oral TID Theresa Morrow MD   1,000 mg at 08/29/24 2013    mupirocin 2 % ointment   Nasal BID Tigist Mejia MD   Given at 08/29/24 2013    naloxone 0.4 mg/mL injection 0.02 mg  0.02 mg Intravenous PRN Julia Lemus MD        ondansetron injection 4 mg  4 mg Intravenous Q6H PRN Julia Lemus MD   4 mg at 08/27/24 1611    oxyCODONE immediate release tablet 5 mg  5 mg Oral Q4H PRN Theresa Morrow MD   5 mg at 08/29/24 2012    piperacillin-tazobactam (ZOSYN) 4.5 g in D5W 100 mL IVPB (MB+)  4.5 g Intravenous Q8H Julia Lemus MD 25 mL/hr at 08/30/24 0531 4.5 g at 08/30/24 0531    polyethylene glycol packet 17 g  17 g Oral Daily Theresa Morrow MD   17 g at 08/29/24 1412        Medication List        ASK your doctor about these medications      carvediloL 12.5 MG tablet  Commonly known as: COREG  Take 12.5 mg by mouth 2 (two) times daily.     HYDROcodone-acetaminophen 5-325 mg per tablet  Commonly known as: NORCO  Take 1 tablet by mouth every 6 (six) hours as needed for Pain.  Ask about: Should I take this medication?     olmesartan-hydrochlorothiazide 40-12.5 mg Tab  Commonly known as: BENICAR HCT  Take 1 tablet by  mouth once daily.                I have seen and examined this patient within the last 30 days. My clinical findings that support the need for the home health skilled services and home bound status are the following:no   Weakness/numbness causing balance and gait disturbance due to Surgery making it taxing to leave home.  Requiring assistive device to leave home due to unsteady gait caused by  Surgery.  Medical restrictions requiring assistance of another human to leave home due to  Post surgery monitoring and Wound care needs.     Diet:   regular diet    Referrals/ Consults  Aide to provide assistance with personal care, ADLs, and vital signs.    Activities:   no lifting for 8 weeks, no driving for at least 2 weeks or until no longer requiring pain medication, no sex for 12 weeks, and no heavy lifting for 6 weeks    Nursing:   Agency to admit patient within 24 hours of hospital discharge unless specified on physician order or at patient request    SN to complete comprehensive assessment including routine vital signs. Instruct on disease process and s/s of complications to report to MD. Review/verify medication list sent home with the patient at time of discharge  and instruct patient/caregiver as needed. Frequency may be adjusted depending on start of care date.     Skilled nurse to perform up to 3 visits PRN for symptoms related to diagnosis    Notify MD if SBP > 160 or < 90; DBP > 90 or < 50; HR > 120 or < 50; Temp > 101; O2 < 88%; Other:       Ok to schedule additional visits based on staff availability and patient request on consecutive days within the home health episode.    When multiple disciplines ordered:    Start of Care occurs on Sunday - Wednesday schedule remaining discipline evaluations as ordered on separate consecutive days following the start of care.    Thursday SOC -schedule subsequent evaluations Friday and Monday the following week.     Friday - Saturday SOC - schedule subsequent discipline  evaluations on consecutive days starting Monday of the following week.      Home Health Aide:  Nursing Twice weekly    Wound Care Orders  yes:  Wound Vac:   Location:  abdomen           Dressing changes every Monday, Wednesday and Friday.        ET Consult    I certify that this patient is confined to her home and needs intermittent skilled nursing care.        SANDER Claudio, PA-C  OBGYN - Surgery  [facility]

## 2024-08-30 NOTE — PT/OT/SLP EVAL
Physical Therapy Evaluation and Treatment    Patient Name: Estefany Berumen   MRN: [id number]  Recent Surgery: Procedure(s) (LRB):  LAPAROSCOPY, DIAGNOSTIC (N/A)  EXCISION, SMALL INTESTINE (N/A)  APPLICATION, ACELLULAR HUMAN DERMAL ALLOGRAFT (N/A)  LAPAROTOMY, EXPLORATORY (N/A)  LAPAROTOMY, EXPLORATORY (N/A)  APPLICATION, WOUND VAC (N/A)  BLOCK, TRANSVERSUS ABDOMINIS PLANE (Bilateral) 4 Days Post-Op    Recommendations:     Discharge Recommendations: Low Intensity Therapy   Discharge Equipment Recommendations: none   Barriers to discharge: None    Assessment:     Estefany Berumen is a 48 y.o. female admitted with a medical diagnosis of S/P exploratory laparotomy. She presents with the following impairments/functional limitations: weakness, impaired endurance, impaired functional mobility, gait instability, impaired balance, decreased safety awareness, decreased lower extremity function.    Rehab Prognosis: Good; patient would benefit from acute PT services to address these deficits and reach maximum level of function.    Plan:     During this hospitalization, patient to be seen 3 x/week to address the above listed problems via gait training, therapeutic activities, therapeutic exercises    Plan of Care Expires: 09/13/24    Subjective     Chief Complaint: Pt is motivated to participate  Patient Comments/Goals: none stated  Pain/Comfort:  Pain Rating 1: 0/10    Social History:  Living Environment: Patient lives with their daughter and mother in a 2 story home with number of inside stair(s): 1 flight and bed/bath on 2nd floor  Prior Level of Function: Prior to admission, patient was independent, driving and working, and ambulated household and community distances using no AD  Equipment Used at Home: none  DME owned (not currently used): none  Assistance Upon Discharge: family    Objective:     Communicated with nurse and epic chart review prior to session. Patient found  ambulatory in room  with peripheral IV, wound vac  "upon PT entry to room.    General Precautions: Standard, fall   Orthopedic Precautions: N/A   Braces: N/A    Respiratory Status: Room air    Exams:  Cognition: Patient is oriented to Person, Place, Time, Situation  RLE ROM: WFL  RLE Strength:  WFL  LLE ROM: WFL  LLE Strength: WFL  Sensation:    -       Intact  Skin Integrity/Edema:     -       Skin integrity: Visible skin intact    Functional Mobility:  Gait belt applied - Yes  Bed Mobility  Ambulatory in room, returned to EOB  Educated on log rolling technique  Transfers  Sit to Stand: stand by assistance with no AD  Gait  Patient ambulated 100ft pushing IV pole and stand by assistance. Patient demonstrates occasional unsteady gait. No c/o dizziness or SOB, no gross LOB. All lines remained intact throughout ambulation trail.  Balance  Sitting: supervision  Standing: stand by assistance    Therapeutic Activities and Exercises:   Pt educated on role of PT in acute care and POC. Educated on importance of OOB activities, activity pacing, and HEP (marching/hip flex, hip abd, heel slides/LAQ, quad sets, ankle pumps) in order to maintain/regain strength. Encouraged to sit up in chair for all meals. Educated on "call don't fall" policy and increased risk of falling due to weakness, instructed to utilize call bell for assistance with all transfers. Pt agreeable to all requests.    AM-PAC 6 CLICK MOBILITY  Total Score:12    Patient left sitting edge of bed with all lines intact, call button in reach, and family present.    GOALS:   Multidisciplinary Problems       Physical Therapy Goals          Problem: Physical Therapy    Goal Priority Disciplines Outcome Goal Variances Interventions   Physical Therapy Goal     PT, PT/OT      Description: Goals to be met by 9/13/24.  1. Pt will complete bed mobility MOD I.  2. Pt will complete sit to stand MOD I.  3. Pt will ambulate 200ft MOD I.  4. Pt will increase AMPAC score by 2 points to progress functional mobility.                 "       History:     Past Medical History:   Diagnosis Date    Hypertension     Myocardial infarction     Uterine prolapse 01/05/2024       Past Surgical History:   Procedure Laterality Date    abdominalplasty      APPLICATION OF WOUND VACUUM-ASSISTED CLOSURE DEVICE N/A 8/26/2024    Procedure: APPLICATION, WOUND VAC;  Surgeon: Theresa Morrow MD;  Location: [facility] OR;  Service: Colon and Rectal;  Laterality: N/A;    DIAGNOSTIC LAPAROSCOPY N/A 8/26/2024    Procedure: LAPAROSCOPY, DIAGNOSTIC;  Surgeon: Julia Lemus MD;  Location: [facility] OR;  Service: OB/GYN;  Laterality: N/A;    EXCISION, SMALL INTESTINE N/A 8/26/2024    Procedure: EXCISION, SMALL INTESTINE;  Surgeon: Theresa Morrow MD;  Location: [facility] OR;  Service: Colon and Rectal;  Laterality: N/A;    INJECTION OF ANESTHETIC AGENT INTO TISSUE PLANE DEFINED BY TRANSVERSUS ABDOMINIS MUSCLE Bilateral 8/26/2024    Procedure: BLOCK, TRANSVERSUS ABDOMINIS PLANE;  Surgeon: Theresa Morrow MD;  Location: [facility] OR;  Service: Colon and Rectal;  Laterality: Bilateral;    LAPAROTOMY, EXPLORATORY N/A 8/26/2024    Procedure: LAPAROTOMY, EXPLORATORY;  Surgeon: Julia Lemus MD;  Location: [facility] OR;  Service: OB/GYN;  Laterality: N/A;    LAPAROTOMY, EXPLORATORY N/A 8/26/2024    Procedure: LAPAROTOMY, EXPLORATORY;  Surgeon: Theresa Morrow MD;  Location: [facility] OR;  Service: Colon and Rectal;  Laterality: N/A;    LIPOSUCTION  05/28/2024    back    PLACEMENT OF ACELLULAR HUMAN DERMAL ALLOGRAFT N/A 8/26/2024    Procedure: APPLICATION, ACELLULAR HUMAN DERMAL ALLOGRAFT;  Surgeon: Theresa Morrow MD;  Location: [facility] OR;  Service: Colon and Rectal;  Laterality: N/A;    REDUCTION OF BOTH BREASTS Bilateral 2015    REMOVAL OF INTRAUTERINE DEVICE (IUD) N/A 8/22/2024    Procedure: REMOVAL, INTRAUTERINE DEVICE;  Surgeon: CAT Dwyer MD;  Location: [facility] OR;  Service: OB/GYN;  Laterality: N/A;    TOTAL REDUCTION MAMMOPLASTY Bilateral 2015    tummy tuck   04/24/2024    XI ROBOTIC HYSTERECTOMY, WITH SALPINGO-OOPHORECTOMY Bilateral 8/22/2024    Procedure: XI ROBOTIC HYSTERECTOMY,WITH SALPINGO-OOPHORECTOMY;  Surgeon: CAT Dwyer MD;  Location: [facility];  Service: OB/GYN;  Laterality: Bilateral;       Time Tracking:     PT Received On: 08/30/24  PT Start Time: 1055  PT Stop Time: 1110  PT Total Time (min): 15 min     Billable Minutes: Evaluation 15min    8/30/2024

## 2024-09-05 ENCOUNTER — NURSE TRIAGE (OUTPATIENT)
Dept: ADMINISTRATIVE | Facility: CLINIC | Age: 49
End: 2024-09-05
Payer: COMMERCIAL

## 2024-09-06 ENCOUNTER — TELEPHONE (OUTPATIENT)
Dept: OBSTETRICS AND GYNECOLOGY | Facility: CLINIC | Age: 49
End: 2024-09-06
Payer: COMMERCIAL

## 2024-09-06 NOTE — TELEPHONE ENCOUNTER
Pt Sister called stating that pt is having complications from sx. Sister states pt had to have a second sx with Dr Lemus due to bowel perforation, and that pt also has greenish pus pockets in pelvic area. Pt went to [facility] and was admitted. Currently is at home with home health states having a wound vac and a yony drain. She is c/o abdominal pain.  Sister states she called Home Health and they stated that they are short staffed and does not have anyone that can come out and assess pt drain at this time. Message sent to provider    Felicita BANEGAS LPN  OB/GYN

## 2024-09-06 NOTE — TELEPHONE ENCOUNTER
Spoke with Estefany, patient's sister. States that the patient has a wound vac that has not had any drainage for a few days. Advised to contact the patient's home health nurse. Patient verbalizes understanding. Advised the patient to call back with any further questions or if symptoms worsen.        Reason for Disposition   General information question, no triage required and triager able to answer question    Protocols used: Information Only Call - No Triage-A-

## 2024-09-06 NOTE — TELEPHONE ENCOUNTER
Spoke with pt sister, she stated that an ED visit is not needed at this time. Sister states that [facility] will come out an see pt. Sister is requesting that pt follow up with Dr. Mejia. Pt scheduled for 9/13     Felicita BANEGAS LPN  OB/GYN

## 2024-09-06 NOTE — TELEPHONE ENCOUNTER
----- Message from CAT Siu MD sent at 9/6/2024  1:50 PM CDT -----  Contact: Jerri/Sister  She can return to the [facility] ED where we can evaluate and treat.   She refused transfer to our ED earlier in the week   WE are happy to see her, but this is above the office at this time  ----- Message -----  From: Madhavi Mejia LPN  Sent: 9/6/2024  12:02 PM CDT  To: CAT Siu MD    Good Afternoon,     Pt Sister called stating that pt is having complications from sx. Sister states pt had to have a second sx with Dr Lemus due to bowel perforation, and that pt also had greenish pus pockets in pelvic area. Pt went to [facility] and was admitted. Currently is at home with home health states having a wound vac and a yony drain. She is c/o abdominal pain.  Sister states she called Home Health and they stated that they are short staffed and does not have anyone that can come out and assess pt at this time.     Please Advise   Felicita  ----- Message -----  From: Cathy Callaway  Sent: 9/6/2024  11:08 AM CDT  To: Yuliya Candelaria Staff    .Type:  Needs Medical Advice    Who Called: Pts sister/ Jerri evans   Symptoms (please be specific): Pt sister states pt is having complications from surgery. Had a procedure with Dr siu that required a second surgery with Dr Lemus. States pt previously having greenish pus out of vaginal [place] Currently is at home with home health states having a wound vac and a yony drain. Pt has abdominal pain.       How long has patient had these symptoms:   Since surgery   Pharmacy name and phone #:   .  [id number] [place] - [address]  [address]  [place] [address]  Phone: [phone] Fax: [phone]  Would the patient rather a call back or a response via [facility]?  Call   Best Call Back Number:  [phone] - sister   Additional Information:

## 2024-09-06 NOTE — TELEPHONE ENCOUNTER
----- Message from Cathy Washington sent at 9/6/2024 11:04 AM CDT -----  Contact: Jerri/Sister  .Type:  Needs Medical Advice    Who Called: Pts sister/ Jerri evans   Symptoms (please be specific): Pt sister states pt is having complications from surgery. Had a procedure with Dr siu that required a second surgery with Dr Lemus. States pt previously having greenish pus out of vaginal [place] Currently is at home with home health states having a wound vac and a yony drain. Pt has abdominal pain.       How long has patient had these symptoms:   Since surgery   Pharmacy name and phone #:   .  [id number] [place]  [address]  [address] [place] [address]  Phone: [phone] Fax: [phone]  Would the patient rather a call back or a response via [facility]?  Call   Best Call Back Number:  [phone] -    Additional Information:

## 2024-09-10 ENCOUNTER — TELEPHONE (OUTPATIENT)
Dept: OBSTETRICS AND GYNECOLOGY | Facility: CLINIC | Age: 49
End: 2024-09-10
Payer: COMMERCIAL

## 2024-09-10 NOTE — TELEPHONE ENCOUNTER
----- Message from Tigist Mejia MD sent at 9/10/2024  2:06 PM CDT -----  Contact: Estefany  Sure, what do we need to do; but fyi--dr delgado did her surgery  ----- Message -----  From: Madhavi Mejia LPN  Sent: 9/10/2024   1:38 PM CDT  To: Tigist Mejia MD    Good Afternoon,      Pt called in regards to wanting to switch from her current home health service. to [facility] if possible.    Please Advise   Felicita  ----- Message -----  From: Nilsa Posey  Sent: 9/10/2024  12:15 PM CDT  To: Roberto Allison would like a call back at [phone] in regards to wanting to switch from her current home health service. Patient is wanting to be switched over to [facility] if possible.  Thanks   Am

## 2024-09-10 NOTE — TELEPHONE ENCOUNTER
Attempted to rtn call to pt in regards to information regarding changing Home health Services.  Pt wanted to coming today to see Dr. Mejia. Advised pt that  is not in clinic on today. pt has appt scheduled with Dr. Mejia on 9/13  as her sister only wanted to see Dr. Mejia.  she insisted she did not want to follow up with Dr. Dwyer. Called pt back to see If she wanted to follow up with [place] if clinic is open on 9/12, KEVEN BANEGAS LPN  OB/GYN

## 2024-09-10 NOTE — TELEPHONE ENCOUNTER
Estefany would like a call back at [phone] in regards to wanting to switch from her current home health service. Patient is wanting to be switched over to [facility] if possible... Message sent to provider     Felicita BANEGAS LPN  OB/GYN

## 2024-09-10 NOTE — TELEPHONE ENCOUNTER
----- Message from Nilsa Posey sent at 9/10/2024 12:13 PM CDT -----  Contact: Estefany Allison would like a call back at [phone] in regards to wanting to switch from her current home health service. Patient is wanting to be switched over to [facility] if possible.  Thanks   Am

## 2024-09-12 ENCOUNTER — TELEPHONE (OUTPATIENT)
Dept: OBSTETRICS AND GYNECOLOGY | Facility: CLINIC | Age: 49
End: 2024-09-12
Payer: COMMERCIAL

## 2024-09-12 NOTE — TELEPHONE ENCOUNTER
Called Lexa and spoke Mi.  She will let therapy know that LL out and message has been sent to her.

## 2024-09-12 NOTE — TELEPHONE ENCOUNTER
Called [employer] and advised LL out this afternoon and message would be sent and provider will handle at earliest convenience.

## 2024-09-12 NOTE — TELEPHONE ENCOUNTER
----- Message from Tk Boss sent at 9/12/2024  2:01 PM CDT -----  Contact: [phone]  Type:  Needs Medical Advice    Who Called:[facility]  Symptoms (please be specific): requesting  home health orders ([id number])  Would the patient rather a call back or a response via [facility]? Call back  Best Call Back Number: [phone]  Additional Information:

## 2024-09-12 NOTE — TELEPHONE ENCOUNTER
----- Message from Yissel Ojeda sent at 9/12/2024 10:54 AM CDT -----  Type:  Referral     Who Called:Mayra with      Does the patient know what this is regarding?:Referral Fore    Would the patient rather a call back or a response via [facility]? Call   Best Call Back Number:   Additional Information:      Mayra stated they need a referral sent to [facility] Fax# [phone] or  Sissy with [facility] # [phone]

## 2024-09-13 ENCOUNTER — OFFICE VISIT (OUTPATIENT)
Dept: OBSTETRICS AND GYNECOLOGY | Facility: CLINIC | Age: 49
End: 2024-09-13
Payer: COMMERCIAL

## 2024-09-13 VITALS — HEIGHT: 65 IN | BODY MASS INDEX: 39.03 KG/M2 | SYSTOLIC BLOOD PRESSURE: 144 MMHG | DIASTOLIC BLOOD PRESSURE: 80 MMHG

## 2024-09-13 DIAGNOSIS — Z98.890 S/P EXPLORATORY LAPAROTOMY: Primary | ICD-10-CM

## 2024-09-13 DIAGNOSIS — Z90.710 STATUS POST LAPAROSCOPIC HYSTERECTOMY: ICD-10-CM

## 2024-09-13 PROCEDURE — 99999 PR PBB SHADOW E&M-EST. PATIENT-LVL III: CPT | Mod: PBBFAC,,, | Performed by: OBSTETRICS & GYNECOLOGY

## 2024-09-13 NOTE — PROGRESS NOTES
Subjective:       Patient ID: Estefany Berumen is a 48 y.o. female.    Chief Complaint:  Follow-up      History of Present Illness  Follow-up      Postoperative Follow-up  Patient presents to the clinic 3 weeks status post  ralh for uteruine prolapse and exp lap for bowel perforation; has wound vac in place  for pelvic relaxation.  Most recently seen in [facility] 9/3/24  and had IR drainage of pelvic abscess.  Has finished 7 day course of augmentin.  Denies fever chills; n/v/d;  Reports occas bladder spasm but tolerable  Also reports minimal vaginal spotting;    Eating a regular diet without difficulty. Bowel movements are normal. Pain is controlled with current analgesics. Medications being used: narcotic analgesics including oxycodone--reports has to especially use at time of dressing removals.        GYN & OB History  Patient's last menstrual period was 2024.   Date of Last Pap: 2022    OB History    Para Term  AB Living   6 1 1   5 1   SAB IAB Ectopic Multiple Live Births   5       1      # Outcome Date GA Lbr Dev/2nd Weight Sex Type Anes PTL Lv   6 SAB            5 SAB            4 SAB            3 SAB            2 SAB            1 Term      Vag-Spont   GERMAN       Review of Systems  Review of Systems   Genitourinary:  Positive for urgency.   All other systems reviewed and are negative.          Objective:      Physical Exam:   Constitutional: She is oriented to person, place, and time. She appears well-developed.     Eyes: Pupils are equal, round, and reactive to light. Conjunctivae and EOM are normal.      Pulmonary/Chest: Effort normal. Right breast exhibits no mass, no nipple discharge, no skin change, no tenderness and presence. Left breast exhibits no mass, no nipple discharge, no skin change, no tenderness and presence. Breasts are symmetrical.        Abdominal: Soft. Bowel sounds are normal. She exhibits abdominal incision (wound vac in place).     Genitourinary:    Inguinal canal,  vagina and rectum normal.      Pelvic exam was performed with patient supine.     Labial bartholins normal.          Musculoskeletal: Normal range of motion and moves all extremeties.       Neurological: She is alert and oriented to person, place, and time.    Skin: Skin is warm.    Psychiatric: She has a normal mood and affect.           Assessment:        1. S/P exploratory laparotomy; small bowel resection    2. Status post laparoscopic hysterectomy               Plan:      Continue pelvic rest x 12 wks  Has home health following for wound vac  Pt to call if needs medication for bladder spasm  Post op check 6 wks

## 2024-09-16 ENCOUNTER — TELEPHONE (OUTPATIENT)
Dept: SURGERY | Facility: CLINIC | Age: 49
End: 2024-09-16
Payer: COMMERCIAL

## 2024-09-16 NOTE — TELEPHONE ENCOUNTER
Called and spoke with patient regarding wound vac supplies. Informed patient she will need to get her supply company to fax over supply order form so Dr. Morrow can sign off on it. Patient verbalized understanding.    ----- Message from Franc Patel sent at 9/16/2024  8:37 AM CDT -----  Contact: Estefany  .Type:  Needs Medical Advice    Who Called:  Estefany     Would the patient rather a call back or a response via [facility]?  Call back   Best Call Back Number:  . [phone] if no answer please respond in pt portal     Additional Information:  Pt is calling in regard to the orders for the wound vac and would like to get a call back due to out of supplies and states new orders are needed.  Pt would also like to discuss getting scheduled for a follow up appt     Thanks

## 2024-09-17 ENCOUNTER — PATIENT MESSAGE (OUTPATIENT)
Dept: OBSTETRICS AND GYNECOLOGY | Facility: CLINIC | Age: 49
End: 2024-09-17
Payer: COMMERCIAL

## 2024-09-17 DIAGNOSIS — N95.1 MENOPAUSAL SYMPTOMS: Primary | ICD-10-CM

## 2024-09-19 ENCOUNTER — OFFICE VISIT (OUTPATIENT)
Dept: SURGERY | Facility: CLINIC | Age: 49
End: 2024-09-19
Payer: COMMERCIAL

## 2024-09-19 VITALS
HEART RATE: 85 BPM | OXYGEN SATURATION: 98 % | BODY MASS INDEX: 34.7 KG/M2 | TEMPERATURE: 99 F | WEIGHT: 208.25 LBS | SYSTOLIC BLOOD PRESSURE: 138 MMHG | HEIGHT: 65 IN | DIASTOLIC BLOOD PRESSURE: 83 MMHG

## 2024-09-19 DIAGNOSIS — Z98.890 S/P EXPLORATORY LAPAROTOMY: Primary | ICD-10-CM

## 2024-09-19 PROCEDURE — 99024 POSTOP FOLLOW-UP VISIT: CPT | Mod: S$GLB,,, | Performed by: STUDENT IN AN ORGANIZED HEALTH CARE EDUCATION/TRAINING PROGRAM

## 2024-09-19 PROCEDURE — 99999 PR PBB SHADOW E&M-EST. PATIENT-LVL III: CPT | Mod: PBBFAC,,, | Performed by: STUDENT IN AN ORGANIZED HEALTH CARE EDUCATION/TRAINING PROGRAM

## 2024-09-19 PROCEDURE — 3075F SYST BP GE 130 - 139MM HG: CPT | Mod: CPTII,S$GLB,, | Performed by: STUDENT IN AN ORGANIZED HEALTH CARE EDUCATION/TRAINING PROGRAM

## 2024-09-19 PROCEDURE — 1159F MED LIST DOCD IN RCRD: CPT | Mod: CPTII,S$GLB,, | Performed by: STUDENT IN AN ORGANIZED HEALTH CARE EDUCATION/TRAINING PROGRAM

## 2024-09-19 PROCEDURE — 3079F DIAST BP 80-89 MM HG: CPT | Mod: CPTII,S$GLB,, | Performed by: STUDENT IN AN ORGANIZED HEALTH CARE EDUCATION/TRAINING PROGRAM

## 2024-09-20 RX ORDER — ESTRADIOL 1 MG/1
1 TABLET ORAL DAILY
Qty: 90 TABLET | Refills: 3 | Status: SHIPPED | OUTPATIENT
Start: 2024-09-20 | End: 2025-09-20

## 2024-09-20 NOTE — PROGRESS NOTES
CRS Office Visit    SUBJECTIVE:     Chief Complaint: wound check    History of Present Illness:  Patient is a 48 y.o. female presents as a follow up of diagnostic laparoscopy converted to exploratory laparotomy for small bowel perforation.  She underwent small-bowel resection with anastomosis.  Postoperative course was complicated by abdominal abscess formation for which she presented to [facility] and underwent percutaneous drainage.  Her drainage since been removed.  She still has her midline wound VAC in place.  She does not have supplies with her today to replace.  But reports that her wound is clean and improving with the VAC.  She endorses regular appetite and is having normal bowel function.  Denies nausea vomiting or constipation.  She does have fatigue with decreased stamina      Review of patient's allergies indicates:   Allergen Reactions    Sulfa (sulfonamide antibiotics) Other (See Comments)    Sulfamethoxazole-trimethoprim     Trimethoprim Other (See Comments)    Dilaudid [hydromorphone] Anxiety       Past Medical History:   Diagnosis Date    Hypertension     Myocardial infarction     Uterine prolapse 01/05/2024     Past Surgical History:   Procedure Laterality Date    abdominalplasty      APPLICATION OF WOUND VACUUM-ASSISTED CLOSURE DEVICE N/A 8/26/2024    Procedure: APPLICATION, WOUND VAC;  Surgeon: Theresa Morrow MD;  Location: [facility] OR;  Service: Colon and Rectal;  Laterality: N/A;    DIAGNOSTIC LAPAROSCOPY N/A 8/26/2024    Procedure: LAPAROSCOPY, DIAGNOSTIC;  Surgeon: Julia Lemus MD;  Location: [facility] OR;  Service: OB/GYN;  Laterality: N/A;    EXCISION, SMALL INTESTINE N/A 8/26/2024    Procedure: EXCISION, SMALL INTESTINE;  Surgeon: Theresa Morrow MD;  Location: [facility] OR;  Service: Colon and Rectal;  Laterality: N/A;    INJECTION OF ANESTHETIC AGENT INTO TISSUE PLANE DEFINED BY TRANSVERSUS ABDOMINIS MUSCLE Bilateral 8/26/2024    Procedure: BLOCK, TRANSVERSUS ABDOMINIS PLANE;  Surgeon:  Theresa Morrow MD;  Location: [facility] OR;  Service: Colon and Rectal;  Laterality: Bilateral;    LAPAROTOMY, EXPLORATORY N/A 8/26/2024    Procedure: LAPAROTOMY, EXPLORATORY;  Surgeon: Julia Lemus MD;  Location: [facility] OR;  Service: OB/GYN;  Laterality: N/A;    LAPAROTOMY, EXPLORATORY N/A 8/26/2024    Procedure: LAPAROTOMY, EXPLORATORY;  Surgeon: Theresa Morrow MD;  Location: [facility] OR;  Service: Colon and Rectal;  Laterality: N/A;    LIPOSUCTION  05/28/2024    back    PLACEMENT OF ACELLULAR HUMAN DERMAL ALLOGRAFT N/A 8/26/2024    Procedure: APPLICATION, ACELLULAR HUMAN DERMAL ALLOGRAFT;  Surgeon: Theresa Morrow MD;  Location: [facility] OR;  Service: Colon and Rectal;  Laterality: N/A;    REDUCTION OF BOTH BREASTS Bilateral 2015    REMOVAL OF INTRAUTERINE DEVICE (IUD) N/A 8/22/2024    Procedure: REMOVAL, INTRAUTERINE DEVICE;  Surgeon: CAT Dwyer MD;  Location: [facility] OR;  Service: OB/GYN;  Laterality: N/A;    TOTAL REDUCTION MAMMOPLASTY Bilateral 2015    tummy tuck  04/24/2024    XI ROBOTIC HYSTERECTOMY, WITH SALPINGO-OOPHORECTOMY Bilateral 8/22/2024    Procedure: XI ROBOTIC HYSTERECTOMY,WITH SALPINGO-OOPHORECTOMY;  Surgeon: CAT Dwyer MD;  Location: [facility] OR;  Service: OB/GYN;  Laterality: Bilateral;     Family History   Problem Relation Name Age of Onset    Hypertension Sister      Hypertension Brother       Social History     Tobacco Use    Smoking status: Never    Smokeless tobacco: Never   Substance Use Topics    Alcohol use: Not Currently    Drug use: Never          OBJECTIVE:     Vital Signs (Most Recent)  Temp: 99 °F (37.2 °C) (09/19/24 1312)  Pulse: 85 (09/19/24 1312)  BP: 138/83 (09/19/24 1312)  SpO2: 98 % (09/19/24 1312)    Physical Exam:  Physical Exam  Constitutional:       Appearance: She is well-developed.   HENT:      Head: Normocephalic and atraumatic.   Eyes:      Conjunctiva/sclera: Conjunctivae normal.      Pupils: Pupils are equal, round, and reactive to light.    Neck:      Thyroid: No thyromegaly.   Cardiovascular:      Rate and Rhythm: Normal rate and regular rhythm.   Pulmonary:      Effort: Pulmonary effort is normal. No respiratory distress.   Abdominal:      General: There is no distension.      Palpations: Abdomen is soft. There is no mass.      Tenderness: There is no abdominal tenderness.      Comments: Midline wound VAC in place with good seal.  Abdomen soft, nondistended and nontender   Musculoskeletal:         General: No tenderness. Normal range of motion.      Cervical back: Normal range of motion.   Skin:     General: Skin is warm and dry.      Capillary Refill: Capillary refill takes less than 2 seconds.   Neurological:      General: No focal deficit present.      Mental Status: She is alert and oriented to person, place, and time.         ASSESSMENT/PLAN:     Estefany was seen today for post-op evaluation.    Diagnoses and all orders for this visit:    S/P exploratory laparotomy; small bowel resection    - patient to send MyChart picture of wound at next change with home health  - okay to transition to wet-to-dry dressing and DC VAC therapy when deemed appropriate by home health  - RTC 4 weeks

## 2024-09-24 ENCOUNTER — PATIENT MESSAGE (OUTPATIENT)
Dept: SURGERY | Facility: CLINIC | Age: 49
End: 2024-09-24
Payer: COMMERCIAL

## 2024-10-04 ENCOUNTER — PATIENT MESSAGE (OUTPATIENT)
Dept: SURGICAL ONCOLOGY | Facility: CLINIC | Age: 49
End: 2024-10-04
Payer: COMMERCIAL

## 2024-10-04 ENCOUNTER — TELEPHONE (OUTPATIENT)
Dept: SURGICAL ONCOLOGY | Facility: CLINIC | Age: 49
End: 2024-10-04
Payer: COMMERCIAL

## 2024-10-04 NOTE — TELEPHONE ENCOUNTER
----- Message from Elliott sent at 10/4/2024 10:36 AM CDT -----  Contact: pt  Type: Requesting to speak with nurse        Who Called: PT  Regarding: extension needed for FMLA paperwork due to patient getting wound care  Would the patient rather a call back or a response via [facility]? Call back  Best Call Back Number: [phone]   Additional Information:

## 2024-10-04 NOTE — TELEPHONE ENCOUNTER
Called Pt per [employer] message regarding FMLA paperwork. Pt stated FMLA vendor advised to have her provider complete an extension form from the original FMLA paperwork to cover more days required for care/recovery. Pt stated Dr. Candelaria completed and sign her original paper work.. Advise Pt Dr. Candelaria will have to complete and sign the extension form from the original paperwork not Dr. Morrow, however she can send over a new FMLA form and Dr. Morrow will complete and sign the new form for the required time off to cover her additional days. Provided PT fax number to fax over a New FMLA Form for Dr. Morrow to complete and sign.   Spoke to patient. We will see him at 9:00 on 8-17-23.

## 2024-10-08 ENCOUNTER — PATIENT MESSAGE (OUTPATIENT)
Dept: SURGERY | Facility: HOSPITAL | Age: 49
End: 2024-10-08
Payer: COMMERCIAL

## 2024-10-15 ENCOUNTER — PATIENT MESSAGE (OUTPATIENT)
Dept: SURGERY | Facility: HOSPITAL | Age: 49
End: 2024-10-15
Payer: COMMERCIAL

## 2024-10-25 ENCOUNTER — OFFICE VISIT (OUTPATIENT)
Dept: OBSTETRICS AND GYNECOLOGY | Facility: CLINIC | Age: 49
End: 2024-10-25
Payer: COMMERCIAL

## 2024-10-25 ENCOUNTER — HOSPITAL ENCOUNTER (OUTPATIENT)
Dept: RADIOLOGY | Facility: HOSPITAL | Age: 49
Discharge: HOME | End: 2024-10-25
Attending: OBSTETRICS & GYNECOLOGY
Payer: COMMERCIAL

## 2024-10-25 VITALS — WEIGHT: 207.25 LBS | BODY MASS INDEX: 34.53 KG/M2 | HEIGHT: 65 IN

## 2024-10-25 VITALS
DIASTOLIC BLOOD PRESSURE: 70 MMHG | HEIGHT: 65 IN | SYSTOLIC BLOOD PRESSURE: 122 MMHG | WEIGHT: 207.25 LBS | BODY MASS INDEX: 34.53 KG/M2

## 2024-10-25 DIAGNOSIS — Z90.710 STATUS POST LAPAROSCOPIC HYSTERECTOMY: ICD-10-CM

## 2024-10-25 DIAGNOSIS — Z12.31 SCREENING MAMMOGRAM, ENCOUNTER FOR: Primary | ICD-10-CM

## 2024-10-25 DIAGNOSIS — Z12.31 SCREENING MAMMOGRAM, ENCOUNTER FOR: ICD-10-CM

## 2024-10-25 DIAGNOSIS — Z98.890 S/P EXPLORATORY LAPAROTOMY: ICD-10-CM

## 2024-10-25 DIAGNOSIS — N89.8 VAGINAL ODOR: ICD-10-CM

## 2024-10-25 PROCEDURE — 77063 BREAST TOMOSYNTHESIS BI: CPT | Mod: 26,,, | Performed by: RADIOLOGY

## 2024-10-25 PROCEDURE — 99999 PR PBB SHADOW E&M-EST. PATIENT-LVL III: CPT | Mod: PBBFAC,,, | Performed by: OBSTETRICS & GYNECOLOGY

## 2024-10-25 PROCEDURE — 77067 SCR MAMMO BI INCL CAD: CPT | Mod: TC

## 2024-10-25 PROCEDURE — 77067 SCR MAMMO BI INCL CAD: CPT | Mod: 26,,, | Performed by: RADIOLOGY

## 2024-10-25 RX ORDER — METRONIDAZOLE 500 MG/1
500 TABLET ORAL EVERY 12 HOURS
Qty: 14 TABLET | Refills: 0 | Status: SHIPPED | OUTPATIENT
Start: 2024-10-25 | End: 2024-11-01

## 2024-10-25 NOTE — PROGRESS NOTES
Subjective:       Patient ID: Estefany Berumen is a 48 y.o. female.    Chief Complaint:  Post-op Evaluation      History of Present Illness  HPI  Annual Exam-Postmenopausal  Patient presents for annual exam. The patient has no complaints today--also post op check--reports minimal packing--only at belly button; .c/o vaginal odor;  The patient is not currently sexually active. (Post op)GYN screening history: last pap: approximate date 2022 and was normal and last mammogram: approximate date 10/2023 and was normal. The patient is taking hormone replacement therapy. Patient denies post-menopausal vaginal bleeding. The patient wears seatbelts: yes. The patient participates in regular exercise: no. Has the patient ever been transfused or tattooed?: yes. The patient reports that there is not domestic violence in her life.  Denies   GYN & OB History  Patient's last menstrual period was 2024.   Date of Last Pap: 2022    OB History    Para Term  AB Living   6 1 1   5 1   SAB IAB Ectopic Multiple Live Births   5       1      # Outcome Date GA Lbr Dev/2nd Weight Sex Type Anes PTL Lv   6 SAB            5 SAB            4 SAB            3 SAB            2 SAB            1 Term      Vag-Spont   GERMAN       Review of Systems  Review of Systems   Genitourinary:  Positive for vaginal odor.   All other systems reviewed and are negative.          Objective:      Physical Exam:   Constitutional: She is oriented to person, place, and time. She appears well-developed and well-nourished.     Eyes: Pupils are equal, round, and reactive to light. Conjunctivae and EOM are normal.      Pulmonary/Chest: Effort normal. Right breast exhibits no mass, no nipple discharge, no skin change and no tenderness. Left breast exhibits no mass, no nipple discharge, no skin change and no tenderness. Breasts are symmetrical.        Abdominal: Soft. She exhibits abdominal incision (healing well; +keloid scar-vertical; packing in  umbilicus).     Genitourinary:    Inguinal canal, urethra, bladder, vagina, right adnexa, left adnexa and rectum normal.      Pelvic exam was performed with patient supine.   The external female genitalia was normal.     Labial bartholins normal.Right adnexum displays no mass and no tenderness. Left adnexum displays no mass and no tenderness. No erythema, vaginal discharge, bleeding, rectocele, cystocele or prolapse of vaginal walls in the vagina. Cervix is absent.Uterus is absent. Normal urethral meatus.Urethra findings: no urethral massBladder findings: no bladder distention and no bladder tenderness          Musculoskeletal: Normal range of motion and moves all extremeties.       Neurological: She is alert and oriented to person, place, and time.    Skin: Skin is warm.    Psychiatric: She has a normal mood and affect. Her behavior is normal.           Assessment:        1. Screening mammogram, encounter for    2. Vaginal odor    3. S/P exploratory laparotomy    4. Status post laparoscopic hysterectomy               Plan:      Continue annual well woman exam.   Pap not indicated due to hx of nml pap and hx of sb  R[place]kin ok in 3 more weeks (s/p ralh); use lubricant if needed  Continue ERT  Accepts referral to derm (keloid scar)  Continue diet, exercise, weight loss  Colonosocpy current, will get op note/path report for review  Flagyl for vaginal odor

## (undated) DEVICE — DRAPE LAVH SURG 109X109X100IN

## (undated) DEVICE — TIP GRASPER FENESTRATED DISP

## (undated) DEVICE — TUBING MEDI-VAC 20FT .25IN

## (undated) DEVICE — TROCAR ENDOPATH XCEL 5X100MM

## (undated) DEVICE — SEALER LIGASURE IMPACT 18CM

## (undated) DEVICE — MANIFOLD 4 PORT

## (undated) DEVICE — OCCLUDER COLPO-PNEUMO STERILE

## (undated) DEVICE — DRAPE COLUMN DAVINCI XI

## (undated) DEVICE — TROCAR ENDOPATH XCEL 11MM 10CM

## (undated) DEVICE — SOL IRRI STRL WATER 1000ML

## (undated) DEVICE — SOL NORMAL USPCA 0.9%

## (undated) DEVICE — COVER TIP CURVED SCISSORS XI

## (undated) DEVICE — SUT V-LOC 90 GS22 2-0 VIO 23CM

## (undated) DEVICE — DRAPE ARM DAVINCI XI

## (undated) DEVICE — COVER PROXIMA MAYO STAND

## (undated) DEVICE — SOL NACL IRR 1000ML BTL

## (undated) DEVICE — NDL PNEUMO INSUFFLATI 120MM

## (undated) DEVICE — SYR 50CC LL

## (undated) DEVICE — KIT ANTIFOG W/SPONG & FLUID

## (undated) DEVICE — IRRIGATOR ENDOSCOPY DISP.

## (undated) DEVICE — SUT MONOCRYL 4-0 PS-1 UND

## (undated) DEVICE — SUT PDSII DA VIOL1X18 V-38

## (undated) DEVICE — SUT 0 60IN PDS II VIO MONO

## (undated) DEVICE — SYR LUER-LOCK STERILE 3ML

## (undated) DEVICE — TRAY SKIN SCRUB WET 4 COMPART

## (undated) DEVICE — ADHESIVE DERMABOND ADVANCED

## (undated) DEVICE — DRAPE STERI LONG

## (undated) DEVICE — PACK BASIC SETUP SC BR

## (undated) DEVICE — KIT DRSNG GRNUFM MED 18X12X5CM

## (undated) DEVICE — ELECTRODE REM PLYHSV RETURN 9

## (undated) DEVICE — TRAY CATH 1-LYR URIMTR 16FR

## (undated) DEVICE — SOL ELECTROLUBE ANTI-STIC

## (undated) DEVICE — SUT MONOCRYL 4.0 PS2 CP496G

## (undated) DEVICE — APPLICATOR CHLORAPREP ORN 26ML

## (undated) DEVICE — SUT VICRYL PLUS 0 CT1 36IN

## (undated) DEVICE — SYR LUER LOCK STERILE 10ML

## (undated) DEVICE — TIP RUMI BLUE DISPOSABLE 5/BX

## (undated) DEVICE — KIT AIRSEAL CANN CAP STD 8MM

## (undated) DEVICE — TOWEL OR DISP STRL BLUE 4/PK

## (undated) DEVICE — SUT VICRYL PLUS 3-0 SH 18IN

## (undated) DEVICE — HEADREST ROUND DISP FOAM 9IN

## (undated) DEVICE — COVER LIGHT HANDLE 80/CA

## (undated) DEVICE — RELOAD PROXIMATE CUT BLUE 75MM

## (undated) DEVICE — KIT AIRSEAL BIFURCT FLTR TB

## (undated) DEVICE — SEAL CANN UNIVERSAL 5-12MM

## (undated) DEVICE — OBTURATOR BLADELESS 8MM XI CLR

## (undated) DEVICE — GOWN POLY REINF BRTH SLV XL

## (undated) DEVICE — SET TUBE DAVINCI 5 HI FLOW INS

## (undated) DEVICE — DRAPE UINDERBUT GRAD PCH

## (undated) DEVICE — CORD LAP 10 DISP

## (undated) DEVICE — CUTTER PROXIMATE BLUE 75MM